# Patient Record
Sex: MALE | Race: WHITE | NOT HISPANIC OR LATINO | Employment: OTHER | URBAN - METROPOLITAN AREA
[De-identification: names, ages, dates, MRNs, and addresses within clinical notes are randomized per-mention and may not be internally consistent; named-entity substitution may affect disease eponyms.]

---

## 2019-01-01 ENCOUNTER — HOSPITAL ENCOUNTER (INPATIENT)
Facility: HOSPITAL | Age: 54
LOS: 4 days | Discharge: HOME | End: 2019-12-22
Attending: HOSPITALIST | Admitting: HOSPITALIST

## 2019-01-01 ENCOUNTER — APPOINTMENT (OUTPATIENT)
Dept: MRI IMAGING | Facility: HOSPITAL | Age: 54
End: 2019-01-01

## 2019-01-01 ENCOUNTER — APPOINTMENT (OUTPATIENT)
Dept: CT IMAGING | Facility: HOSPITAL | Age: 54
End: 2019-01-01

## 2019-01-01 ENCOUNTER — HOSPITAL ENCOUNTER (EMERGENCY)
Facility: HOSPITAL | Age: 54
Discharge: HOME | End: 2019-12-18
Attending: EMERGENCY MEDICINE

## 2019-01-01 ENCOUNTER — OFFICE VISIT (OUTPATIENT)
Dept: ONCOLOGY | Facility: CLINIC | Age: 54
End: 2019-01-01

## 2019-01-01 ENCOUNTER — APPOINTMENT (OUTPATIENT)
Dept: ULTRASOUND IMAGING | Facility: HOSPITAL | Age: 54
End: 2019-01-01

## 2019-01-01 ENCOUNTER — READMISSION MANAGEMENT (OUTPATIENT)
Dept: CALL CENTER | Facility: HOSPITAL | Age: 54
End: 2019-01-01

## 2019-01-01 VITALS
HEIGHT: 66 IN | DIASTOLIC BLOOD PRESSURE: 95 MMHG | BODY MASS INDEX: 32.95 KG/M2 | RESPIRATION RATE: 16 BRPM | SYSTOLIC BLOOD PRESSURE: 149 MMHG | OXYGEN SATURATION: 98 % | WEIGHT: 205 LBS | HEART RATE: 79 BPM | TEMPERATURE: 98.4 F

## 2019-01-01 VITALS
TEMPERATURE: 98.4 F | HEIGHT: 66 IN | WEIGHT: 209 LBS | OXYGEN SATURATION: 95 % | BODY MASS INDEX: 33.59 KG/M2 | DIASTOLIC BLOOD PRESSURE: 101 MMHG | RESPIRATION RATE: 18 BRPM | SYSTOLIC BLOOD PRESSURE: 166 MMHG | HEART RATE: 105 BPM

## 2019-01-01 VITALS
HEIGHT: 66 IN | OXYGEN SATURATION: 99 % | RESPIRATION RATE: 18 BRPM | TEMPERATURE: 97.8 F | BODY MASS INDEX: 32.95 KG/M2 | HEART RATE: 108 BPM | WEIGHT: 205.03 LBS | SYSTOLIC BLOOD PRESSURE: 143 MMHG | DIASTOLIC BLOOD PRESSURE: 95 MMHG

## 2019-01-01 DIAGNOSIS — Z78.9 IMPAIRED MOBILITY AND ADLS: Primary | ICD-10-CM

## 2019-01-01 DIAGNOSIS — M54.41 ACUTE MIDLINE LOW BACK PAIN WITH RIGHT-SIDED SCIATICA: ICD-10-CM

## 2019-01-01 DIAGNOSIS — M54.16 LUMBAR RADICULOPATHY: ICD-10-CM

## 2019-01-01 DIAGNOSIS — C64.2 RENAL CANCER, LEFT (HCC): Primary | Chronic | ICD-10-CM

## 2019-01-01 DIAGNOSIS — G54.9 COMPRESSION OF SPINAL NERVE ROOT: ICD-10-CM

## 2019-01-01 DIAGNOSIS — D49.2 LUMBAR SPINE TUMOR: ICD-10-CM

## 2019-01-01 DIAGNOSIS — M54.9 INTRACTABLE BACK PAIN: ICD-10-CM

## 2019-01-01 DIAGNOSIS — M89.8X8 MASS OF SPINE: Primary | ICD-10-CM

## 2019-01-01 DIAGNOSIS — Z74.09 IMPAIRED MOBILITY AND ADLS: Primary | ICD-10-CM

## 2019-01-01 LAB
ALBUMIN 24H MFR UR ELPH: 56.6 %
ALBUMIN SERPL-MCNC: 3.1 G/DL (ref 2.9–4.4)
ALBUMIN SERPL-MCNC: 3.6 G/DL (ref 3.5–5.2)
ALBUMIN SERPL-MCNC: 3.7 G/DL (ref 3.5–5.2)
ALBUMIN/GLOB SERPL: 1.1 {RATIO} (ref 0.7–1.7)
ALBUMIN/GLOB SERPL: 1.4 G/DL
ALBUMIN/GLOB SERPL: 1.8 G/DL
ALP SERPL-CCNC: 47 U/L (ref 39–117)
ALP SERPL-CCNC: 47 U/L (ref 39–117)
ALPHA1 GLOB 24H MFR UR ELPH: 5.7 %
ALPHA1 GLOB FLD ELPH-MCNC: 0.2 G/DL (ref 0–0.4)
ALPHA2 GLOB 24H MFR UR ELPH: 11.3 %
ALPHA2 GLOB SERPL ELPH-MCNC: 0.9 G/DL (ref 0.4–1)
ALT SERPL W P-5'-P-CCNC: 22 U/L (ref 1–41)
ALT SERPL W P-5'-P-CCNC: 24 U/L (ref 1–41)
ANION GAP SERPL CALCULATED.3IONS-SCNC: 10 MMOL/L (ref 5–15)
ANION GAP SERPL CALCULATED.3IONS-SCNC: 11 MMOL/L (ref 5–15)
APTT PPP: 25.1 SECONDS (ref 24–37)
AST SERPL-CCNC: 18 U/L (ref 1–40)
AST SERPL-CCNC: 22 U/L (ref 1–40)
B-GLOBULIN MFR UR ELPH: 16.3 %
B-GLOBULIN SERPL ELPH-MCNC: 1 G/DL (ref 0.7–1.3)
BASOPHILS # BLD AUTO: 0.04 10*3/MM3 (ref 0–0.2)
BASOPHILS NFR BLD AUTO: 0.4 % (ref 0–1.5)
BILIRUB SERPL-MCNC: 0.5 MG/DL (ref 0.2–1.2)
BILIRUB SERPL-MCNC: 0.5 MG/DL (ref 0.2–1.2)
BUN BLD-MCNC: 16 MG/DL (ref 6–20)
BUN BLD-MCNC: 24 MG/DL (ref 6–20)
BUN/CREAT SERPL: 13.4 (ref 7–25)
BUN/CREAT SERPL: 19.8 (ref 7–25)
CALCIUM SPEC-SCNC: 8.5 MG/DL (ref 8.6–10.5)
CALCIUM SPEC-SCNC: 8.7 MG/DL (ref 8.6–10.5)
CHLORIDE SERPL-SCNC: 103 MMOL/L (ref 98–107)
CHLORIDE SERPL-SCNC: 103 MMOL/L (ref 98–107)
CO2 SERPL-SCNC: 24 MMOL/L (ref 22–29)
CO2 SERPL-SCNC: 25 MMOL/L (ref 22–29)
CREAT BLD-MCNC: 1.19 MG/DL (ref 0.76–1.27)
CREAT BLD-MCNC: 1.21 MG/DL (ref 0.76–1.27)
DEPRECATED RDW RBC AUTO: 43 FL (ref 37–54)
DEPRECATED RDW RBC AUTO: 43.9 FL (ref 37–54)
EOSINOPHIL # BLD AUTO: 0.27 10*3/MM3 (ref 0–0.4)
EOSINOPHIL NFR BLD AUTO: 2.8 % (ref 0.3–6.2)
ERYTHROCYTE [DISTWIDTH] IN BLOOD BY AUTOMATED COUNT: 13.2 % (ref 12.3–15.4)
ERYTHROCYTE [DISTWIDTH] IN BLOOD BY AUTOMATED COUNT: 13.5 % (ref 12.3–15.4)
GAMMA GLOB 24H MFR UR ELPH: 10.1 %
GAMMA GLOB SERPL ELPH-MCNC: 0.6 G/DL (ref 0.4–1.8)
GFR SERPL CREATININE-BSD FRML MDRD: 62 ML/MIN/1.73
GFR SERPL CREATININE-BSD FRML MDRD: 63 ML/MIN/1.73
GLOBULIN SER CALC-MCNC: 2.8 G/DL (ref 2.2–3.9)
GLOBULIN UR ELPH-MCNC: 2.1 GM/DL
GLOBULIN UR ELPH-MCNC: 2.5 GM/DL
GLUCOSE BLD-MCNC: 115 MG/DL (ref 65–99)
GLUCOSE BLD-MCNC: 79 MG/DL (ref 65–99)
HCT VFR BLD AUTO: 50.6 % (ref 37.5–51)
HCT VFR BLD AUTO: 53.3 % (ref 37.5–51)
HGB BLD-MCNC: 16.1 G/DL (ref 13–17.7)
HGB BLD-MCNC: 17.1 G/DL (ref 13–17.7)
IGA SERPL-MCNC: 272 MG/DL (ref 90–386)
IGG SERPL-MCNC: 713 MG/DL (ref 700–1600)
IGM SERPL-MCNC: 45 MG/DL (ref 20–172)
IMM GRANULOCYTES # BLD AUTO: 0.05 10*3/MM3 (ref 0–0.05)
IMM GRANULOCYTES NFR BLD AUTO: 0.5 % (ref 0–0.5)
INR PPP: 1.03 (ref 0.85–1.16)
LYMPHOCYTES # BLD AUTO: 1.18 10*3/MM3 (ref 0.7–3.1)
LYMPHOCYTES NFR BLD AUTO: 12.4 % (ref 19.6–45.3)
Lab: ABNORMAL
Lab: ABNORMAL
M PROTEIN MFR UR ELPH: ABNORMAL %
M-SPIKE: ABNORMAL G/DL
MCH RBC QN AUTO: 28.3 PG (ref 26.6–33)
MCH RBC QN AUTO: 28.5 PG (ref 26.6–33)
MCHC RBC AUTO-ENTMCNC: 31.8 G/DL (ref 31.5–35.7)
MCHC RBC AUTO-ENTMCNC: 32.1 G/DL (ref 31.5–35.7)
MCV RBC AUTO: 88.8 FL (ref 79–97)
MCV RBC AUTO: 88.9 FL (ref 79–97)
MONOCYTES # BLD AUTO: 0.69 10*3/MM3 (ref 0.1–0.9)
MONOCYTES NFR BLD AUTO: 7.3 % (ref 5–12)
NEUTROPHILS # BLD AUTO: 7.28 10*3/MM3 (ref 1.7–7)
NEUTROPHILS NFR BLD AUTO: 76.6 % (ref 42.7–76)
NRBC BLD AUTO-RTO: 0 /100 WBC (ref 0–0.2)
PLATELET # BLD AUTO: 222 10*3/MM3 (ref 140–450)
PLATELET # BLD AUTO: 227 10*3/MM3 (ref 140–450)
PMV BLD AUTO: 10 FL (ref 6–12)
PMV BLD AUTO: 9.7 FL (ref 6–12)
POTASSIUM BLD-SCNC: 4.5 MMOL/L (ref 3.5–5.2)
POTASSIUM BLD-SCNC: 4.6 MMOL/L (ref 3.5–5.2)
PROT 24H UR-MRATE: 472 MG/24 HR (ref 30–150)
PROT PATTERN SERPL ELPH-IMP: ABNORMAL
PROT PATTERN SERPL IFE-IMP: NORMAL
PROT SERPL-MCNC: 5.8 G/DL (ref 6–8.5)
PROT SERPL-MCNC: 5.9 G/DL (ref 6–8.5)
PROT SERPL-MCNC: 6.1 G/DL (ref 6–8.5)
PROT UR-MCNC: 14.9 MG/DL
PROTHROMBIN TIME: 13 SECONDS (ref 11.2–14.3)
PSA SERPL-MCNC: 2.69 NG/ML (ref 0–4)
RBC # BLD AUTO: 5.69 10*6/MM3 (ref 4.14–5.8)
RBC # BLD AUTO: 6 10*6/MM3 (ref 4.14–5.8)
SODIUM BLD-SCNC: 138 MMOL/L (ref 136–145)
SODIUM BLD-SCNC: 138 MMOL/L (ref 136–145)
WBC NRBC COR # BLD: 15.38 10*3/MM3 (ref 3.4–10.8)
WBC NRBC COR # BLD: 9.51 10*3/MM3 (ref 3.4–10.8)

## 2019-01-01 PROCEDURE — 85610 PROTHROMBIN TIME: CPT | Performed by: HOSPITALIST

## 2019-01-01 PROCEDURE — 72157 MRI CHEST SPINE W/O & W/DYE: CPT

## 2019-01-01 PROCEDURE — 88307 TISSUE EXAM BY PATHOLOGIST: CPT | Performed by: HOSPITALIST

## 2019-01-01 PROCEDURE — 74177 CT ABD & PELVIS W/CONTRAST: CPT

## 2019-01-01 PROCEDURE — 25010000002 DEXAMETHASONE PER 1 MG

## 2019-01-01 PROCEDURE — 84165 PROTEIN E-PHORESIS SERUM: CPT | Performed by: HOSPITALIST

## 2019-01-01 PROCEDURE — 25010000003 LIDOCAINE 1 % SOLUTION: Performed by: RADIOLOGY

## 2019-01-01 PROCEDURE — 97161 PT EVAL LOW COMPLEX 20 MIN: CPT

## 2019-01-01 PROCEDURE — 25010000002 HYDROMORPHONE PER 4 MG: Performed by: HOSPITALIST

## 2019-01-01 PROCEDURE — G0378 HOSPITAL OBSERVATION PER HR: HCPCS

## 2019-01-01 PROCEDURE — 25010000002 FENTANYL CITRATE (PF) 100 MCG/2ML SOLUTION: Performed by: RADIOLOGY

## 2019-01-01 PROCEDURE — 77012 CT SCAN FOR NEEDLE BIOPSY: CPT

## 2019-01-01 PROCEDURE — 99224 PR SBSQ OBSERVATION CARE/DAY 15 MINUTES: CPT | Performed by: PHYSICIAN ASSISTANT

## 2019-01-01 PROCEDURE — 99232 SBSQ HOSP IP/OBS MODERATE 35: CPT | Performed by: HOSPITALIST

## 2019-01-01 PROCEDURE — 84156 ASSAY OF PROTEIN URINE: CPT | Performed by: PHYSICIAN ASSISTANT

## 2019-01-01 PROCEDURE — 97165 OT EVAL LOW COMPLEX 30 MIN: CPT

## 2019-01-01 PROCEDURE — A9577 INJ MULTIHANCE: HCPCS | Performed by: HOSPITALIST

## 2019-01-01 PROCEDURE — 25010000002 HYDROMORPHONE 1 MG/ML SOLUTION: Performed by: HOSPITALIST

## 2019-01-01 PROCEDURE — 90686 IIV4 VACC NO PRSV 0.5 ML IM: CPT | Performed by: HOSPITALIST

## 2019-01-01 PROCEDURE — 85025 COMPLETE CBC W/AUTO DIFF WBC: CPT | Performed by: HOSPITALIST

## 2019-01-01 PROCEDURE — 86334 IMMUNOFIX E-PHORESIS SERUM: CPT | Performed by: HOSPITALIST

## 2019-01-01 PROCEDURE — 80053 COMPREHEN METABOLIC PANEL: CPT | Performed by: HOSPITALIST

## 2019-01-01 PROCEDURE — 85027 COMPLETE CBC AUTOMATED: CPT | Performed by: HOSPITALIST

## 2019-01-01 PROCEDURE — 82784 ASSAY IGA/IGD/IGG/IGM EACH: CPT | Performed by: HOSPITALIST

## 2019-01-01 PROCEDURE — 99222 1ST HOSP IP/OBS MODERATE 55: CPT | Performed by: HOSPITALIST

## 2019-01-01 PROCEDURE — 77280 THER RAD SIMULAJ FIELD SMPL: CPT | Performed by: RADIOLOGY

## 2019-01-01 PROCEDURE — 25010000002 INFLUENZA VAC SPLIT QUAD 0.5 ML SUSPENSION PREFILLED SYRINGE: Performed by: HOSPITALIST

## 2019-01-01 PROCEDURE — 25010000002 DEXAMETHASONE PER 1 MG: Performed by: PHYSICIAN ASSISTANT

## 2019-01-01 PROCEDURE — 72149 MRI LUMBAR SPINE W/DYE: CPT

## 2019-01-01 PROCEDURE — G0103 PSA SCREENING: HCPCS | Performed by: HOSPITALIST

## 2019-01-01 PROCEDURE — 99239 HOSP IP/OBS DSCHRG MGMT >30: CPT | Performed by: HOSPITALIST

## 2019-01-01 PROCEDURE — 84155 ASSAY OF PROTEIN SERUM: CPT | Performed by: HOSPITALIST

## 2019-01-01 PROCEDURE — 63710000001 DEXAMETHASONE PER 0.25 MG

## 2019-01-01 PROCEDURE — G0008 ADMIN INFLUENZA VIRUS VAC: HCPCS | Performed by: HOSPITALIST

## 2019-01-01 PROCEDURE — 0 GADOBENATE DIMEGLUMINE 529 MG/ML SOLUTION: Performed by: HOSPITALIST

## 2019-01-01 PROCEDURE — 71250 CT THORAX DX C-: CPT

## 2019-01-01 PROCEDURE — 88341 IMHCHEM/IMCYTCHM EA ADD ANTB: CPT | Performed by: HOSPITALIST

## 2019-01-01 PROCEDURE — 25010000002 MIDAZOLAM PER 1 MG: Performed by: RADIOLOGY

## 2019-01-01 PROCEDURE — 72148 MRI LUMBAR SPINE W/O DYE: CPT

## 2019-01-01 PROCEDURE — 85730 THROMBOPLASTIN TIME PARTIAL: CPT | Performed by: RADIOLOGY

## 2019-01-01 PROCEDURE — 99205 OFFICE O/P NEW HI 60 MIN: CPT | Performed by: INTERNAL MEDICINE

## 2019-01-01 PROCEDURE — 25010000002 KETOROLAC TROMETHAMINE PER 15 MG: Performed by: EMERGENCY MEDICINE

## 2019-01-01 PROCEDURE — 99255 IP/OBS CONSLTJ NEW/EST HI 80: CPT | Performed by: PHYSICIAN ASSISTANT

## 2019-01-01 PROCEDURE — 84166 PROTEIN E-PHORESIS/URINE/CSF: CPT | Performed by: PHYSICIAN ASSISTANT

## 2019-01-01 PROCEDURE — 74176 CT ABD & PELVIS W/O CONTRAST: CPT

## 2019-01-01 PROCEDURE — 76705 ECHO EXAM OF ABDOMEN: CPT

## 2019-01-01 PROCEDURE — 99232 SBSQ HOSP IP/OBS MODERATE 35: CPT | Performed by: NEUROLOGICAL SURGERY

## 2019-01-01 PROCEDURE — 0FB13ZX EXCISION OF RIGHT LOBE LIVER, PERCUTANEOUS APPROACH, DIAGNOSTIC: ICD-10-PCS | Performed by: RADIOLOGY

## 2019-01-01 PROCEDURE — 0 IOPAMIDOL PER 1 ML: Performed by: HOSPITALIST

## 2019-01-01 PROCEDURE — 88342 IMHCHEM/IMCYTCHM 1ST ANTB: CPT | Performed by: HOSPITALIST

## 2019-01-01 PROCEDURE — 99233 SBSQ HOSP IP/OBS HIGH 50: CPT | Performed by: HOSPITALIST

## 2019-01-01 RX ORDER — PANTOPRAZOLE SODIUM 40 MG/1
40 TABLET, DELAYED RELEASE ORAL DAILY
Qty: 30 TABLET | Refills: 0 | Status: SHIPPED | OUTPATIENT
Start: 2019-01-01

## 2019-01-01 RX ORDER — ONDANSETRON 2 MG/ML
4 INJECTION INTRAMUSCULAR; INTRAVENOUS EVERY 6 HOURS PRN
Status: DISCONTINUED | OUTPATIENT
Start: 2019-01-01 | End: 2019-01-01 | Stop reason: HOSPADM

## 2019-01-01 RX ORDER — MIDAZOLAM HYDROCHLORIDE 1 MG/ML
2 INJECTION INTRAMUSCULAR; INTRAVENOUS
Status: COMPLETED | OUTPATIENT
Start: 2019-01-01 | End: 2019-01-01

## 2019-01-01 RX ORDER — PREGABALIN 75 MG/1
75 CAPSULE ORAL EVERY 12 HOURS SCHEDULED
Status: DISCONTINUED | OUTPATIENT
Start: 2019-01-01 | End: 2019-01-01 | Stop reason: HOSPADM

## 2019-01-01 RX ORDER — LABETALOL HYDROCHLORIDE 5 MG/ML
10 INJECTION, SOLUTION INTRAVENOUS EVERY 4 HOURS PRN
Status: DISCONTINUED | OUTPATIENT
Start: 2019-01-01 | End: 2019-01-01 | Stop reason: HOSPADM

## 2019-01-01 RX ORDER — OXYCODONE HYDROCHLORIDE AND ACETAMINOPHEN 5; 325 MG/1; MG/1
1 TABLET ORAL EVERY 4 HOURS PRN
Status: DISCONTINUED | OUTPATIENT
Start: 2019-01-01 | End: 2019-01-01

## 2019-01-01 RX ORDER — NALOXONE HCL 0.4 MG/ML
0.4 VIAL (ML) INJECTION
Status: DISCONTINUED | OUTPATIENT
Start: 2019-01-01 | End: 2019-01-01 | Stop reason: HOSPADM

## 2019-01-01 RX ORDER — HYDROMORPHONE HYDROCHLORIDE 1 MG/ML
0.5 INJECTION, SOLUTION INTRAMUSCULAR; INTRAVENOUS; SUBCUTANEOUS
Status: DISCONTINUED | OUTPATIENT
Start: 2019-01-01 | End: 2019-01-01 | Stop reason: HOSPADM

## 2019-01-01 RX ORDER — OXYCODONE HYDROCHLORIDE AND ACETAMINOPHEN 5; 325 MG/1; MG/1
1 TABLET ORAL 3 TIMES DAILY
Status: DISCONTINUED | OUTPATIENT
Start: 2019-01-01 | End: 2019-01-01

## 2019-01-01 RX ORDER — CYCLOBENZAPRINE HCL 10 MG
5 TABLET ORAL EVERY 8 HOURS SCHEDULED
Status: DISCONTINUED | OUTPATIENT
Start: 2019-01-01 | End: 2019-01-01

## 2019-01-01 RX ORDER — OXYCODONE AND ACETAMINOPHEN 7.5; 325 MG/1; MG/1
1 TABLET ORAL EVERY 6 HOURS PRN
Status: DISCONTINUED | OUTPATIENT
Start: 2019-01-01 | End: 2019-01-01 | Stop reason: HOSPADM

## 2019-01-01 RX ORDER — OXYCODONE AND ACETAMINOPHEN 7.5; 325 MG/1; MG/1
1 TABLET ORAL 4 TIMES DAILY
Status: DISCONTINUED | OUTPATIENT
Start: 2019-01-01 | End: 2019-01-01 | Stop reason: HOSPADM

## 2019-01-01 RX ORDER — CYCLOBENZAPRINE HCL 10 MG
10 TABLET ORAL EVERY 8 HOURS SCHEDULED
Status: DISCONTINUED | OUTPATIENT
Start: 2019-01-01 | End: 2019-01-01 | Stop reason: HOSPADM

## 2019-01-01 RX ORDER — SODIUM CHLORIDE, SODIUM LACTATE, POTASSIUM CHLORIDE, CALCIUM CHLORIDE 600; 310; 30; 20 MG/100ML; MG/100ML; MG/100ML; MG/100ML
100 INJECTION, SOLUTION INTRAVENOUS CONTINUOUS
Status: DISCONTINUED | OUTPATIENT
Start: 2019-01-01 | End: 2019-01-01

## 2019-01-01 RX ORDER — ONDANSETRON 4 MG/1
4 TABLET, FILM COATED ORAL EVERY 6 HOURS PRN
Status: DISCONTINUED | OUTPATIENT
Start: 2019-01-01 | End: 2019-01-01 | Stop reason: HOSPADM

## 2019-01-01 RX ORDER — LIDOCAINE HYDROCHLORIDE 10 MG/ML
20 INJECTION, SOLUTION INFILTRATION; PERINEURAL ONCE
Status: COMPLETED | OUTPATIENT
Start: 2019-01-01 | End: 2019-01-01

## 2019-01-01 RX ORDER — CYCLOBENZAPRINE HCL 10 MG
10 TABLET ORAL EVERY 8 HOURS SCHEDULED
Qty: 90 TABLET | Refills: 0 | Status: SHIPPED | OUTPATIENT
Start: 2019-01-01 | End: 2020-01-01

## 2019-01-01 RX ORDER — IBUPROFEN 600 MG/1
600 TABLET ORAL EVERY 6 HOURS PRN
Start: 2019-01-01 | End: 2020-01-01

## 2019-01-01 RX ORDER — LOSARTAN POTASSIUM 50 MG/1
100 TABLET ORAL DAILY
Status: DISCONTINUED | OUTPATIENT
Start: 2019-01-01 | End: 2019-01-01

## 2019-01-01 RX ORDER — KETOROLAC TROMETHAMINE 30 MG/ML
30 INJECTION, SOLUTION INTRAMUSCULAR; INTRAVENOUS ONCE
Status: COMPLETED | OUTPATIENT
Start: 2019-01-01 | End: 2019-01-01

## 2019-01-01 RX ORDER — OXYCODONE HYDROCHLORIDE AND ACETAMINOPHEN 5; 325 MG/1; MG/1
2 TABLET ORAL EVERY 4 HOURS PRN
Status: DISCONTINUED | OUTPATIENT
Start: 2019-01-01 | End: 2019-01-01

## 2019-01-01 RX ORDER — DEXAMETHASONE 4 MG/1
4 TABLET ORAL EVERY 6 HOURS
Status: DISCONTINUED | OUTPATIENT
Start: 2019-01-01 | End: 2019-01-01 | Stop reason: HOSPADM

## 2019-01-01 RX ORDER — OXYCODONE AND ACETAMINOPHEN 7.5; 325 MG/1; MG/1
1 TABLET ORAL EVERY 4 HOURS PRN
Qty: 37 TABLET | Refills: 0 | Status: SHIPPED | OUTPATIENT
Start: 2019-01-01 | End: 2020-01-01

## 2019-01-01 RX ORDER — FENTANYL CITRATE 50 UG/ML
50 INJECTION, SOLUTION INTRAMUSCULAR; INTRAVENOUS
Status: COMPLETED | OUTPATIENT
Start: 2019-01-01 | End: 2019-01-01

## 2019-01-01 RX ORDER — HYDROCHLOROTHIAZIDE 25 MG/1
25 TABLET ORAL DAILY
Qty: 30 TABLET | Refills: 0 | Status: SHIPPED | OUTPATIENT
Start: 2019-01-01 | End: 2020-01-01

## 2019-01-01 RX ORDER — HYDROCHLOROTHIAZIDE 25 MG/1
25 TABLET ORAL DAILY
Status: DISCONTINUED | OUTPATIENT
Start: 2019-01-01 | End: 2019-01-01 | Stop reason: HOSPADM

## 2019-01-01 RX ORDER — AMOXICILLIN 250 MG
2 CAPSULE ORAL NIGHTLY
Status: DISCONTINUED | OUTPATIENT
Start: 2019-01-01 | End: 2019-01-01 | Stop reason: HOSPADM

## 2019-01-01 RX ORDER — AMOXICILLIN 250 MG
2 CAPSULE ORAL NIGHTLY
Qty: 60 TABLET | Refills: 0 | Status: SHIPPED | OUTPATIENT
Start: 2019-01-01 | End: 2020-01-01

## 2019-01-01 RX ORDER — GABAPENTIN 100 MG/1
100 CAPSULE ORAL 3 TIMES DAILY
Qty: 90 CAPSULE | Refills: 3 | Status: SHIPPED | OUTPATIENT
Start: 2019-01-01 | End: 2020-01-01 | Stop reason: SDUPTHER

## 2019-01-01 RX ORDER — DEXAMETHASONE SODIUM PHOSPHATE 4 MG/ML
4 INJECTION, SOLUTION INTRA-ARTICULAR; INTRALESIONAL; INTRAMUSCULAR; INTRAVENOUS; SOFT TISSUE EVERY 6 HOURS
Status: DISCONTINUED | OUTPATIENT
Start: 2019-01-01 | End: 2019-01-01 | Stop reason: HOSPADM

## 2019-01-01 RX ORDER — DEXAMETHASONE IN 0.9 % SOD CHL 10 MG/50ML
10 INTRAVENOUS SOLUTION, PIGGYBACK (ML) INTRAVENOUS ONCE
Status: COMPLETED | OUTPATIENT
Start: 2019-01-01 | End: 2019-01-01

## 2019-01-01 RX ORDER — IBUPROFEN 600 MG/1
600 TABLET ORAL EVERY 6 HOURS PRN
Status: ON HOLD | COMMUNITY
End: 2019-01-01 | Stop reason: SDUPTHER

## 2019-01-01 RX ORDER — DEXAMETHASONE 4 MG/1
TABLET ORAL
Qty: 40 TABLET | Refills: 0 | Status: SHIPPED | OUTPATIENT
Start: 2019-01-01 | End: 2020-01-01

## 2019-01-01 RX ORDER — LOSARTAN POTASSIUM 50 MG/1
50 TABLET ORAL DAILY
COMMUNITY
End: 2020-01-01

## 2019-01-01 RX ORDER — DEXAMETHASONE SODIUM PHOSPHATE 4 MG/ML
4 INJECTION, SOLUTION INTRA-ARTICULAR; INTRALESIONAL; INTRAMUSCULAR; INTRAVENOUS; SOFT TISSUE EVERY 6 HOURS
Status: DISCONTINUED | OUTPATIENT
Start: 2019-01-01 | End: 2019-01-01

## 2019-01-01 RX ORDER — PANTOPRAZOLE SODIUM 40 MG/1
40 TABLET, DELAYED RELEASE ORAL
Status: DISCONTINUED | OUTPATIENT
Start: 2019-01-01 | End: 2019-01-01 | Stop reason: HOSPADM

## 2019-01-01 RX ORDER — PREGABALIN 75 MG/1
75 CAPSULE ORAL EVERY 12 HOURS SCHEDULED
Qty: 60 CAPSULE | Refills: 0 | Status: SHIPPED | OUTPATIENT
Start: 2019-01-01 | End: 2020-01-01

## 2019-01-01 RX ORDER — SODIUM CHLORIDE 0.9 % (FLUSH) 0.9 %
10 SYRINGE (ML) INJECTION AS NEEDED
Status: DISCONTINUED | OUTPATIENT
Start: 2019-01-01 | End: 2019-01-01 | Stop reason: HOSPADM

## 2019-01-01 RX ORDER — LOSARTAN POTASSIUM 50 MG/1
100 TABLET ORAL ONCE
Status: COMPLETED | OUTPATIENT
Start: 2019-01-01 | End: 2019-01-01

## 2019-01-01 RX ORDER — LOSARTAN POTASSIUM 50 MG/1
100 TABLET ORAL
Status: DISCONTINUED | OUTPATIENT
Start: 2019-01-01 | End: 2019-01-01 | Stop reason: HOSPADM

## 2019-01-01 RX ORDER — SODIUM CHLORIDE 0.9 % (FLUSH) 0.9 %
10 SYRINGE (ML) INJECTION EVERY 12 HOURS SCHEDULED
Status: DISCONTINUED | OUTPATIENT
Start: 2019-01-01 | End: 2019-01-01 | Stop reason: HOSPADM

## 2019-01-01 RX ORDER — SODIUM CHLORIDE 9 MG/ML
100 INJECTION, SOLUTION INTRAVENOUS CONTINUOUS
Status: DISCONTINUED | OUTPATIENT
Start: 2019-01-01 | End: 2019-01-01

## 2019-01-01 RX ADMIN — OXYCODONE AND ACETAMINOPHEN 2 TABLET: 5; 325 TABLET ORAL at 12:14

## 2019-01-01 RX ADMIN — DEXAMETHASONE 4 MG: 4 TABLET ORAL at 04:34

## 2019-01-01 RX ADMIN — OXYCODONE HYDROCHLORIDE AND ACETAMINOPHEN 1 TABLET: 7.5; 325 TABLET ORAL at 12:09

## 2019-01-01 RX ADMIN — DEXAMETHASONE 4 MG: 4 TABLET ORAL at 21:26

## 2019-01-01 RX ADMIN — SODIUM CHLORIDE, PRESERVATIVE FREE 10 ML: 5 INJECTION INTRAVENOUS at 08:35

## 2019-01-01 RX ADMIN — OXYCODONE HYDROCHLORIDE AND ACETAMINOPHEN 1 TABLET: 7.5; 325 TABLET ORAL at 18:04

## 2019-01-01 RX ADMIN — GADOBENATE DIMEGLUMINE 19 ML: 529 INJECTION, SOLUTION INTRAVENOUS at 16:00

## 2019-01-01 RX ADMIN — DEXAMETHASONE SODIUM PHOSPHATE 4 MG: 4 INJECTION, SOLUTION INTRAMUSCULAR; INTRAVENOUS at 10:10

## 2019-01-01 RX ADMIN — CYCLOBENZAPRINE HYDROCHLORIDE 10 MG: 10 TABLET, FILM COATED ORAL at 05:33

## 2019-01-01 RX ADMIN — GUAIFENESIN 200 MG: 200 SOLUTION ORAL at 10:23

## 2019-01-01 RX ADMIN — GADOBENATE DIMEGLUMINE 19 ML: 529 INJECTION, SOLUTION INTRAVENOUS at 23:45

## 2019-01-01 RX ADMIN — HYDROMORPHONE HYDROCHLORIDE 0.5 MG: 1 INJECTION, SOLUTION INTRAMUSCULAR; INTRAVENOUS; SUBCUTANEOUS at 13:00

## 2019-01-01 RX ADMIN — PREGABALIN 75 MG: 75 CAPSULE ORAL at 08:05

## 2019-01-01 RX ADMIN — KETOROLAC TROMETHAMINE 30 MG: 30 INJECTION, SOLUTION INTRAMUSCULAR; INTRAVENOUS at 01:07

## 2019-01-01 RX ADMIN — CYCLOBENZAPRINE HYDROCHLORIDE 10 MG: 10 TABLET, FILM COATED ORAL at 21:26

## 2019-01-01 RX ADMIN — PANTOPRAZOLE SODIUM 40 MG: 40 TABLET, DELAYED RELEASE ORAL at 08:22

## 2019-01-01 RX ADMIN — CYCLOBENZAPRINE HYDROCHLORIDE 10 MG: 10 TABLET, FILM COATED ORAL at 15:33

## 2019-01-01 RX ADMIN — OXYCODONE AND ACETAMINOPHEN 1 TABLET: 5; 325 TABLET ORAL at 18:56

## 2019-01-01 RX ADMIN — DEXAMETHASONE SODIUM PHOSPHATE 4 MG: 4 INJECTION, SOLUTION INTRAMUSCULAR; INTRAVENOUS at 10:23

## 2019-01-01 RX ADMIN — SODIUM CHLORIDE, PRESERVATIVE FREE 10 ML: 5 INJECTION INTRAVENOUS at 20:16

## 2019-01-01 RX ADMIN — HYDROCHLOROTHIAZIDE 25 MG: 25 TABLET ORAL at 08:30

## 2019-01-01 RX ADMIN — PREGABALIN 75 MG: 75 CAPSULE ORAL at 20:16

## 2019-01-01 RX ADMIN — OXYCODONE AND ACETAMINOPHEN 2 TABLET: 5; 325 TABLET ORAL at 06:40

## 2019-01-01 RX ADMIN — HYDROCHLOROTHIAZIDE 25 MG: 25 TABLET ORAL at 08:05

## 2019-01-01 RX ADMIN — SODIUM CHLORIDE, PRESERVATIVE FREE 10 ML: 5 INJECTION INTRAVENOUS at 21:15

## 2019-01-01 RX ADMIN — OXYCODONE HYDROCHLORIDE AND ACETAMINOPHEN 1 TABLET: 5; 325 TABLET ORAL at 16:32

## 2019-01-01 RX ADMIN — DEXAMETHASONE 4 MG: 4 TABLET ORAL at 10:26

## 2019-01-01 RX ADMIN — DEXAMETHASONE SODIUM PHOSPHATE 4 MG: 4 INJECTION, SOLUTION INTRAMUSCULAR; INTRAVENOUS at 16:14

## 2019-01-01 RX ADMIN — CYCLOBENZAPRINE HYDROCHLORIDE 10 MG: 10 TABLET, FILM COATED ORAL at 21:15

## 2019-01-01 RX ADMIN — FENTANYL CITRATE 50 MCG: 50 INJECTION, SOLUTION INTRAMUSCULAR; INTRAVENOUS at 09:03

## 2019-01-01 RX ADMIN — OXYCODONE HYDROCHLORIDE AND ACETAMINOPHEN 1 TABLET: 7.5; 325 TABLET ORAL at 11:52

## 2019-01-01 RX ADMIN — OXYCODONE HYDROCHLORIDE AND ACETAMINOPHEN 1 TABLET: 5; 325 TABLET ORAL at 19:59

## 2019-01-01 RX ADMIN — HYDROMORPHONE HYDROCHLORIDE 0.5 MG: 1 INJECTION, SOLUTION INTRAMUSCULAR; INTRAVENOUS; SUBCUTANEOUS at 10:47

## 2019-01-01 RX ADMIN — SODIUM CHLORIDE, PRESERVATIVE FREE 10 ML: 5 INJECTION INTRAVENOUS at 19:59

## 2019-01-01 RX ADMIN — OXYCODONE AND ACETAMINOPHEN 1 TABLET: 5; 325 TABLET ORAL at 12:45

## 2019-01-01 RX ADMIN — HYDROMORPHONE HYDROCHLORIDE 0.5 MG: 1 INJECTION, SOLUTION INTRAMUSCULAR; INTRAVENOUS; SUBCUTANEOUS at 08:35

## 2019-01-01 RX ADMIN — SENNOSIDES AND DOCUSATE SODIUM 2 TABLET: 8.6; 5 TABLET ORAL at 20:16

## 2019-01-01 RX ADMIN — LOSARTAN POTASSIUM 100 MG: 50 TABLET, FILM COATED ORAL at 04:35

## 2019-01-01 RX ADMIN — DEXAMETHASONE 4 MG: 4 TABLET ORAL at 05:40

## 2019-01-01 RX ADMIN — OXYCODONE HYDROCHLORIDE AND ACETAMINOPHEN 1 TABLET: 7.5; 325 TABLET ORAL at 09:20

## 2019-01-01 RX ADMIN — PREGABALIN 75 MG: 75 CAPSULE ORAL at 08:33

## 2019-01-01 RX ADMIN — SODIUM CHLORIDE, POTASSIUM CHLORIDE, SODIUM LACTATE AND CALCIUM CHLORIDE 100 ML/HR: 600; 310; 30; 20 INJECTION, SOLUTION INTRAVENOUS at 00:07

## 2019-01-01 RX ADMIN — PREGABALIN 75 MG: 75 CAPSULE ORAL at 09:12

## 2019-01-01 RX ADMIN — HYDROMORPHONE HYDROCHLORIDE 0.5 MG: 1 INJECTION, SOLUTION INTRAMUSCULAR; INTRAVENOUS; SUBCUTANEOUS at 15:55

## 2019-01-01 RX ADMIN — CYCLOBENZAPRINE HYDROCHLORIDE 10 MG: 10 TABLET, FILM COATED ORAL at 14:44

## 2019-01-01 RX ADMIN — CYCLOBENZAPRINE HYDROCHLORIDE 10 MG: 10 TABLET, FILM COATED ORAL at 21:13

## 2019-01-01 RX ADMIN — SODIUM CHLORIDE, POTASSIUM CHLORIDE, SODIUM LACTATE AND CALCIUM CHLORIDE 100 ML/HR: 600; 310; 30; 20 INJECTION, SOLUTION INTRAVENOUS at 10:49

## 2019-01-01 RX ADMIN — CYCLOBENZAPRINE HYDROCHLORIDE 10 MG: 10 TABLET, FILM COATED ORAL at 05:39

## 2019-01-01 RX ADMIN — MIDAZOLAM 2 MG: 1 INJECTION INTRAMUSCULAR; INTRAVENOUS at 09:05

## 2019-01-01 RX ADMIN — HYDROMORPHONE HYDROCHLORIDE 0.5 MG: 1 INJECTION, SOLUTION INTRAMUSCULAR; INTRAVENOUS; SUBCUTANEOUS at 21:25

## 2019-01-01 RX ADMIN — PREGABALIN 75 MG: 75 CAPSULE ORAL at 21:15

## 2019-01-01 RX ADMIN — INFLUENZA VIRUS VACCINE 0.5 ML: 15; 15; 15; 15 SUSPENSION INTRAMUSCULAR at 13:34

## 2019-01-01 RX ADMIN — PANTOPRAZOLE SODIUM 40 MG: 40 TABLET, DELAYED RELEASE ORAL at 05:33

## 2019-01-01 RX ADMIN — CYCLOBENZAPRINE HYDROCHLORIDE 10 MG: 10 TABLET, FILM COATED ORAL at 21:55

## 2019-01-01 RX ADMIN — OXYCODONE AND ACETAMINOPHEN 2 TABLET: 5; 325 TABLET ORAL at 02:32

## 2019-01-01 RX ADMIN — HYDROMORPHONE HYDROCHLORIDE 0.5 MG: 1 INJECTION, SOLUTION INTRAMUSCULAR; INTRAVENOUS; SUBCUTANEOUS at 19:00

## 2019-01-01 RX ADMIN — DEXAMETHASONE SODIUM PHOSPHATE 4 MG: 4 INJECTION, SOLUTION INTRAMUSCULAR; INTRAVENOUS at 09:19

## 2019-01-01 RX ADMIN — OXYCODONE HYDROCHLORIDE AND ACETAMINOPHEN 1 TABLET: 7.5; 325 TABLET ORAL at 08:05

## 2019-01-01 RX ADMIN — OXYCODONE HYDROCHLORIDE AND ACETAMINOPHEN 1 TABLET: 5; 325 TABLET ORAL at 08:30

## 2019-01-01 RX ADMIN — DEXAMETHASONE 4 MG: 4 TABLET ORAL at 21:15

## 2019-01-01 RX ADMIN — LOSARTAN POTASSIUM 100 MG: 50 TABLET, FILM COATED ORAL at 08:22

## 2019-01-01 RX ADMIN — HYDROMORPHONE HYDROCHLORIDE 0.5 MG: 1 INJECTION, SOLUTION INTRAMUSCULAR; INTRAVENOUS; SUBCUTANEOUS at 14:20

## 2019-01-01 RX ADMIN — DEXAMETHASONE 4 MG: 4 TABLET ORAL at 15:34

## 2019-01-01 RX ADMIN — DEXAMETHASONE 4 MG: 4 TABLET ORAL at 21:13

## 2019-01-01 RX ADMIN — SODIUM CHLORIDE 100 ML/HR: 9 INJECTION, SOLUTION INTRAVENOUS at 15:37

## 2019-01-01 RX ADMIN — SODIUM CHLORIDE, PRESERVATIVE FREE 10 ML: 5 INJECTION INTRAVENOUS at 09:25

## 2019-01-01 RX ADMIN — HYDROMORPHONE HYDROCHLORIDE 0.5 MG: 1 INJECTION, SOLUTION INTRAMUSCULAR; INTRAVENOUS; SUBCUTANEOUS at 16:55

## 2019-01-01 RX ADMIN — GUAIFENESIN 200 MG: 200 SOLUTION ORAL at 15:17

## 2019-01-01 RX ADMIN — LOSARTAN POTASSIUM 100 MG: 50 TABLET, FILM COATED ORAL at 05:39

## 2019-01-01 RX ADMIN — LIDOCAINE HYDROCHLORIDE 20 ML: 10 INJECTION, SOLUTION INFILTRATION; PERINEURAL at 09:10

## 2019-01-01 RX ADMIN — DEXAMETHASONE SODIUM PHOSPHATE 4 MG: 4 INJECTION, SOLUTION INTRAMUSCULAR; INTRAVENOUS at 21:55

## 2019-01-01 RX ADMIN — PANTOPRAZOLE SODIUM 40 MG: 40 TABLET, DELAYED RELEASE ORAL at 05:39

## 2019-01-01 RX ADMIN — DEXAMETHASONE SODIUM PHOSPHATE 4 MG: 4 INJECTION, SOLUTION INTRAMUSCULAR; INTRAVENOUS at 05:07

## 2019-01-01 RX ADMIN — SODIUM CHLORIDE, PRESERVATIVE FREE 10 ML: 5 INJECTION INTRAVENOUS at 08:05

## 2019-01-01 RX ADMIN — CYCLOBENZAPRINE HYDROCHLORIDE 10 MG: 10 TABLET, FILM COATED ORAL at 05:07

## 2019-01-01 RX ADMIN — CYCLOBENZAPRINE HYDROCHLORIDE 10 MG: 10 TABLET, FILM COATED ORAL at 14:20

## 2019-01-01 RX ADMIN — CYCLOBENZAPRINE HYDROCHLORIDE 10 MG: 10 TABLET, FILM COATED ORAL at 04:34

## 2019-01-01 RX ADMIN — HYDROMORPHONE HYDROCHLORIDE 0.5 MG: 1 INJECTION, SOLUTION INTRAMUSCULAR; INTRAVENOUS; SUBCUTANEOUS at 12:14

## 2019-01-01 RX ADMIN — PANTOPRAZOLE SODIUM 40 MG: 40 TABLET, DELAYED RELEASE ORAL at 04:34

## 2019-01-01 RX ADMIN — DEXAMETHASONE SODIUM PHOSPHATE 10 MG: 10 INJECTION INTRAMUSCULAR; INTRAVENOUS at 15:18

## 2019-01-01 RX ADMIN — LOSARTAN POTASSIUM 100 MG: 50 TABLET, FILM COATED ORAL at 08:37

## 2019-01-01 RX ADMIN — DEXAMETHASONE 4 MG: 4 TABLET ORAL at 03:09

## 2019-01-01 RX ADMIN — HYDROCHLOROTHIAZIDE 25 MG: 25 TABLET ORAL at 09:12

## 2019-01-01 RX ADMIN — HYDROMORPHONE HYDROCHLORIDE 0.5 MG: 1 INJECTION, SOLUTION INTRAMUSCULAR; INTRAVENOUS; SUBCUTANEOUS at 14:21

## 2019-01-01 RX ADMIN — PREGABALIN 75 MG: 75 CAPSULE ORAL at 19:59

## 2019-01-01 RX ADMIN — IOPAMIDOL 95 ML: 755 INJECTION, SOLUTION INTRAVENOUS at 15:30

## 2019-01-01 RX ADMIN — OXYCODONE AND ACETAMINOPHEN 1 TABLET: 5; 325 TABLET ORAL at 16:21

## 2019-01-01 RX ADMIN — DEXAMETHASONE 4 MG: 4 TABLET ORAL at 16:31

## 2019-01-01 RX ADMIN — OXYCODONE HYDROCHLORIDE AND ACETAMINOPHEN 1 TABLET: 7.5; 325 TABLET ORAL at 20:16

## 2019-01-01 RX ADMIN — SODIUM CHLORIDE, PRESERVATIVE FREE 10 ML: 5 INJECTION INTRAVENOUS at 21:56

## 2019-12-18 PROBLEM — M54.10 RADICULOPATHY: Status: ACTIVE | Noted: 2019-01-01

## 2019-12-18 PROBLEM — D49.2 LUMBAR SPINE TUMOR: Status: ACTIVE | Noted: 2019-01-01

## 2019-12-18 PROBLEM — M54.9 INTRACTABLE BACK PAIN: Status: ACTIVE | Noted: 2019-01-01

## 2019-12-18 PROBLEM — I10 HYPERTENSION: Status: ACTIVE | Noted: 2019-01-01

## 2019-12-22 PROBLEM — R00.0 SINUS TACHYCARDIA: Status: ACTIVE | Noted: 2019-01-01

## 2019-12-22 PROBLEM — C79.9 METASTATIC CANCER (HCC): Status: ACTIVE | Noted: 2019-01-01

## 2019-12-23 NOTE — OUTREACH NOTE
Prep Survey      Responses   Facility patient discharged from?  [facility]   Is patient eligible?  Yes   Discharge diagnosis  New Dx of Metastatic Cancer (path pending)   Does the patient have one of the following disease processes/diagnoses(primary or secondary)?  Other   Does the patient have Home health ordered?  No   Is there a DME ordered?  No   Prep survey completed?  Yes          Lorri Khalil RN

## 2019-12-26 NOTE — OUTREACH NOTE
Medical Week 1 Survey      Responses   Facility patient discharged from?  [facility]   Does the patient have one of the following disease processes/diagnoses(primary or secondary)?  Other   Is there a successful TCM telephone encounter documented?  No   Week 1 attempt successful?  Yes   Call start time  1049   Call end time  1054   General alerts for this patient  Pt is staying with sister Felecia   Discharge diagnosis  New Dx of Metastatic Cancer (path pending)   Person spoke with today (if not patient) and relationship  Felecia-sister   Meds reviewed with patient/caregiver?  Yes   Is the patient having any side effects they believe may be caused by any medication additions or changes?  No   Does the patient have all medications ordered at discharge?  No   What is keeping the patient from filling the prescriptions?  Financial [Lyrica is almost $400/mo]   Nursing Interventions  Nurse provided patient education   Notified Case Management  Financial   Is the patient taking all medications as directed (includes completed medication regime)?  Yes   Does the patient have a primary care provider?   Yes   Does the patient have an appointment with their PCP within 7 days of discharge?  Yes   Comments regarding PCP  Appointment is next week   Has the patient kept scheduled appointments due by today?  N/A   Psychosocial issues?  No   Did the patient receive a copy of their discharge instructions?  Yes   Nursing interventions  Reviewed instructions with patient   What is the patient's perception of their health status since discharge?  Same [Felecia reports he's in lots of pain which causes difficulty walking.]   Is the patient/caregiver able to teach back signs and symptoms related to disease process for when to call PCP?  Yes   Is the patient/caregiver able to teach back signs and symptoms related to disease process for when to call 911?  Yes   Is the patient/caregiver able to teach back the hierarchy of who to call/visit for  symptoms/problems? PCP, Specialist, Home health nurse, Urgent Care, ED, 911  Yes   If the patient is a current smoker, are they able to teach back resources for cessation?  -- [Pt is a nonsmoker]   Week 1 call completed?  Yes          Romelia Villagomez RN

## 2019-12-30 PROBLEM — C64.2 RENAL CANCER, LEFT (HCC): Chronic | Status: ACTIVE | Noted: 2019-01-01

## 2019-12-30 PROBLEM — C64.2 RENAL CANCER, LEFT (HCC): Status: ACTIVE | Noted: 2019-01-01

## 2019-12-30 NOTE — PROGRESS NOTES
CHIEF COMPLAINT: Metastatic kidney cancer    REASON FOR REFERRAL: Same      RECORDS OBTAINED  Records of the patients history including those obtained from hospital records were reviewed and summarized in detail.    Oncology/Hematology History    1. Kidney cancer  2. Biopsy-proven liver metastases  3. Radiographic evidence of bone metastases and lung metastases  4. Probable adrenal metastases    Oncology timeline:  -12/18/2019 presented to emergency room with back pain.  Started while riding horses 12/11/2019 but began suddenly.  Went to chiropractor with no results.  Went to  emergency room on 12/14/2018 due to the pain radiating down into his lower extremities and they gave him steroids and a muscle relaxer.  12/18/2019 MRI emergency room showed L4 osseous tumor with extension into the paraspinal soft tissues as well as posterior extradural spinal canal with severe compression of the thecal sac at L2.  CTs of chest abdomen pelvis revealed mediastinal adenopathy, multiple lung nodules concerning for metastasis, and likely rib metastases.  There are abnormalities in the liver consistent with metastasis and a 6.3 cm left renal mass likely the primary as well as a left adrenal mass and involvement of the right adrenal gland.  MRI of lumbar and thoracic spine revealed multiple degenerative changes.  Saw Dr. Rizo who did not recommend surgery and they recommended CyberKnife radiation for which Dr. Madrigal evaluated 12/18/2019.  -12/20/2019 CT-guided liver mass biopsy suggestive of atypical non-clear-cell renal cell carcinoma sent to [facility] for further consultation.  Favor white count of 15,000, CBC and CMP unremarkable.  PSA normal at 2.69.  Per Dr. Pruitt this could be a collecting duct carcinoma or another rare variant of a kidney cancer.    If this is a collecting duct carcinoma, this would be a very aggressive subtype of renal cell carcinoma.  Patients with a fumarate hydratase genomic alteration might  benefit from M tor inhibitors particularly if there is in an NF2 alteration.  [id number] suggests cisplatin resistance.  CDKN2A is seen in about 62% of these.  Prospective trials are limited in number and number of patients but older data suggests benefit of cisplatin gemcitabine while newer data casts doubt on such.  There are also conflicting reports of response and lack thereof to targeted therapy such as tyrosine kinase inhibitors with sorafenib and sunitinib and Cabozantanib having variable responses between 30 to 50%.  PDL 1 expression is highly variable and immunotherapy while not universally helpful may have a role.  There is a clinical trial looking at gemcitabine cisplatin plus up Avastin (GETA UG-AF u24) as well as cabozantinib (Bonsai trial).  In the meantime, I will prepare for high risk kidney cancer standard therapy with Keytruda axitinib while waiting on the above molecular testing and data [facility].  We will get an MRI of his brain as well as he has some dizziness and we need completion staging.  I have contacted radiation oncology to have them follow-up for spine radiation as his right leg is getting more numb.  We will also get in with our palliative care clinic for pain management.  We will also get an MRI of his neck with numbness now happening in his left arm.        Renal cancer, left (CMS/HCC)    12/22/2019 Initial Diagnosis     Renal cancer, left (CMS/HCC)      12/30/2019 Cancer Staged     Staging form: Kidney, AJCC 8th Edition  - Clinical: Stage IV (cT1b, cN0, pM1) - Signed by Jose Coffey MD on 12/30/2019         HISTORY OF PRESENT ILLNESS:  The patient is a 54 y.o.  male, referred for metastatic kidney cancer.  See above for oncology history of present illness.  Numbness in the right leg and now more so on the left arm.  Considerable back pain.    REVIEW OF SYSTEMS:  A 14 point review of systems was performed and is negative except as noted above.    Past  Medical History:   Diagnosis Date   • Hypertension      Past Surgical History:   Procedure Laterality Date   • KNEE SURGERY Left     x 3   • ROTATOR CUFF REPAIR Right 2014       Current Outpatient Medications on File Prior to Visit   Medication Sig Dispense Refill   • cyclobenzaprine (FLEXERIL) 10 MG tablet Take 1 tablet by mouth Every 8 (Eight) Hours. 90 tablet 0   • dexamethasone (DECADRON) 4 MG tablet Take 1 tab 4x daily x 3 days, then 1 tab 3x daily x 3 days, then 1 tab 2x daily x 3 days, then 1 tab daily x 3 days then stop 40 tablet 0   • hydroCHLOROthiazide (HYDRODIURIL) 25 MG tablet Take 1 tablet by mouth Daily. 30 tablet 0   • ibuprofen (ADVIL,MOTRIN) 600 MG tablet Take 1 tablet by mouth Every 6 (Six) Hours As Needed for Mild Pain . Avoid while on steroids     • losartan (COZAAR) 50 MG tablet Take 100 mg by mouth Daily.     • oxyCODONE-acetaminophen (PERCOCET) 7.5-325 MG per tablet Take 1 tablet by mouth Every 4 (Four) Hours As Needed for Moderate Pain  for up to 48 doses. 37 tablet 0   • pantoprazole (PROTONIX) 40 MG EC tablet Take 1 tablet by mouth Daily. 30 tablet 0   • polyethylene glycol (MIRALAX) pack packet Take 17 g by mouth Daily As Needed (constipation).     • pregabalin (LYRICA) 75 MG capsule Take 1 capsule by mouth Every 12 (Twelve) Hours. 60 capsule 0   • sennosides-docusate (PERICOLACE) 8.6-50 MG per tablet Take 2 tablets by mouth Every Night. 60 tablet 0     No current facility-administered medications on file prior to visit.        No Known Allergies    Social History     Socioeconomic History   • Marital status:      Spouse name: Not on file   • Number of children: Not on file   • Years of education: Not on file   • Highest education level: Not on file   Tobacco Use   • Smoking status: Never Smoker   • Smokeless tobacco: Never Used   Substance and Sexual Activity   • Alcohol use: Never     Frequency: Never   • Drug use: Never   • Sexual activity: Defer   Social History Narrative     "Lives in [place]. 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        Family History   Problem Relation Age of Onset   • Cancer Father    • Heart disease Father        PHYSICAL EXAM:    BP (!) 166/101   Pulse 105   Temp 98.4 °F (36.9 °C)   Resp 18   Ht 167.6 cm (66\")   Wt 94.8 kg (209 lb)   SpO2 95%   BMI 33.73 kg/m²     ECOG: (0) Fully active, able to carry on all predisease performance without restriction  General: well appearing male in no acute distress  HEENT: sclera anicteric, oropharynx clear  Lymphatics: no cervical, supraclavicular, inguinal, or axillary adenopathy  Cardiovascular: regular rate and rhythm, no murmurs  Neck: Supple; No thyromegaly  Lungs: clear to auscultation bilaterally. No respiratory distress.   Abdomen: soft, nontender, nondistended.  No palpable organomegaly  Extremities: no cyanosis, clubbing, edema, or cords  Skin: no rashes, lesions, bruising, or petechiae  Neuro: Alert and oriented x 4; Moving all extremities.  Psych: No anxiety or depression    Lab Results   Component Value Date    HGB 17.1 12/20/2019    HCT 53.3 (H) 12/20/2019    MCV 88.8 12/20/2019     12/20/2019    WBC 15.38 (H) 12/20/2019    NEUTROABS 7.28 (H) 12/18/2019    LYMPHSABS 1.18 12/18/2019    MONOSABS 0.69 12/18/2019    EOSABS 0.27 12/18/2019    BASOSABS 0.04 12/18/2019     Lab Results   Component Value Date    GLUCOSE 115 (H) 12/20/2019    BUN 16 12/20/2019    CREATININE 1.19 12/20/2019     12/20/2019    K 4.6 12/20/2019     12/20/2019    CO2 25.0 12/20/2019    CALCIUM 8.5 (L) 12/20/2019    PROTEINTOT 6.1 12/20/2019    ALBUMIN 3.60 12/20/2019    BILITOT 0.5 12/20/2019    ALKPHOS 47 12/20/2019    AST 18 12/20/2019    ALT 22 12/20/2019           Assessment/Plan     1. Kidney cancer  2. Biopsy-proven liver metastases  3. Radiographic evidence of bone metastases and lung metastases  4. Probable adrenal metastases    Oncology timeline:  -12/18/2019 presented to emergency room " with back pain.  Started while riding horses 12/11/2019 but began suddenly.  Went to chiropractor with no results.  Went to  emergency room on 12/14/2018 due to the pain radiating down into his lower extremities and they gave him steroids and a muscle relaxer.  12/18/2019 MRI emergency room showed L4 osseous tumor with extension into the paraspinal soft tissues as well as posterior extradural spinal canal with severe compression of the thecal sac at L2.  CTs of chest abdomen pelvis revealed mediastinal adenopathy, multiple lung nodules concerning for metastasis, and likely rib metastases.  There are abnormalities in the liver consistent with metastasis and a 6.3 cm left renal mass likely the primary as well as a left adrenal mass and involvement of the right adrenal gland.  MRI of lumbar and thoracic spine revealed multiple degenerative changes.  Saw Dr. Rizo who did not recommend surgery and they recommended CyberKnife radiation for which Dr. Madrigal evaluated 12/18/2019.  -12/20/2019 CT-guided liver mass biopsy suggestive of atypical non-clear-cell renal cell carcinoma sent to [facility] for further consultation.  Favor white count of 15,000, CBC and CMP unremarkable.  PSA normal at 2.69.  Per Dr. Pruitt this could be a collecting duct carcinoma or another rare variant of a kidney cancer.    If this is a collecting duct carcinoma, this would be a very aggressive subtype of renal cell carcinoma.  Patients with a fumarate hydratase genomic alteration might benefit from M tor inhibitors particularly if there is in an NF2 alteration.  [id number] suggests cisplatin resistance.  CDKN2A is seen in about 62% of these.  Prospective trials are limited in number and number of patients but older data suggests benefit of cisplatin gemcitabine while newer data casts doubt on such.  There are also conflicting reports of response and lack thereof to targeted therapy such as tyrosine kinase inhibitors with sorafenib and  sunitinib and Cabozantanib having variable responses between 30 to 50%.  PDL 1 expression is highly variable and immunotherapy while not universally helpful may have a role.  There is a clinical trial looking at gemcitabine cisplatin plus up Avastin (GETA UG-AF u24) as well as cabozantinib (Bonsai trial).  In the meantime, I will prepare for high risk kidney cancer standard therapy with Keytruda axitinib while waiting on the above molecular testing and data [facility].  We will get an MRI of his brain as well as he has some dizziness and we need completion staging.  I have contacted radiation oncology to have them follow-up for spine radiation as his right leg is getting more numb.  We will also get in with our palliative care clinic for pain management.  We will also get an MRI of his neck with numbness now happening in his left arm.  I have contacted radiation oncology and simulations are underway for the spine radiation treatment and they will be getting in touch with him for further appointments.  Discussed with patient face-to-face for over an hour greater than 50% spent counseling regarding this plan as outlined above.    Jose Coffey MD    12/30/2019

## 2020-01-01 ENCOUNTER — APPOINTMENT (OUTPATIENT)
Dept: CT IMAGING | Facility: HOSPITAL | Age: 55
End: 2020-01-01

## 2020-01-01 ENCOUNTER — LAB (OUTPATIENT)
Dept: LAB | Facility: HOSPITAL | Age: 55
End: 2020-01-01

## 2020-01-01 ENCOUNTER — OFFICE VISIT (OUTPATIENT)
Dept: ONCOLOGY | Facility: CLINIC | Age: 55
End: 2020-01-01

## 2020-01-01 ENCOUNTER — APPOINTMENT (OUTPATIENT)
Dept: GENERAL RADIOLOGY | Facility: HOSPITAL | Age: 55
End: 2020-01-01

## 2020-01-01 ENCOUNTER — OFFICE VISIT (OUTPATIENT)
Dept: CARDIOLOGY | Facility: CLINIC | Age: 55
End: 2020-01-01

## 2020-01-01 ENCOUNTER — READMISSION MANAGEMENT (OUTPATIENT)
Dept: CALL CENTER | Facility: HOSPITAL | Age: 55
End: 2020-01-01

## 2020-01-01 ENCOUNTER — SPECIALTY PHARMACY (OUTPATIENT)
Dept: ONCOLOGY | Facility: HOSPITAL | Age: 55
End: 2020-01-01

## 2020-01-01 ENCOUNTER — HOSPITAL ENCOUNTER (OUTPATIENT)
Dept: MRI IMAGING | Facility: HOSPITAL | Age: 55
Discharge: HOME | End: 2020-01-06

## 2020-01-01 ENCOUNTER — TELEPHONE (OUTPATIENT)
Dept: ONCOLOGY | Facility: CLINIC | Age: 55
End: 2020-01-01

## 2020-01-01 ENCOUNTER — APPOINTMENT (OUTPATIENT)
Dept: NUCLEAR MEDICINE | Facility: HOSPITAL | Age: 55
End: 2020-01-01

## 2020-01-01 ENCOUNTER — HOSPITAL ENCOUNTER (OUTPATIENT)
Dept: ONCOLOGY | Facility: HOSPITAL | Age: 55
Setting detail: INFUSION SERIES
Discharge: HOME | End: 2020-02-03

## 2020-01-01 ENCOUNTER — HOSPITAL ENCOUNTER (INPATIENT)
Facility: HOSPITAL | Age: 55
LOS: 2 days | Discharge: HOME | End: 2020-03-04
Attending: EMERGENCY MEDICINE | Admitting: INTERNAL MEDICINE

## 2020-01-01 ENCOUNTER — TELEPHONE (OUTPATIENT)
Dept: NEUROSURGERY | Facility: CLINIC | Age: 55
End: 2020-01-01

## 2020-01-01 ENCOUNTER — TELEPHONE (OUTPATIENT)
Dept: NUTRITION | Facility: HOSPITAL | Age: 55
End: 2020-01-01

## 2020-01-01 ENCOUNTER — OFFICE VISIT (OUTPATIENT)
Dept: NEUROSURGERY | Facility: CLINIC | Age: 55
End: 2020-01-01

## 2020-01-01 ENCOUNTER — HOSPITAL ENCOUNTER (OUTPATIENT)
Dept: RADIATION ONCOLOGY | Facility: HOSPITAL | Age: 55
Discharge: HOME | End: 2020-01-09

## 2020-01-01 ENCOUNTER — HOSPITAL ENCOUNTER (EMERGENCY)
Facility: HOSPITAL | Age: 55
Discharge: HOME | End: 2020-02-10
Attending: EMERGENCY MEDICINE | Admitting: EMERGENCY MEDICINE

## 2020-01-01 ENCOUNTER — HOSPITAL ENCOUNTER (OUTPATIENT)
Dept: ONCOLOGY | Facility: HOSPITAL | Age: 55
Setting detail: INFUSION SERIES
Discharge: HOME | End: 2020-01-13

## 2020-01-01 ENCOUNTER — CONSULT (OUTPATIENT)
Dept: CARDIOLOGY | Facility: CLINIC | Age: 55
End: 2020-01-01

## 2020-01-01 ENCOUNTER — OFFICE VISIT (OUTPATIENT)
Dept: RADIATION ONCOLOGY | Facility: HOSPITAL | Age: 55
End: 2020-01-01

## 2020-01-01 ENCOUNTER — DOCUMENTATION (OUTPATIENT)
Dept: ONCOLOGY | Facility: CLINIC | Age: 55
End: 2020-01-01

## 2020-01-01 ENCOUNTER — EDUCATION (OUTPATIENT)
Dept: ONCOLOGY | Facility: HOSPITAL | Age: 55
End: 2020-01-01

## 2020-01-01 ENCOUNTER — HOSPITAL ENCOUNTER (OUTPATIENT)
Dept: ONCOLOGY | Facility: HOSPITAL | Age: 55
Setting detail: INFUSION SERIES
Discharge: HOME | End: 2020-02-24

## 2020-01-01 ENCOUNTER — OFFICE VISIT (OUTPATIENT)
Dept: PALLIATIVE CARE | Facility: CLINIC | Age: 55
End: 2020-01-01

## 2020-01-01 ENCOUNTER — HOSPITAL ENCOUNTER (OUTPATIENT)
Dept: CT IMAGING | Facility: HOSPITAL | Age: 55
Discharge: HOME | End: 2020-03-12
Admitting: INTERNAL MEDICINE

## 2020-01-01 ENCOUNTER — APPOINTMENT (OUTPATIENT)
Dept: INTERVENTIONAL RADIOLOGY/VASCULAR | Facility: HOSPITAL | Age: 55
End: 2020-01-01

## 2020-01-01 ENCOUNTER — HOSPITAL ENCOUNTER (OUTPATIENT)
Dept: MRI IMAGING | Facility: HOSPITAL | Age: 55
Discharge: HOME | End: 2020-02-20
Admitting: NEUROLOGICAL SURGERY

## 2020-01-01 ENCOUNTER — HOSPITAL ENCOUNTER (INPATIENT)
Facility: HOSPITAL | Age: 55
LOS: 1 days | End: 2020-05-21
Attending: INTERNAL MEDICINE | Admitting: INTERNAL MEDICINE

## 2020-01-01 ENCOUNTER — DOCUMENTATION (OUTPATIENT)
Dept: NUTRITION | Facility: HOSPITAL | Age: 55
End: 2020-01-01

## 2020-01-01 ENCOUNTER — APPOINTMENT (OUTPATIENT)
Dept: MRI IMAGING | Facility: HOSPITAL | Age: 55
End: 2020-01-01

## 2020-01-01 ENCOUNTER — TELEPHONE (OUTPATIENT)
Dept: PALLIATIVE CARE | Facility: CLINIC | Age: 55
End: 2020-01-01

## 2020-01-01 ENCOUNTER — RESEARCH ENCOUNTER (OUTPATIENT)
Dept: ONCOLOGY | Facility: CLINIC | Age: 55
End: 2020-01-01

## 2020-01-01 ENCOUNTER — HOSPITAL ENCOUNTER (OUTPATIENT)
Dept: ONCOLOGY | Facility: HOSPITAL | Age: 55
Discharge: HOME | End: 2020-01-07
Admitting: INTERNAL MEDICINE

## 2020-01-01 ENCOUNTER — HOSPITAL ENCOUNTER (OUTPATIENT)
Dept: RADIATION ONCOLOGY | Facility: HOSPITAL | Age: 55
Setting detail: RADIATION/ONCOLOGY SERIES
Discharge: HOME | End: 2020-02-06

## 2020-01-01 ENCOUNTER — DOCUMENTATION (OUTPATIENT)
Dept: PALLIATIVE CARE | Facility: CLINIC | Age: 55
End: 2020-01-01

## 2020-01-01 ENCOUNTER — HOSPITAL ENCOUNTER (OUTPATIENT)
Dept: ONCOLOGY | Facility: HOSPITAL | Age: 55
Setting detail: INFUSION SERIES
Discharge: HOME | End: 2020-03-20

## 2020-01-01 ENCOUNTER — HOSPITAL ENCOUNTER (INPATIENT)
Facility: HOSPITAL | Age: 55
LOS: 1 days | Discharge: HOSPICE | End: 2020-05-20
Attending: EMERGENCY MEDICINE | Admitting: FAMILY MEDICINE

## 2020-01-01 ENCOUNTER — HOSPITAL ENCOUNTER (OUTPATIENT)
Dept: MRI IMAGING | Facility: HOSPITAL | Age: 55
Discharge: HOME | End: 2020-01-06
Admitting: INTERNAL MEDICINE

## 2020-01-01 ENCOUNTER — HOSPITAL ENCOUNTER (INPATIENT)
Facility: HOSPITAL | Age: 55
LOS: 2 days | Discharge: HOME HEALTH | End: 2020-04-07
Attending: EMERGENCY MEDICINE | Admitting: INTERNAL MEDICINE

## 2020-01-01 ENCOUNTER — HOSPITAL ENCOUNTER (OUTPATIENT)
Dept: ONCOLOGY | Facility: HOSPITAL | Age: 55
Discharge: HOME | End: 2020-03-20

## 2020-01-01 ENCOUNTER — PREP FOR SURGERY (OUTPATIENT)
Dept: OTHER | Facility: HOSPITAL | Age: 55
End: 2020-01-01

## 2020-01-01 ENCOUNTER — HOSPITAL ENCOUNTER (OUTPATIENT)
Dept: CARDIOLOGY | Facility: HOSPITAL | Age: 55
Discharge: HOME | End: 2020-01-29
Admitting: INTERNAL MEDICINE

## 2020-01-01 ENCOUNTER — HOSPITAL ENCOUNTER (OUTPATIENT)
Dept: ONCOLOGY | Facility: HOSPITAL | Age: 55
Setting detail: INFUSION SERIES
Discharge: HOME | End: 2020-03-27

## 2020-01-01 ENCOUNTER — TELEPHONE (OUTPATIENT)
Dept: PAIN MEDICINE | Facility: CLINIC | Age: 55
End: 2020-01-01

## 2020-01-01 ENCOUNTER — DOCUMENTATION (OUTPATIENT)
Dept: NEUROSURGERY | Facility: CLINIC | Age: 55
End: 2020-01-01

## 2020-01-01 ENCOUNTER — APPOINTMENT (OUTPATIENT)
Dept: ONCOLOGY | Facility: HOSPITAL | Age: 55
End: 2020-01-01

## 2020-01-01 ENCOUNTER — HOSPITAL ENCOUNTER (OUTPATIENT)
Dept: RADIATION ONCOLOGY | Facility: HOSPITAL | Age: 55
Setting detail: RADIATION/ONCOLOGY SERIES
Discharge: HOME | End: 2020-01-02

## 2020-01-01 VITALS
RESPIRATION RATE: 18 BRPM | HEIGHT: 67 IN | BODY MASS INDEX: 31.83 KG/M2 | OXYGEN SATURATION: 96 % | TEMPERATURE: 97.5 F | WEIGHT: 202.82 LBS | SYSTOLIC BLOOD PRESSURE: 117 MMHG | DIASTOLIC BLOOD PRESSURE: 78 MMHG | HEART RATE: 126 BPM

## 2020-01-01 VITALS
OXYGEN SATURATION: 93 % | HEART RATE: 96 BPM | BODY MASS INDEX: 30.05 KG/M2 | WEIGHT: 186.2 LBS | DIASTOLIC BLOOD PRESSURE: 65 MMHG | SYSTOLIC BLOOD PRESSURE: 99 MMHG

## 2020-01-01 VITALS
DIASTOLIC BLOOD PRESSURE: 93 MMHG | SYSTOLIC BLOOD PRESSURE: 122 MMHG | HEIGHT: 66 IN | HEART RATE: 77 BPM | OXYGEN SATURATION: 96 % | WEIGHT: 200 LBS | BODY MASS INDEX: 32.14 KG/M2 | TEMPERATURE: 98.8 F | RESPIRATION RATE: 18 BRPM

## 2020-01-01 VITALS
RESPIRATION RATE: 18 BRPM | OXYGEN SATURATION: 94 % | HEART RATE: 89 BPM | HEIGHT: 66 IN | BODY MASS INDEX: 31.5 KG/M2 | TEMPERATURE: 97.6 F | DIASTOLIC BLOOD PRESSURE: 91 MMHG | SYSTOLIC BLOOD PRESSURE: 135 MMHG | WEIGHT: 196 LBS

## 2020-01-01 VITALS
SYSTOLIC BLOOD PRESSURE: 107 MMHG | WEIGHT: 197 LBS | RESPIRATION RATE: 16 BRPM | OXYGEN SATURATION: 98 % | BODY MASS INDEX: 31.66 KG/M2 | DIASTOLIC BLOOD PRESSURE: 72 MMHG | HEART RATE: 89 BPM | HEIGHT: 66 IN | TEMPERATURE: 97.4 F

## 2020-01-01 VITALS
BODY MASS INDEX: 32.41 KG/M2 | DIASTOLIC BLOOD PRESSURE: 103 MMHG | OXYGEN SATURATION: 95 % | SYSTOLIC BLOOD PRESSURE: 154 MMHG | HEART RATE: 80 BPM | WEIGHT: 200.8 LBS

## 2020-01-01 VITALS
WEIGHT: 186 LBS | HEIGHT: 66 IN | BODY MASS INDEX: 29.89 KG/M2 | SYSTOLIC BLOOD PRESSURE: 121 MMHG | OXYGEN SATURATION: 96 % | DIASTOLIC BLOOD PRESSURE: 88 MMHG | DIASTOLIC BLOOD PRESSURE: 65 MMHG | HEART RATE: 96 BPM | RESPIRATION RATE: 16 BRPM | SYSTOLIC BLOOD PRESSURE: 99 MMHG | HEART RATE: 96 BPM | TEMPERATURE: 97.6 F | TEMPERATURE: 97.8 F | OXYGEN SATURATION: 93 %

## 2020-01-01 VITALS
WEIGHT: 196 LBS | SYSTOLIC BLOOD PRESSURE: 120 MMHG | OXYGEN SATURATION: 95 % | HEIGHT: 66 IN | BODY MASS INDEX: 31.5 KG/M2 | DIASTOLIC BLOOD PRESSURE: 80 MMHG | HEART RATE: 104 BPM

## 2020-01-01 VITALS
WEIGHT: 184 LBS | HEIGHT: 66 IN | BODY MASS INDEX: 29.57 KG/M2 | RESPIRATION RATE: 20 BRPM | HEART RATE: 130 BPM | DIASTOLIC BLOOD PRESSURE: 93 MMHG | TEMPERATURE: 97.4 F | OXYGEN SATURATION: 95 % | SYSTOLIC BLOOD PRESSURE: 125 MMHG

## 2020-01-01 VITALS
HEART RATE: 95 BPM | BODY MASS INDEX: 33.41 KG/M2 | OXYGEN SATURATION: 96 % | TEMPERATURE: 98 F | RESPIRATION RATE: 18 BRPM | DIASTOLIC BLOOD PRESSURE: 103 MMHG | HEIGHT: 66 IN | SYSTOLIC BLOOD PRESSURE: 165 MMHG

## 2020-01-01 VITALS
BODY MASS INDEX: 31.66 KG/M2 | WEIGHT: 197 LBS | HEART RATE: 96 BPM | OXYGEN SATURATION: 96 % | SYSTOLIC BLOOD PRESSURE: 136 MMHG | HEIGHT: 66 IN | DIASTOLIC BLOOD PRESSURE: 90 MMHG | RESPIRATION RATE: 18 BRPM | TEMPERATURE: 97.8 F

## 2020-01-01 VITALS
RESPIRATION RATE: 16 BRPM | SYSTOLIC BLOOD PRESSURE: 110 MMHG | TEMPERATURE: 98 F | HEART RATE: 95 BPM | DIASTOLIC BLOOD PRESSURE: 64 MMHG | HEIGHT: 66 IN | WEIGHT: 185 LBS | BODY MASS INDEX: 29.73 KG/M2

## 2020-01-01 VITALS
WEIGHT: 202 LBS | RESPIRATION RATE: 16 BRPM | BODY MASS INDEX: 32.47 KG/M2 | SYSTOLIC BLOOD PRESSURE: 155 MMHG | OXYGEN SATURATION: 95 % | HEIGHT: 66 IN | TEMPERATURE: 97 F | DIASTOLIC BLOOD PRESSURE: 100 MMHG | HEART RATE: 92 BPM

## 2020-01-01 VITALS
OXYGEN SATURATION: 95 % | HEIGHT: 66 IN | BODY MASS INDEX: 32.3 KG/M2 | SYSTOLIC BLOOD PRESSURE: 134 MMHG | HEART RATE: 84 BPM | TEMPERATURE: 97.7 F | WEIGHT: 201 LBS | DIASTOLIC BLOOD PRESSURE: 88 MMHG | RESPIRATION RATE: 18 BRPM

## 2020-01-01 VITALS
HEIGHT: 66 IN | RESPIRATION RATE: 18 BRPM | SYSTOLIC BLOOD PRESSURE: 151 MMHG | TEMPERATURE: 98.5 F | WEIGHT: 207 LBS | OXYGEN SATURATION: 94 % | HEART RATE: 138 BPM | BODY MASS INDEX: 33.27 KG/M2 | DIASTOLIC BLOOD PRESSURE: 102 MMHG

## 2020-01-01 VITALS
HEIGHT: 67 IN | OXYGEN SATURATION: 88 % | DIASTOLIC BLOOD PRESSURE: 68 MMHG | TEMPERATURE: 98.6 F | RESPIRATION RATE: 22 BRPM | BODY MASS INDEX: 29.82 KG/M2 | SYSTOLIC BLOOD PRESSURE: 113 MMHG | HEART RATE: 125 BPM | WEIGHT: 190 LBS

## 2020-01-01 VITALS
BODY MASS INDEX: 31.82 KG/M2 | HEART RATE: 86 BPM | HEIGHT: 66 IN | SYSTOLIC BLOOD PRESSURE: 140 MMHG | WEIGHT: 198 LBS | DIASTOLIC BLOOD PRESSURE: 90 MMHG

## 2020-01-01 VITALS
DIASTOLIC BLOOD PRESSURE: 84 MMHG | OXYGEN SATURATION: 95 % | SYSTOLIC BLOOD PRESSURE: 128 MMHG | HEART RATE: 83 BPM | WEIGHT: 195.8 LBS | BODY MASS INDEX: 31.6 KG/M2

## 2020-01-01 VITALS
TEMPERATURE: 98.4 F | HEIGHT: 66 IN | BODY MASS INDEX: 31.18 KG/M2 | SYSTOLIC BLOOD PRESSURE: 112 MMHG | WEIGHT: 194 LBS | DIASTOLIC BLOOD PRESSURE: 78 MMHG

## 2020-01-01 VITALS — SYSTOLIC BLOOD PRESSURE: 135 MMHG | DIASTOLIC BLOOD PRESSURE: 84 MMHG

## 2020-01-01 VITALS — HEIGHT: 66 IN | WEIGHT: 197 LBS | BODY MASS INDEX: 31.66 KG/M2

## 2020-01-01 VITALS — RESPIRATION RATE: 20 BRPM | OXYGEN SATURATION: 97 %

## 2020-01-01 VITALS — DIASTOLIC BLOOD PRESSURE: 85 MMHG | SYSTOLIC BLOOD PRESSURE: 149 MMHG

## 2020-01-01 DIAGNOSIS — C64.2 RENAL CANCER, LEFT (HCC): Primary | ICD-10-CM

## 2020-01-01 DIAGNOSIS — C79.51 BONE METASTASES: Primary | ICD-10-CM

## 2020-01-01 DIAGNOSIS — R05.3 COUGH, PERSISTENT: ICD-10-CM

## 2020-01-01 DIAGNOSIS — R00.0 SINUS TACHYCARDIA: ICD-10-CM

## 2020-01-01 DIAGNOSIS — R06.09 DYSPNEA ON EXERTION: Primary | ICD-10-CM

## 2020-01-01 DIAGNOSIS — C64.9 COLLECTING DUCT CARCINOMA (HCC): ICD-10-CM

## 2020-01-01 DIAGNOSIS — I10 ESSENTIAL HYPERTENSION: Primary | ICD-10-CM

## 2020-01-01 DIAGNOSIS — C79.31 BRAIN METASTASES: Primary | ICD-10-CM

## 2020-01-01 DIAGNOSIS — C64.2 RENAL CANCER, LEFT (HCC): Chronic | ICD-10-CM

## 2020-01-01 DIAGNOSIS — Z03.818 ENCNTR FOR OBS FOR SUSP EXPSR TO OTH BIOLG AGENTS RULED OUT: ICD-10-CM

## 2020-01-01 DIAGNOSIS — Z79.899 ENCOUNTER FOR LONG-TERM CURRENT USE OF HIGH RISK MEDICATION: ICD-10-CM

## 2020-01-01 DIAGNOSIS — R68.89 FLU-LIKE SYMPTOMS: Primary | ICD-10-CM

## 2020-01-01 DIAGNOSIS — G89.3 CANCER RELATED PAIN: ICD-10-CM

## 2020-01-01 DIAGNOSIS — C64.2 RENAL CANCER, LEFT (HCC): Primary | Chronic | ICD-10-CM

## 2020-01-01 DIAGNOSIS — A41.9 SEPSIS WITHOUT ACUTE ORGAN DYSFUNCTION, DUE TO UNSPECIFIED ORGANISM (HCC): ICD-10-CM

## 2020-01-01 DIAGNOSIS — R09.02 HYPOXIA: ICD-10-CM

## 2020-01-01 DIAGNOSIS — R05.9 COUGH: ICD-10-CM

## 2020-01-01 DIAGNOSIS — C64.9 COLLECTING DUCT CARCINOMA (HCC): Primary | ICD-10-CM

## 2020-01-01 DIAGNOSIS — C79.9 METASTATIC CANCER (HCC): ICD-10-CM

## 2020-01-01 DIAGNOSIS — M89.9 LESION OF VERTEBRA: Primary | ICD-10-CM

## 2020-01-01 DIAGNOSIS — R06.02 SHORTNESS OF BREATH: ICD-10-CM

## 2020-01-01 DIAGNOSIS — R79.89 ELEVATED SERUM CREATININE: ICD-10-CM

## 2020-01-01 DIAGNOSIS — C34.90 PRIMARY MALIGNANT NEOPLASM OF LUNG METASTATIC TO OTHER SITE, UNSPECIFIED LATERALITY (HCC): ICD-10-CM

## 2020-01-01 DIAGNOSIS — E87.20 ACIDOSIS: ICD-10-CM

## 2020-01-01 DIAGNOSIS — C64.2 RENAL CANCER, LEFT (HCC): ICD-10-CM

## 2020-01-01 DIAGNOSIS — G89.3 CANCER ASSOCIATED PAIN: Primary | ICD-10-CM

## 2020-01-01 DIAGNOSIS — Z51.81 THERAPEUTIC DRUG MONITORING: ICD-10-CM

## 2020-01-01 DIAGNOSIS — R41.0 CONFUSION: Primary | ICD-10-CM

## 2020-01-01 DIAGNOSIS — R50.9 FEVER, UNSPECIFIED FEVER CAUSE: ICD-10-CM

## 2020-01-01 DIAGNOSIS — J96.01 ACUTE HYPOXEMIC RESPIRATORY FAILURE (HCC): ICD-10-CM

## 2020-01-01 DIAGNOSIS — R06.00 DYSPNEA, UNSPECIFIED TYPE: ICD-10-CM

## 2020-01-01 DIAGNOSIS — N18.9 CHRONIC RENAL IMPAIRMENT, UNSPECIFIED CKD STAGE: ICD-10-CM

## 2020-01-01 DIAGNOSIS — N28.9 RENAL INSUFFICIENCY: ICD-10-CM

## 2020-01-01 DIAGNOSIS — C64.2 RENAL CELL CARCINOMA OF LEFT KIDNEY METASTATIC TO OTHER SITE (HCC): ICD-10-CM

## 2020-01-01 DIAGNOSIS — J91.0 MALIGNANT PLEURAL EFFUSION: ICD-10-CM

## 2020-01-01 DIAGNOSIS — M54.16 LUMBAR RADICULOPATHY: ICD-10-CM

## 2020-01-01 DIAGNOSIS — D49.2 LUMBAR SPINE TUMOR: ICD-10-CM

## 2020-01-01 DIAGNOSIS — R06.09 DYSPNEA ON EXERTION: ICD-10-CM

## 2020-01-01 DIAGNOSIS — J96.02 ACUTE RESPIRATORY FAILURE WITH HYPERCAPNIA (HCC): Primary | ICD-10-CM

## 2020-01-01 DIAGNOSIS — R09.02 HYPOXIA: Primary | ICD-10-CM

## 2020-01-01 DIAGNOSIS — R06.01 ORTHOPNEA: ICD-10-CM

## 2020-01-01 DIAGNOSIS — R06.02 SHORTNESS OF BREATH: Primary | ICD-10-CM

## 2020-01-01 DIAGNOSIS — R05.3 COUGH, PERSISTENT: Primary | ICD-10-CM

## 2020-01-01 DIAGNOSIS — Z51.81 THERAPEUTIC DRUG MONITORING: Primary | ICD-10-CM

## 2020-01-01 DIAGNOSIS — I10 ESSENTIAL HYPERTENSION: ICD-10-CM

## 2020-01-01 DIAGNOSIS — C79.31 BRAIN METASTASES: ICD-10-CM

## 2020-01-01 DIAGNOSIS — J90 BILATERAL PLEURAL EFFUSION: ICD-10-CM

## 2020-01-01 LAB
ABO GROUP BLD: NORMAL
ABO GROUP BLD: NORMAL
ALBUMIN SERPL-MCNC: 3.2 G/DL (ref 3.5–5.2)
ALBUMIN SERPL-MCNC: 3.2 G/DL (ref 3.5–5.2)
ALBUMIN SERPL-MCNC: 3.3 G/DL (ref 3.5–5.2)
ALBUMIN SERPL-MCNC: 3.5 G/DL (ref 3.5–5.2)
ALBUMIN SERPL-MCNC: 3.6 G/DL (ref 3.5–5.2)
ALBUMIN SERPL-MCNC: 3.6 G/DL (ref 3.5–5.2)
ALBUMIN SERPL-MCNC: 3.7 G/DL (ref 3.5–5.2)
ALBUMIN SERPL-MCNC: 3.7 G/DL (ref 3.5–5.2)
ALBUMIN/GLOB SERPL: 1 G/DL
ALBUMIN/GLOB SERPL: 1.1 G/DL
ALBUMIN/GLOB SERPL: 1.2 G/DL
ALBUMIN/GLOB SERPL: 1.3 G/DL
ALBUMIN/GLOB SERPL: 1.3 G/DL
ALP SERPL-CCNC: 44 U/L (ref 39–117)
ALP SERPL-CCNC: 50 U/L (ref 39–117)
ALP SERPL-CCNC: 55 U/L (ref 39–117)
ALP SERPL-CCNC: 64 U/L (ref 39–117)
ALP SERPL-CCNC: 67 U/L (ref 39–117)
ALP SERPL-CCNC: 77 U/L (ref 39–117)
ALP SERPL-CCNC: 80 U/L (ref 39–117)
ALP SERPL-CCNC: 91 U/L (ref 39–117)
ALT SERPL W P-5'-P-CCNC: 14 U/L (ref 1–41)
ALT SERPL W P-5'-P-CCNC: 15 U/L (ref 1–41)
ALT SERPL W P-5'-P-CCNC: 15 U/L (ref 1–41)
ALT SERPL W P-5'-P-CCNC: 20 U/L (ref 1–41)
ALT SERPL W P-5'-P-CCNC: 22 U/L (ref 1–41)
ALT SERPL W P-5'-P-CCNC: 33 U/L (ref 1–41)
ALT SERPL W P-5'-P-CCNC: 44 U/L (ref 1–41)
ALT SERPL W P-5'-P-CCNC: 54 U/L (ref 1–41)
AMMONIA BLD-SCNC: 32 UMOL/L (ref 16–60)
AMPHET+METHAMPHET UR QL: NEGATIVE
AMPHET+METHAMPHET UR QL: NEGATIVE
AMPHETAMINES UR QL: NEGATIVE
AMPHETAMINES UR QL: NEGATIVE
AMYLASE SERPL-CCNC: 67 U/L (ref 28–100)
ANION GAP SERPL CALCULATED.3IONS-SCNC: 10 MMOL/L (ref 5–15)
ANION GAP SERPL CALCULATED.3IONS-SCNC: 10 MMOL/L (ref 5–15)
ANION GAP SERPL CALCULATED.3IONS-SCNC: 11 MMOL/L (ref 5–15)
ANION GAP SERPL CALCULATED.3IONS-SCNC: 11 MMOL/L (ref 5–15)
ANION GAP SERPL CALCULATED.3IONS-SCNC: 12 MMOL/L (ref 5–15)
ANION GAP SERPL CALCULATED.3IONS-SCNC: 13 MMOL/L (ref 5–15)
ANION GAP SERPL CALCULATED.3IONS-SCNC: 13 MMOL/L (ref 5–15)
ANION GAP SERPL CALCULATED.3IONS-SCNC: 14 MMOL/L (ref 5–15)
ANION GAP SERPL CALCULATED.3IONS-SCNC: 9 MMOL/L (ref 5–15)
APPEARANCE FLD: ABNORMAL
APPEARANCE FLD: CLEAR
APTT PPP: 25.6 SECONDS (ref 24–37)
APTT PPP: 26 SECONDS (ref 24–37)
APTT PPP: 26.5 SECONDS (ref 24–37)
ARTERIAL PATENCY WRIST A: ABNORMAL
ARTERIAL PATENCY WRIST A: ABNORMAL
AST SERPL-CCNC: 14 U/L (ref 1–40)
AST SERPL-CCNC: 16 U/L (ref 1–40)
AST SERPL-CCNC: 17 U/L (ref 1–40)
AST SERPL-CCNC: 17 U/L (ref 1–40)
AST SERPL-CCNC: 18 U/L (ref 1–40)
AST SERPL-CCNC: 18 U/L (ref 1–40)
AST SERPL-CCNC: 23 U/L (ref 1–40)
AST SERPL-CCNC: 28 U/L (ref 1–40)
ATMOSPHERIC PRESS: ABNORMAL MM[HG]
ATMOSPHERIC PRESS: ABNORMAL MM[HG]
B PARAPERT DNA SPEC QL NAA+PROBE: NOT DETECTED
B PARAPERT DNA SPEC QL NAA+PROBE: NOT DETECTED
B PERT DNA SPEC QL NAA+PROBE: NOT DETECTED
B PERT DNA SPEC QL NAA+PROBE: NOT DETECTED
BACTERIA BLD CULT: NORMAL
BACTERIA BLD CULT: NORMAL
BACTERIA FLD CULT: NORMAL
BACTERIA FLD CULT: NORMAL
BACTERIA SPEC AEROBE CULT: ABNORMAL
BACTERIA SPEC AEROBE CULT: NORMAL
BACTERIA UR QL AUTO: NORMAL /HPF
BARBITURATES UR QL SCN: NEGATIVE
BARBITURATES UR QL SCN: NEGATIVE
BASE EXCESS BLDA CALC-SCNC: -1.2 MMOL/L (ref 0–2)
BASE EXCESS BLDA CALC-SCNC: 2 MMOL/L (ref -5–5)
BASE EXCESS BLDA CALC-SCNC: 4.2 MMOL/L (ref 0–2)
BASE EXCESS BLDA CALC-SCNC: 8 MMOL/L (ref -5–5)
BASOPHILS # BLD AUTO: 0.02 10*3/MM3 (ref 0–0.2)
BASOPHILS # BLD AUTO: 0.04 10*3/MM3 (ref 0–0.2)
BASOPHILS # BLD AUTO: 0.04 10*3/MM3 (ref 0–0.2)
BASOPHILS # BLD AUTO: 0.06 10*3/MM3 (ref 0–0.2)
BASOPHILS # BLD AUTO: 0.07 10*3/MM3 (ref 0–0.2)
BASOPHILS NFR BLD AUTO: 0.6 % (ref 0–1.5)
BASOPHILS NFR BLD AUTO: 0.6 % (ref 0–1.5)
BASOPHILS NFR BLD AUTO: 0.7 % (ref 0–1.5)
BASOPHILS NFR BLD AUTO: 0.7 % (ref 0–1.5)
BASOPHILS NFR BLD AUTO: 1 % (ref 0–1.5)
BDY SITE: ABNORMAL
BDY SITE: ABNORMAL
BENZODIAZ UR QL SCN: NEGATIVE
BENZODIAZ UR QL SCN: NEGATIVE
BH CV ECHO MEAS - AO MAX PG (FULL): 3.1 MMHG
BH CV ECHO MEAS - AO MAX PG: 10 MMHG
BH CV ECHO MEAS - AO ROOT AREA (BSA CORRECTED): 1.7
BH CV ECHO MEAS - AO ROOT AREA: 9.1 CM^2
BH CV ECHO MEAS - AO ROOT DIAM: 3.4 CM
BH CV ECHO MEAS - AO V2 MAX: 160 CM/SEC
BH CV ECHO MEAS - AVA(V,A): 2.8 CM^2
BH CV ECHO MEAS - AVA(V,D): 2.8 CM^2
BH CV ECHO MEAS - BSA(HAYCOCK): 2.1 M^2
BH CV ECHO MEAS - BSA: 2 M^2
BH CV ECHO MEAS - BZI_BMI: 32 KILOGRAMS/M^2
BH CV ECHO MEAS - BZI_METRIC_HEIGHT: 167 CM
BH CV ECHO MEAS - BZI_METRIC_WEIGHT: 89.4 KG
BH CV ECHO MEAS - EDV(CUBED): 157.5 ML
BH CV ECHO MEAS - EDV(MOD-SP2): 81.6 ML
BH CV ECHO MEAS - EDV(MOD-SP4): 91.9 ML
BH CV ECHO MEAS - EDV(TEICH): 141.3 ML
BH CV ECHO MEAS - EF(CUBED): 62.3 %
BH CV ECHO MEAS - EF(MOD-BP): 56.6 %
BH CV ECHO MEAS - EF(MOD-SP2): 54.7 %
BH CV ECHO MEAS - EF(MOD-SP4): 56.1 %
BH CV ECHO MEAS - EF(TEICH): 53.4 %
BH CV ECHO MEAS - ESV(CUBED): 59.3 ML
BH CV ECHO MEAS - ESV(MOD-SP2): 37 ML
BH CV ECHO MEAS - ESV(MOD-SP4): 40.3 ML
BH CV ECHO MEAS - ESV(TEICH): 65.9 ML
BH CV ECHO MEAS - FS: 27.8 %
BH CV ECHO MEAS - IVS/LVPW: 1
BH CV ECHO MEAS - IVSD: 1 CM
BH CV ECHO MEAS - LA DIMENSION: 3.6 CM
BH CV ECHO MEAS - LA/AO: 1.1
BH CV ECHO MEAS - LAD MAJOR: 6 CM
BH CV ECHO MEAS - LAT PEAK E' VEL: 8.3 CM/SEC
BH CV ECHO MEAS - LATERAL E/E' RATIO: 9.2
BH CV ECHO MEAS - LV DIASTOLIC VOL/BSA (35-75): 46.4 ML/M^2
BH CV ECHO MEAS - LV MASS(C)D: 206.7 GRAMS
BH CV ECHO MEAS - LV MASS(C)DI: 104.3 GRAMS/M^2
BH CV ECHO MEAS - LV MAX PG: 6.9 MMHG
BH CV ECHO MEAS - LV MEAN PG: 3 MMHG
BH CV ECHO MEAS - LV SYSTOLIC VOL/BSA (12-30): 20.3 ML/M^2
BH CV ECHO MEAS - LV V1 MAX: 131 CM/SEC
BH CV ECHO MEAS - LV V1 MEAN: 83.5 CM/SEC
BH CV ECHO MEAS - LV V1 VTI: 21.4 CM
BH CV ECHO MEAS - LVIDD: 5.4 CM
BH CV ECHO MEAS - LVIDS: 3.9 CM
BH CV ECHO MEAS - LVLD AP2: 8.2 CM
BH CV ECHO MEAS - LVLD AP4: 7.7 CM
BH CV ECHO MEAS - LVLS AP2: 7.1 CM
BH CV ECHO MEAS - LVLS AP4: 7 CM
BH CV ECHO MEAS - LVOT AREA (M): 3.5 CM^2
BH CV ECHO MEAS - LVOT AREA: 3.5 CM^2
BH CV ECHO MEAS - LVOT DIAM: 2.1 CM
BH CV ECHO MEAS - LVPWD: 1 CM
BH CV ECHO MEAS - MED PEAK E' VEL: 6 CM/SEC
BH CV ECHO MEAS - MEDIAL E/E' RATIO: 12.8
BH CV ECHO MEAS - MV A MAX VEL: 70.3 CM/SEC
BH CV ECHO MEAS - MV DEC SLOPE: 338 CM/SEC^2
BH CV ECHO MEAS - MV DEC TIME: 0.23 SEC
BH CV ECHO MEAS - MV E MAX VEL: 76.3 CM/SEC
BH CV ECHO MEAS - MV E/A: 1.1
BH CV ECHO MEAS - PA ACC TIME: 0.09 SEC
BH CV ECHO MEAS - PA MAX PG: 3.8 MMHG
BH CV ECHO MEAS - PA PR(ACCEL): 39.4 MMHG
BH CV ECHO MEAS - PA V2 MAX: 97.9 CM/SEC
BH CV ECHO MEAS - RAP SYSTOLE: 3 MMHG
BH CV ECHO MEAS - RVSP: 27 MMHG
BH CV ECHO MEAS - SI(CUBED): 49.5 ML/M^2
BH CV ECHO MEAS - SI(LVOT): 37.4 ML/M^2
BH CV ECHO MEAS - SI(MOD-SP2): 22.5 ML/M^2
BH CV ECHO MEAS - SI(MOD-SP4): 26 ML/M^2
BH CV ECHO MEAS - SI(TEICH): 38 ML/M^2
BH CV ECHO MEAS - SV(CUBED): 98.1 ML
BH CV ECHO MEAS - SV(LVOT): 74.1 ML
BH CV ECHO MEAS - SV(MOD-SP2): 44.6 ML
BH CV ECHO MEAS - SV(MOD-SP4): 51.6 ML
BH CV ECHO MEAS - SV(TEICH): 75.4 ML
BH CV ECHO MEAS - TAPSE (>1.6): 2.23 CM2
BH CV ECHO MEAS - TR MAX PG: 24 MMHG
BH CV ECHO MEAS - TR MAX VEL: 243 CM/SEC
BH CV ECHO MEASUREMENTS AVERAGE E/E' RATIO: 10.67
BH CV VAS BP LEFT ARM: NORMAL MMHG
BH CV XLRA - RV BASE: 3.2 CM
BH CV XLRA - RV LENGTH: 8.3 CM
BH CV XLRA - RV MID: 2.9 CM
BH CV XLRA - TDI S': 14.1 CM/SEC
BILIRUB SERPL-MCNC: 0.2 MG/DL (ref 0.2–1.2)
BILIRUB SERPL-MCNC: 0.3 MG/DL (ref 0.2–1.2)
BILIRUB SERPL-MCNC: 0.3 MG/DL (ref 0.2–1.2)
BILIRUB SERPL-MCNC: 0.4 MG/DL (ref 0.2–1.2)
BILIRUB SERPL-MCNC: 0.5 MG/DL (ref 0.2–1.2)
BILIRUB SERPL-MCNC: 0.5 MG/DL (ref 0.2–1.2)
BILIRUB SERPL-MCNC: 0.6 MG/DL (ref 0.2–1.2)
BILIRUB SERPL-MCNC: 0.6 MG/DL (ref 0.2–1.2)
BILIRUB UR QL STRIP: NEGATIVE
BLD GP AB SCN SERPL QL: NEGATIVE
BODY TEMPERATURE: 37 C
BODY TEMPERATURE: 37 C
BUN BLD-MCNC: 21 MG/DL (ref 6–20)
BUN BLD-MCNC: 22 MG/DL (ref 6–20)
BUN BLD-MCNC: 23 MG/DL (ref 6–20)
BUN BLD-MCNC: 23 MG/DL (ref 6–20)
BUN BLD-MCNC: 28 MG/DL (ref 6–20)
BUN BLD-MCNC: 35 MG/DL (ref 6–20)
BUN BLD-MCNC: 42 MG/DL (ref 6–20)
BUN/CREAT SERPL: 12 (ref 7–25)
BUN/CREAT SERPL: 12.4 (ref 7–25)
BUN/CREAT SERPL: 12.4 (ref 7–25)
BUN/CREAT SERPL: 14.6 (ref 7–25)
BUN/CREAT SERPL: 17.2 (ref 7–25)
BUN/CREAT SERPL: 18 (ref 7–25)
BUN/CREAT SERPL: 22.6 (ref 7–25)
BUN/CREAT SERPL: 23.3 (ref 7–25)
BUN/CREAT SERPL: 23.9 (ref 7–25)
BUPRENORPHINE SERPL-MCNC: NEGATIVE NG/ML
BUPRENORPHINE SERPL-MCNC: NEGATIVE NG/ML
C PNEUM DNA NPH QL NAA+NON-PROBE: NOT DETECTED
C PNEUM DNA NPH QL NAA+NON-PROBE: NOT DETECTED
CA-I BLDA-SCNC: 1.2 MMOL/L (ref 1.2–1.32)
CA-I BLDA-SCNC: 1.34 MMOL/L (ref 1.2–1.32)
CALCIUM SPEC-SCNC: 8.3 MG/DL (ref 8.6–10.5)
CALCIUM SPEC-SCNC: 8.6 MG/DL (ref 8.6–10.5)
CALCIUM SPEC-SCNC: 8.7 MG/DL (ref 8.6–10.5)
CALCIUM SPEC-SCNC: 8.9 MG/DL (ref 8.6–10.5)
CALCIUM SPEC-SCNC: 8.9 MG/DL (ref 8.6–10.5)
CALCIUM SPEC-SCNC: 9 MG/DL (ref 8.6–10.5)
CALCIUM SPEC-SCNC: 9.3 MG/DL (ref 8.6–10.5)
CANNABINOIDS SERPL QL: NEGATIVE
CANNABINOIDS SERPL QL: NEGATIVE
CHLORIDE SERPL-SCNC: 100 MMOL/L (ref 98–107)
CHLORIDE SERPL-SCNC: 100 MMOL/L (ref 98–107)
CHLORIDE SERPL-SCNC: 101 MMOL/L (ref 98–107)
CHLORIDE SERPL-SCNC: 102 MMOL/L (ref 98–107)
CHLORIDE SERPL-SCNC: 102 MMOL/L (ref 98–107)
CHLORIDE SERPL-SCNC: 105 MMOL/L (ref 98–107)
CHLORIDE SERPL-SCNC: 94 MMOL/L (ref 98–107)
CHLORIDE SERPL-SCNC: 95 MMOL/L (ref 98–107)
CHLORIDE SERPL-SCNC: 97 MMOL/L (ref 98–107)
CLARITY UR: CLEAR
CO2 BLDA-SCNC: 27.8 MMOL/L (ref 22–33)
CO2 BLDA-SCNC: 31 MMOL/L (ref 24–29)
CO2 BLDA-SCNC: 39.8 MMOL/L (ref 22–33)
CO2 BLDA-SCNC: 40 MMOL/L (ref 24–29)
CO2 SERPL-SCNC: 25 MMOL/L (ref 22–29)
CO2 SERPL-SCNC: 25 MMOL/L (ref 22–29)
CO2 SERPL-SCNC: 27 MMOL/L (ref 22–29)
CO2 SERPL-SCNC: 28 MMOL/L (ref 22–29)
CO2 SERPL-SCNC: 28 MMOL/L (ref 22–29)
CO2 SERPL-SCNC: 32 MMOL/L (ref 22–29)
CO2 SERPL-SCNC: 34 MMOL/L (ref 22–29)
COCAINE UR QL: NEGATIVE
COCAINE UR QL: NEGATIVE
COHGB MFR BLD: 1.1 % (ref 0–2)
COHGB MFR BLD: 1.5 % (ref 0–2)
COLOR FLD: YELLOW
COLOR FLD: YELLOW
COLOR UR: YELLOW
CREAT BLD-MCNC: 1.17 MG/DL (ref 0.76–1.27)
CREAT BLD-MCNC: 1.28 MG/DL (ref 0.76–1.27)
CREAT BLD-MCNC: 1.28 MG/DL (ref 0.76–1.27)
CREAT BLD-MCNC: 1.44 MG/DL (ref 0.76–1.27)
CREAT BLD-MCNC: 1.5 MG/DL (ref 0.76–1.27)
CREAT BLD-MCNC: 1.69 MG/DL (ref 0.76–1.27)
CREAT BLD-MCNC: 1.69 MG/DL (ref 0.76–1.27)
CREAT BLD-MCNC: 1.86 MG/DL (ref 0.76–1.27)
CREAT BLD-MCNC: 1.91 MG/DL (ref 0.76–1.27)
CREAT BLDA-MCNC: 1.3 MG/DL (ref 0.6–1.3)
CREAT BLDA-MCNC: 1.5 MG/DL (ref 0.6–1.3)
CREAT BLDA-MCNC: 2 MG/DL (ref 0.6–1.3)
CREAT BLDA-MCNC: 2 MG/DL (ref 0.6–1.3)
CREAT SERPL-MCNC: 1.5 MG/DL
CRP SERPL-MCNC: 0.92 MG/DL (ref 0–0.5)
CYTO UR: NORMAL
D DIMER PPP FEU-MCNC: 2.61 MCGFEU/ML (ref 0–0.56)
D-LACTATE SERPL-SCNC: 1.8 MMOL/L (ref 0.5–2)
D-LACTATE SERPL-SCNC: 2.1 MMOL/L (ref 0.5–2)
D-LACTATE SERPL-SCNC: 2.5 MMOL/L (ref 0.5–2)
D-LACTATE SERPL-SCNC: 2.5 MMOL/L (ref 0.5–2)
D-LACTATE SERPL-SCNC: 2.6 MMOL/L (ref 0.5–2)
DEPRECATED RDW RBC AUTO: 42.2 FL (ref 37–54)
DEPRECATED RDW RBC AUTO: 42.7 FL (ref 37–54)
DEPRECATED RDW RBC AUTO: 43.9 FL (ref 37–54)
DEPRECATED RDW RBC AUTO: 44 FL (ref 37–54)
DEPRECATED RDW RBC AUTO: 55.3 FL (ref 37–54)
DX PRELIMINARY: NORMAL
EOSINOPHIL # BLD AUTO: 0.02 10*3/MM3 (ref 0–0.4)
EOSINOPHIL # BLD AUTO: 0.04 10*3/MM3 (ref 0–0.4)
EOSINOPHIL # BLD AUTO: 0.06 10*3/MM3 (ref 0–0.4)
EOSINOPHIL # BLD AUTO: 0.1 10*3/MM3 (ref 0–0.4)
EOSINOPHIL # BLD AUTO: 0.12 10*3/MM3 (ref 0–0.4)
EOSINOPHIL NFR BLD AUTO: 0.3 % (ref 0.3–6.2)
EOSINOPHIL NFR BLD AUTO: 0.7 % (ref 0.3–6.2)
EOSINOPHIL NFR BLD AUTO: 1 % (ref 0.3–6.2)
EOSINOPHIL NFR BLD AUTO: 1.5 % (ref 0.3–6.2)
EOSINOPHIL NFR BLD AUTO: 2.1 % (ref 0.3–6.2)
EOSINOPHIL NFR FLD MANUAL: 1 %
ERYTHROCYTE [DISTWIDTH] IN BLOOD BY AUTOMATED COUNT: 13.6 % (ref 12.3–15.4)
ERYTHROCYTE [DISTWIDTH] IN BLOOD BY AUTOMATED COUNT: 13.8 % (ref 12.3–15.4)
ERYTHROCYTE [DISTWIDTH] IN BLOOD BY AUTOMATED COUNT: 13.8 % (ref 12.3–15.4)
ERYTHROCYTE [DISTWIDTH] IN BLOOD BY AUTOMATED COUNT: 14 % (ref 12.3–15.4)
ERYTHROCYTE [DISTWIDTH] IN BLOOD BY AUTOMATED COUNT: 15 % (ref 12.3–15.4)
ERYTHROCYTE [DISTWIDTH] IN BLOOD BY AUTOMATED COUNT: 15.1 % (ref 12.3–15.4)
ERYTHROCYTE [DISTWIDTH] IN BLOOD BY AUTOMATED COUNT: 15.3 % (ref 12.3–15.4)
ERYTHROCYTE [DISTWIDTH] IN BLOOD BY AUTOMATED COUNT: 15.6 % (ref 12.3–15.4)
ERYTHROCYTE [DISTWIDTH] IN BLOOD BY AUTOMATED COUNT: 15.6 % (ref 12.3–15.4)
ERYTHROCYTE [DISTWIDTH] IN BLOOD BY AUTOMATED COUNT: 16 % (ref 12.3–15.4)
FLUAV AG NPH QL: NEGATIVE
FLUAV AG NPH QL: NEGATIVE
FLUAV H1 2009 PAND RNA NPH QL NAA+PROBE: NOT DETECTED
FLUAV H1 2009 PAND RNA NPH QL NAA+PROBE: NOT DETECTED
FLUAV H1 HA GENE NPH QL NAA+PROBE: NOT DETECTED
FLUAV H1 HA GENE NPH QL NAA+PROBE: NOT DETECTED
FLUAV H3 RNA NPH QL NAA+PROBE: NOT DETECTED
FLUAV H3 RNA NPH QL NAA+PROBE: NOT DETECTED
FLUAV SUBTYP SPEC NAA+PROBE: NOT DETECTED
FLUAV SUBTYP SPEC NAA+PROBE: NOT DETECTED
FLUBV AG NPH QL IA: NEGATIVE
FLUBV AG NPH QL IA: NEGATIVE
FLUBV RNA ISLT QL NAA+PROBE: NOT DETECTED
FLUBV RNA ISLT QL NAA+PROBE: NOT DETECTED
GFR SERPL CREATININE-BSD FRML MDRD: 37 ML/MIN/1.73
GFR SERPL CREATININE-BSD FRML MDRD: 38 ML/MIN/1.73
GFR SERPL CREATININE-BSD FRML MDRD: 42 ML/MIN/1.73
GFR SERPL CREATININE-BSD FRML MDRD: 42 ML/MIN/1.73
GFR SERPL CREATININE-BSD FRML MDRD: 49 ML/MIN/1.73
GFR SERPL CREATININE-BSD FRML MDRD: 51 ML/MIN/1.73
GFR SERPL CREATININE-BSD FRML MDRD: 58 ML/MIN/1.73
GFR SERPL CREATININE-BSD FRML MDRD: 58 ML/MIN/1.73
GFR SERPL CREATININE-BSD FRML MDRD: 65 ML/MIN/1.73
GLOBULIN UR ELPH-MCNC: 2.6 GM/DL
GLOBULIN UR ELPH-MCNC: 2.6 GM/DL
GLOBULIN UR ELPH-MCNC: 2.7 GM/DL
GLOBULIN UR ELPH-MCNC: 2.9 GM/DL
GLOBULIN UR ELPH-MCNC: 3 GM/DL
GLOBULIN UR ELPH-MCNC: 3.1 GM/DL
GLOBULIN UR ELPH-MCNC: 3.1 GM/DL
GLOBULIN UR ELPH-MCNC: 3.2 GM/DL
GLUCOSE BLD-MCNC: 104 MG/DL (ref 65–99)
GLUCOSE BLD-MCNC: 107 MG/DL (ref 65–99)
GLUCOSE BLD-MCNC: 109 MG/DL (ref 65–99)
GLUCOSE BLD-MCNC: 110 MG/DL (ref 65–99)
GLUCOSE BLD-MCNC: 118 MG/DL (ref 65–99)
GLUCOSE BLD-MCNC: 145 MG/DL (ref 65–99)
GLUCOSE BLD-MCNC: 86 MG/DL (ref 65–99)
GLUCOSE BLD-MCNC: 92 MG/DL (ref 65–99)
GLUCOSE BLD-MCNC: 99 MG/DL (ref 65–99)
GLUCOSE FLD-MCNC: 77 MG/DL
GLUCOSE UR STRIP-MCNC: NEGATIVE MG/DL
GRAM STN SPEC: ABNORMAL
GRAM STN SPEC: NORMAL
HADV DNA SPEC NAA+PROBE: NOT DETECTED
HADV DNA SPEC NAA+PROBE: NOT DETECTED
HCO3 BLDA-SCNC: 26.1 MMOL/L (ref 20–26)
HCO3 BLDA-SCNC: 28.9 MMOL/L (ref 22–26)
HCO3 BLDA-SCNC: 36.4 MMOL/L (ref 20–26)
HCO3 BLDA-SCNC: 36.7 MMOL/L (ref 22–26)
HCOV 229E RNA SPEC QL NAA+PROBE: NOT DETECTED
HCOV 229E RNA SPEC QL NAA+PROBE: NOT DETECTED
HCOV HKU1 RNA SPEC QL NAA+PROBE: NOT DETECTED
HCOV HKU1 RNA SPEC QL NAA+PROBE: NOT DETECTED
HCOV NL63 RNA SPEC QL NAA+PROBE: NOT DETECTED
HCOV NL63 RNA SPEC QL NAA+PROBE: NOT DETECTED
HCOV OC43 RNA SPEC QL NAA+PROBE: NOT DETECTED
HCOV OC43 RNA SPEC QL NAA+PROBE: NOT DETECTED
HCT VFR BLD AUTO: 37.2 % (ref 37.5–51)
HCT VFR BLD AUTO: 39 % (ref 37.5–51)
HCT VFR BLD AUTO: 43.2 % (ref 37.5–51)
HCT VFR BLD AUTO: 43.8 % (ref 37.5–51)
HCT VFR BLD AUTO: 44 % (ref 37.5–51)
HCT VFR BLD AUTO: 44.7 % (ref 37.5–51)
HCT VFR BLD AUTO: 45.7 % (ref 37.5–51)
HCT VFR BLD AUTO: 47.9 % (ref 37.5–51)
HCT VFR BLD AUTO: 48.8 % (ref 37.5–51)
HCT VFR BLD AUTO: 49.2 % (ref 37.5–51)
HCT VFR BLD CALC: 34.9 %
HCT VFR BLD CALC: 36.9 %
HCT VFR BLDA CALC: 35 % (ref 38–51)
HCT VFR BLDA CALC: 35 % (ref 38–51)
HGB BLD-MCNC: 12.1 G/DL (ref 13–17.7)
HGB BLD-MCNC: 12.2 G/DL (ref 13–17.7)
HGB BLD-MCNC: 14.2 G/DL (ref 13–17.7)
HGB BLD-MCNC: 14.9 G/DL (ref 13–17.7)
HGB BLD-MCNC: 15.1 G/DL (ref 13–17.7)
HGB BLD-MCNC: 15.3 G/DL (ref 13–17.7)
HGB BLD-MCNC: 15.4 G/DL (ref 13–17.7)
HGB BLD-MCNC: 15.9 G/DL (ref 13–17.7)
HGB BLD-MCNC: 16 G/DL (ref 13–17.7)
HGB BLD-MCNC: 16.7 G/DL (ref 13–17.7)
HGB BLDA-MCNC: 11.4 G/DL (ref 13.5–17.5)
HGB BLDA-MCNC: 11.9 G/DL (ref 12–17)
HGB BLDA-MCNC: 11.9 G/DL (ref 12–17)
HGB BLDA-MCNC: 12 G/DL (ref 13.5–17.5)
HGB UR QL STRIP.AUTO: NEGATIVE
HMPV RNA NPH QL NAA+NON-PROBE: NOT DETECTED
HMPV RNA NPH QL NAA+NON-PROBE: NOT DETECTED
HOLD SPECIMEN: NORMAL
HOROWITZ INDEX BLD+IHG-RTO: 40 %
HOROWITZ INDEX BLD+IHG-RTO: 60 %
HPIV1 RNA SPEC QL NAA+PROBE: NOT DETECTED
HPIV1 RNA SPEC QL NAA+PROBE: NOT DETECTED
HPIV2 RNA SPEC QL NAA+PROBE: NOT DETECTED
HPIV2 RNA SPEC QL NAA+PROBE: NOT DETECTED
HPIV3 RNA NPH QL NAA+PROBE: NOT DETECTED
HPIV3 RNA NPH QL NAA+PROBE: NOT DETECTED
HPIV4 P GENE NPH QL NAA+PROBE: NOT DETECTED
HPIV4 P GENE NPH QL NAA+PROBE: NOT DETECTED
HYALINE CASTS UR QL AUTO: NORMAL /LPF
IMM GRANULOCYTES # BLD AUTO: 0.03 10*3/MM3 (ref 0–0.05)
IMM GRANULOCYTES # BLD AUTO: 0.05 10*3/MM3 (ref 0–0.05)
IMM GRANULOCYTES # BLD AUTO: 0.05 10*3/MM3 (ref 0–0.05)
IMM GRANULOCYTES # BLD AUTO: 0.09 10*3/MM3 (ref 0–0.05)
IMM GRANULOCYTES # BLD AUTO: 0.14 10*3/MM3 (ref 0–0.05)
IMM GRANULOCYTES NFR BLD AUTO: 0.4 % (ref 0–0.5)
IMM GRANULOCYTES NFR BLD AUTO: 0.7 % (ref 0–0.5)
IMM GRANULOCYTES NFR BLD AUTO: 0.9 % (ref 0–0.5)
IMM GRANULOCYTES NFR BLD AUTO: 1.9 % (ref 0–0.5)
IMM GRANULOCYTES NFR BLD AUTO: 2.3 % (ref 0–0.5)
INR PPP: 1.02 (ref 0.85–1.16)
INR PPP: 1.1 (ref 0.8–1.2)
INR PPP: 1.2 (ref 0.8–1.2)
ISOLATED FROM: ABNORMAL
KETONES UR QL STRIP: NEGATIVE
KOH PREP NAIL: NORMAL
LAB AP CASE REPORT: NORMAL
LAB AP CLINICAL INFORMATION: NORMAL
LAB AP DIAGNOSIS COMMENT: NORMAL
LACTATE HOLD SPECIMEN: NORMAL
LACTATE HOLD SPECIMEN: NORMAL
LDH FLD-CCNC: 245 U/L
LDH FLD-CCNC: 255 U/L
LDH SERPL-CCNC: 323 U/L (ref 135–225)
LEFT ATRIUM VOLUME INDEX: 23.9 ML/M^2
LEFT ATRIUM VOLUME: 47.3 ML
LEUKOCYTE ESTERASE UR QL STRIP.AUTO: NEGATIVE
LIPASE SERPL-CCNC: 42 U/L (ref 13–60)
LV EF 2D ECHO EST: 55 %
LYMPHOCYTES # BLD AUTO: 1 10*3/MM3 (ref 0.7–3.1)
LYMPHOCYTES # BLD AUTO: 1 10*3/MM3 (ref 0.7–3.1)
LYMPHOCYTES # BLD AUTO: 1.18 10*3/MM3 (ref 0.7–3.1)
LYMPHOCYTES # BLD AUTO: 1.28 10*3/MM3 (ref 0.7–3.1)
LYMPHOCYTES # BLD AUTO: 1.3 10*3/MM3 (ref 0.7–3.1)
LYMPHOCYTES # BLD AUTO: 1.32 10*3/MM3 (ref 0.7–3.1)
LYMPHOCYTES # BLD AUTO: 1.39 10*3/MM3 (ref 0.7–3.1)
LYMPHOCYTES # BLD AUTO: 1.4 10*3/MM3 (ref 0.7–3.1)
LYMPHOCYTES # BLD AUTO: 1.5 10*3/MM3 (ref 0.7–3.1)
LYMPHOCYTES # BLD AUTO: 1.6 10*3/MM3 (ref 0.7–3.1)
LYMPHOCYTES NFR BLD AUTO: 10.1 % (ref 19.6–45.3)
LYMPHOCYTES NFR BLD AUTO: 10.8 % (ref 19.6–45.3)
LYMPHOCYTES NFR BLD AUTO: 18.1 % (ref 19.6–45.3)
LYMPHOCYTES NFR BLD AUTO: 19 % (ref 19.6–45.3)
LYMPHOCYTES NFR BLD AUTO: 19.4 % (ref 19.6–45.3)
LYMPHOCYTES NFR BLD AUTO: 20.2 % (ref 19.6–45.3)
LYMPHOCYTES NFR BLD AUTO: 21.3 % (ref 19.6–45.3)
LYMPHOCYTES NFR BLD AUTO: 22.1 % (ref 19.6–45.3)
LYMPHOCYTES NFR BLD AUTO: 37.3 % (ref 19.6–45.3)
LYMPHOCYTES NFR BLD AUTO: 9.6 % (ref 19.6–45.3)
LYMPHOCYTES NFR FLD MANUAL: 33 %
LYMPHOCYTES NFR FLD MANUAL: 60 %
Lab: ABNORMAL
M PNEUMO IGG SER IA-ACNC: NOT DETECTED
M PNEUMO IGG SER IA-ACNC: NOT DETECTED
MACROPHAGE FLUID: 4 %
MACROPHAGE FLUID: 8 %
MAGNESIUM SERPL-MCNC: 2 MG/DL (ref 1.6–2.6)
MAGNESIUM SERPL-MCNC: 2.4 MG/DL (ref 1.6–2.6)
MAXIMAL PREDICTED HEART RATE: 165 BPM
MCH RBC QN AUTO: 28.2 PG (ref 26.6–33)
MCH RBC QN AUTO: 28.4 PG (ref 26.6–33)
MCH RBC QN AUTO: 28.5 PG (ref 26.6–33)
MCH RBC QN AUTO: 28.8 PG (ref 26.6–33)
MCH RBC QN AUTO: 29.2 PG (ref 26.6–33)
MCH RBC QN AUTO: 29.3 PG (ref 26.6–33)
MCH RBC QN AUTO: 29.4 PG (ref 26.6–33)
MCH RBC QN AUTO: 29.4 PG (ref 26.6–33)
MCH RBC QN AUTO: 29.7 PG (ref 26.6–33)
MCH RBC QN AUTO: 30.3 PG (ref 26.6–33)
MCHC RBC AUTO-ENTMCNC: 31 G/DL (ref 31.5–35.7)
MCHC RBC AUTO-ENTMCNC: 32.8 G/DL (ref 31.5–35.7)
MCHC RBC AUTO-ENTMCNC: 32.8 G/DL (ref 31.5–35.7)
MCHC RBC AUTO-ENTMCNC: 33 G/DL (ref 31.5–35.7)
MCHC RBC AUTO-ENTMCNC: 33.2 G/DL (ref 31.5–35.7)
MCHC RBC AUTO-ENTMCNC: 33.7 G/DL (ref 31.5–35.7)
MCHC RBC AUTO-ENTMCNC: 33.9 G/DL (ref 31.5–35.7)
MCHC RBC AUTO-ENTMCNC: 34 G/DL (ref 31.5–35.7)
MCHC RBC AUTO-ENTMCNC: 34.2 G/DL (ref 31.5–35.7)
MCHC RBC AUTO-ENTMCNC: 34.5 G/DL (ref 31.5–35.7)
MCV RBC AUTO: 85.5 FL (ref 79–97)
MCV RBC AUTO: 85.9 FL (ref 79–97)
MCV RBC AUTO: 85.9 FL (ref 79–97)
MCV RBC AUTO: 86.1 FL (ref 79–97)
MCV RBC AUTO: 86.1 FL (ref 79–97)
MCV RBC AUTO: 86.6 FL (ref 79–97)
MCV RBC AUTO: 86.8 FL (ref 79–97)
MCV RBC AUTO: 87.9 FL (ref 79–97)
MCV RBC AUTO: 89.1 FL (ref 79–97)
MCV RBC AUTO: 94.9 FL (ref 79–97)
MESOTHL CELL NFR FLD MANUAL: 12 %
MESOTHL CELL NFR FLD MANUAL: 3 %
METHADONE UR QL SCN: NEGATIVE
METHADONE UR QL SCN: NEGATIVE
METHGB BLD QL: 0.7 % (ref 0–1.5)
METHGB BLD QL: 1.3 % (ref 0–1.5)
MODALITY: ABNORMAL
MODALITY: ABNORMAL
MONOCYTES # BLD AUTO: 0.1 10*3/MM3 (ref 0.1–0.9)
MONOCYTES # BLD AUTO: 0.24 10*3/MM3 (ref 0.1–0.9)
MONOCYTES # BLD AUTO: 0.4 10*3/MM3 (ref 0.1–0.9)
MONOCYTES # BLD AUTO: 0.59 10*3/MM3 (ref 0.1–0.9)
MONOCYTES # BLD AUTO: 0.61 10*3/MM3 (ref 0.1–0.9)
MONOCYTES # BLD AUTO: 0.65 10*3/MM3 (ref 0.1–0.9)
MONOCYTES # BLD AUTO: 0.67 10*3/MM3 (ref 0.1–0.9)
MONOCYTES # BLD AUTO: 0.7 10*3/MM3 (ref 0.1–0.9)
MONOCYTES NFR BLD AUTO: 1 % (ref 5–12)
MONOCYTES NFR BLD AUTO: 10.5 % (ref 5–12)
MONOCYTES NFR BLD AUTO: 10.8 % (ref 5–12)
MONOCYTES NFR BLD AUTO: 3.2 % (ref 5–12)
MONOCYTES NFR BLD AUTO: 4.5 % (ref 5–12)
MONOCYTES NFR BLD AUTO: 5.3 % (ref 5–12)
MONOCYTES NFR BLD AUTO: 6 % (ref 5–12)
MONOCYTES NFR BLD AUTO: 8.6 % (ref 5–12)
MONOCYTES NFR BLD AUTO: 9 % (ref 5–12)
MONOCYTES NFR BLD AUTO: 9.4 % (ref 5–12)
MONOCYTES NFR FLD: 12 %
MONOCYTES NFR FLD: 20 %
NEUTROPHILS # BLD AUTO: 1.35 10*3/MM3 (ref 1.7–7)
NEUTROPHILS # BLD AUTO: 10.07 10*3/MM3 (ref 1.7–7)
NEUTROPHILS # BLD AUTO: 11.9 10*3/MM3 (ref 1.7–7)
NEUTROPHILS # BLD AUTO: 3.69 10*3/MM3 (ref 1.7–7)
NEUTROPHILS # BLD AUTO: 4.11 10*3/MM3 (ref 1.7–7)
NEUTROPHILS # BLD AUTO: 4.74 10*3/MM3 (ref 1.7–7)
NEUTROPHILS # BLD AUTO: 5.3 10*3/MM3 (ref 1.7–7)
NEUTROPHILS # BLD AUTO: 5.4 10*3/MM3 (ref 1.7–7)
NEUTROPHILS # BLD AUTO: 6 10*3/MM3 (ref 1.7–7)
NEUTROPHILS # BLD AUTO: 8.6 10*3/MM3 (ref 1.7–7)
NEUTROPHILS NFR BLD AUTO: 50.4 % (ref 42.7–76)
NEUTROPHILS NFR BLD AUTO: 63.7 % (ref 42.7–76)
NEUTROPHILS NFR BLD AUTO: 65.9 % (ref 42.7–76)
NEUTROPHILS NFR BLD AUTO: 68.7 % (ref 42.7–76)
NEUTROPHILS NFR BLD AUTO: 69.3 % (ref 42.7–76)
NEUTROPHILS NFR BLD AUTO: 75.9 % (ref 42.7–76)
NEUTROPHILS NFR BLD AUTO: 79.6 % (ref 42.7–76)
NEUTROPHILS NFR BLD AUTO: 82.3 % (ref 42.7–76)
NEUTROPHILS NFR BLD AUTO: 85.9 % (ref 42.7–76)
NEUTROPHILS NFR BLD AUTO: 86.7 % (ref 42.7–76)
NEUTROPHILS NFR FLD MANUAL: 39 %
NEUTROPHILS NFR FLD MANUAL: 8 %
NITRITE UR QL STRIP: NEGATIVE
NOTE: ABNORMAL
NOTE: ABNORMAL
NOTIFIED BY: ABNORMAL
NOTIFIED WHO: ABNORMAL
NRBC BLD AUTO-RTO: 0 /100 WBC (ref 0–0.2)
NRBC BLD AUTO-RTO: 0.7 /100 WBC (ref 0–0.2)
NT-PROBNP SERPL-MCNC: 148.8 PG/ML (ref 5–900)
NT-PROBNP SERPL-MCNC: 3078 PG/ML (ref 5–900)
NT-PROBNP SERPL-MCNC: 65.1 PG/ML (ref 5–900)
OPIATES UR QL: NEGATIVE
OPIATES UR QL: NEGATIVE
OXYCODONE UR QL SCN: NEGATIVE
OXYCODONE UR QL SCN: NEGATIVE
OXYHGB MFR BLDV: 87.4 % (ref 94–99)
OXYHGB MFR BLDV: 96.9 % (ref 94–99)
PATH REPORT.ADDENDUM SPEC: NORMAL
PATH REPORT.FINAL DX SPEC: NORMAL
PATH REPORT.GROSS SPEC: NORMAL
PCO2 BLDA: 101.9 MM HG (ref 35–45)
PCO2 BLDA: 108 MM HG (ref 35–45)
PCO2 BLDA: 54.7 MM HG (ref 35–45)
PCO2 BLDA: 61.8 MM HG (ref 35–45)
PCO2 TEMP ADJ BLD: 108 MM HG (ref 35–48)
PCO2 TEMP ADJ BLD: 54.7 MM HG (ref 35–48)
PCP UR QL SCN: NEGATIVE
PCP UR QL SCN: NEGATIVE
PH BLDA: 7.13 PH UNITS (ref 7.35–7.45)
PH BLDA: 7.16 PH UNITS (ref 7.35–7.6)
PH BLDA: 7.28 PH UNITS (ref 7.35–7.6)
PH BLDA: 7.29 PH UNITS (ref 7.35–7.45)
PH FLD: 8.05 [PH]
PH UR STRIP.AUTO: 6 [PH] (ref 5–8)
PH UR STRIP.AUTO: 7 [PH] (ref 5–8)
PH UR STRIP.AUTO: <=5 [PH] (ref 5–8)
PH, TEMP CORRECTED: 7.13 PH UNITS
PH, TEMP CORRECTED: 7.29 PH UNITS
PLATELET # BLD AUTO: 113 10*3/MM3 (ref 140–450)
PLATELET # BLD AUTO: 136 10*3/MM3 (ref 140–450)
PLATELET # BLD AUTO: 167 10*3/MM3 (ref 140–450)
PLATELET # BLD AUTO: 172 10*3/MM3 (ref 140–450)
PLATELET # BLD AUTO: 181 10*3/MM3 (ref 140–450)
PLATELET # BLD AUTO: 184 10*3/MM3 (ref 140–450)
PLATELET # BLD AUTO: 187 10*3/MM3 (ref 140–450)
PLATELET # BLD AUTO: 224 10*3/MM3 (ref 140–450)
PLATELET # BLD AUTO: 234 10*3/MM3 (ref 140–450)
PLATELET # BLD AUTO: 97 10*3/MM3 (ref 140–450)
PMV BLD AUTO: 10.4 FL (ref 6–12)
PMV BLD AUTO: 6 FL (ref 6–12)
PMV BLD AUTO: 6.2 FL (ref 6–12)
PMV BLD AUTO: 6.7 FL (ref 6–12)
PMV BLD AUTO: 6.7 FL (ref 6–12)
PMV BLD AUTO: 7.2 FL (ref 6–12)
PMV BLD AUTO: 9 FL (ref 6–12)
PMV BLD AUTO: 9.5 FL (ref 6–12)
PMV BLD AUTO: 9.6 FL (ref 6–12)
PMV BLD AUTO: 9.8 FL (ref 6–12)
PO2 BLDA: 15 MMHG (ref 80–105)
PO2 BLDA: 164 MM HG (ref 83–108)
PO2 BLDA: 37 MMHG (ref 80–105)
PO2 BLDA: 65.3 MM HG (ref 83–108)
PO2 TEMP ADJ BLD: 164 MM HG (ref 83–108)
PO2 TEMP ADJ BLD: 65.3 MM HG (ref 83–108)
POTASSIUM BLD-SCNC: 3.8 MMOL/L (ref 3.5–5.2)
POTASSIUM BLD-SCNC: 3.8 MMOL/L (ref 3.5–5.2)
POTASSIUM BLD-SCNC: 3.9 MMOL/L (ref 3.5–5.2)
POTASSIUM BLD-SCNC: 3.9 MMOL/L (ref 3.5–5.2)
POTASSIUM BLD-SCNC: 4.2 MMOL/L (ref 3.5–5.2)
POTASSIUM BLD-SCNC: 4.3 MMOL/L (ref 3.5–5.2)
POTASSIUM BLD-SCNC: 4.5 MMOL/L (ref 3.5–5.2)
POTASSIUM BLD-SCNC: 4.7 MMOL/L (ref 3.5–5.2)
POTASSIUM BLD-SCNC: 5.5 MMOL/L (ref 3.5–5.2)
POTASSIUM BLDA-SCNC: 4.6 MMOL/L (ref 3.5–4.9)
POTASSIUM BLDA-SCNC: 5.2 MMOL/L (ref 3.5–4.9)
PROCALCITONIN SERPL-MCNC: 0.11 NG/ML (ref 0.1–0.25)
PROCALCITONIN SERPL-MCNC: 0.28 NG/ML (ref 0.1–0.25)
PROCALCITONIN SERPL-MCNC: 0.41 NG/ML (ref 0.1–0.25)
PROPOXYPH UR QL: NEGATIVE
PROPOXYPH UR QL: NEGATIVE
PROT FLD-MCNC: 2.9 G/DL
PROT FLD-MCNC: 3.3 G/DL
PROT SERPL-MCNC: 5.8 G/DL (ref 6–8.5)
PROT SERPL-MCNC: 5.9 G/DL (ref 6–8.5)
PROT SERPL-MCNC: 6.3 G/DL (ref 6–8.5)
PROT SERPL-MCNC: 6.3 G/DL (ref 6–8.5)
PROT SERPL-MCNC: 6.4 G/DL (ref 6–8.5)
PROT SERPL-MCNC: 6.7 G/DL (ref 6–8.5)
PROT SERPL-MCNC: 6.8 G/DL (ref 6–8.5)
PROT SERPL-MCNC: 6.8 G/DL (ref 6–8.5)
PROT UR QL STRIP: ABNORMAL
PROT UR QL STRIP: ABNORMAL
PROT UR QL STRIP: NEGATIVE
PROTHROMBIN TIME: 13.1 SECONDS (ref 11.5–14)
PROTHROMBIN TIME: 13.5 SECONDS (ref 12.8–15.2)
PROTHROMBIN TIME: 14.3 SECONDS (ref 12.8–15.2)
RBC # BLD AUTO: 4.11 10*6/MM3 (ref 4.14–5.8)
RBC # BLD AUTO: 4.23 10*6/MM3 (ref 4.14–5.8)
RBC # BLD AUTO: 5.02 10*6/MM3 (ref 4.14–5.8)
RBC # BLD AUTO: 5.07 10*6/MM3 (ref 4.14–5.8)
RBC # BLD AUTO: 5.09 10*6/MM3 (ref 4.14–5.8)
RBC # BLD AUTO: 5.23 10*6/MM3 (ref 4.14–5.8)
RBC # BLD AUTO: 5.27 10*6/MM3 (ref 4.14–5.8)
RBC # BLD AUTO: 5.53 10*6/MM3 (ref 4.14–5.8)
RBC # BLD AUTO: 5.57 10*6/MM3 (ref 4.14–5.8)
RBC # BLD AUTO: 5.68 10*6/MM3 (ref 4.14–5.8)
RBC # FLD AUTO: 5000 /MM3
RBC # FLD AUTO: <2000 /MM3
RBC # UR: NORMAL /HPF
REF LAB TEST METHOD: NORMAL
RH BLD: POSITIVE
RH BLD: POSITIVE
RHINOVIRUS RNA SPEC NAA+PROBE: NOT DETECTED
RHINOVIRUS RNA SPEC NAA+PROBE: NOT DETECTED
RSV RNA NPH QL NAA+NON-PROBE: NOT DETECTED
RSV RNA NPH QL NAA+NON-PROBE: NOT DETECTED
SAO2 % BLDA: 16 % (ref 95–98)
SAO2 % BLDA: 51 % (ref 95–98)
SARS-COV-2 RNA RESP QL NAA+PROBE: NOT DETECTED
SODIUM BLD-SCNC: 136 MMOL/L (ref 136–145)
SODIUM BLD-SCNC: 136 MMOL/L (ref 136–145)
SODIUM BLD-SCNC: 137 MMOL/L (ref 136–145)
SODIUM BLD-SCNC: 137 MMOL/L (ref 136–145)
SODIUM BLD-SCNC: 138 MMOL/L (ref 136–145)
SODIUM BLD-SCNC: 139 MMOL/L (ref 136–145)
SODIUM BLD-SCNC: 141 MMOL/L (ref 136–145)
SODIUM BLD-SCNC: 143 MMOL/L (ref 136–145)
SODIUM BLD-SCNC: 145 MMOL/L (ref 136–145)
SODIUM BLDA-SCNC: 134 MMOL/L (ref 138–146)
SODIUM BLDA-SCNC: 141 MMOL/L (ref 138–146)
SP GR UR STRIP: 1.01 (ref 1–1.03)
SP GR UR STRIP: 1.01 (ref 1–1.03)
SP GR UR STRIP: 1.03 (ref 1–1.03)
SQUAMOUS #/AREA URNS HPF: NORMAL /HPF
STRESS TARGET HR: 140 BPM
T&S EXPIRATION DATE: NORMAL
T4 FREE SERPL-MCNC: 1.17 NG/DL (ref 0.93–1.7)
TRICYCLICS UR QL SCN: NEGATIVE
TRICYCLICS UR QL SCN: NEGATIVE
TROPONIN T SERPL-MCNC: <0.01 NG/ML (ref 0–0.03)
TSH SERPL DL<=0.05 MIU/L-ACNC: 4.61 UIU/ML (ref 0.27–4.2)
TSH SERPL DL<=0.05 MIU/L-ACNC: 6.12 UIU/ML (ref 0.27–4.2)
UROBILINOGEN UR QL STRIP: ABNORMAL
UROBILINOGEN UR QL STRIP: ABNORMAL
UROBILINOGEN UR QL STRIP: NORMAL
VENTILATOR MODE: ABNORMAL
VENTILATOR MODE: ABNORMAL
WBC # FLD AUTO: 163 /MM3
WBC # FLD AUTO: 640 /MM3
WBC NRBC COR # BLD: 10 10*3/MM3 (ref 3.4–10.8)
WBC NRBC COR # BLD: 12.24 10*3/MM3 (ref 3.4–10.8)
WBC NRBC COR # BLD: 13.7 10*3/MM3 (ref 3.4–10.8)
WBC NRBC COR # BLD: 2.68 10*3/MM3 (ref 3.4–10.8)
WBC NRBC COR # BLD: 5.79 10*3/MM3 (ref 3.4–10.8)
WBC NRBC COR # BLD: 6.22 10*3/MM3 (ref 3.4–10.8)
WBC NRBC COR # BLD: 6.89 10*3/MM3 (ref 3.4–10.8)
WBC NRBC COR # BLD: 7.1 10*3/MM3 (ref 3.4–10.8)
WBC NRBC COR # BLD: 7.6 10*3/MM3 (ref 3.4–10.8)
WBC NRBC COR # BLD: 7.6 10*3/MM3 (ref 3.4–10.8)
WBC UR QL AUTO: NORMAL /HPF
WHOLE BLOOD HOLD SPECIMEN: NORMAL

## 2020-01-01 PROCEDURE — 25010000002 FUROSEMIDE PER 20 MG: Performed by: HOSPITALIST

## 2020-01-01 PROCEDURE — 71045 X-RAY EXAM CHEST 1 VIEW: CPT

## 2020-01-01 PROCEDURE — 25010000002 MORPHINE PER 10 MG: Performed by: INTERNAL MEDICINE

## 2020-01-01 PROCEDURE — 70553 MRI BRAIN STEM W/O & W/DYE: CPT

## 2020-01-01 PROCEDURE — G0463 HOSPITAL OUTPT CLINIC VISIT: HCPCS

## 2020-01-01 PROCEDURE — 81001 URINALYSIS AUTO W/SCOPE: CPT | Performed by: EMERGENCY MEDICINE

## 2020-01-01 PROCEDURE — 88112 CYTOPATH CELL ENHANCE TECH: CPT | Performed by: INTERNAL MEDICINE

## 2020-01-01 PROCEDURE — 96413 CHEMO IV INFUSION 1 HR: CPT

## 2020-01-01 PROCEDURE — A9577 INJ MULTIHANCE: HCPCS | Performed by: NEUROLOGICAL SURGERY

## 2020-01-01 PROCEDURE — 25010000002 MIDAZOLAM PER 1 MG

## 2020-01-01 PROCEDURE — 70551 MRI BRAIN STEM W/O DYE: CPT

## 2020-01-01 PROCEDURE — 25010000002 VANCOMYCIN 10 G RECONSTITUTED SOLUTION: Performed by: EMERGENCY MEDICINE

## 2020-01-01 PROCEDURE — 99214 OFFICE O/P EST MOD 30 MIN: CPT | Performed by: INTERNAL MEDICINE

## 2020-01-01 PROCEDURE — 25010000002 PEMBROLIZUMAB 100 MG/4ML SOLUTION 4 ML VIAL: Performed by: NURSE PRACTITIONER

## 2020-01-01 PROCEDURE — 87147 CULTURE TYPE IMMUNOLOGIC: CPT | Performed by: EMERGENCY MEDICINE

## 2020-01-01 PROCEDURE — 84443 ASSAY THYROID STIM HORMONE: CPT | Performed by: NURSE PRACTITIONER

## 2020-01-01 PROCEDURE — 86900 BLOOD TYPING SEROLOGIC ABO: CPT

## 2020-01-01 PROCEDURE — 25010000002 HYDROMORPHONE PER 4 MG: Performed by: INTERNAL MEDICINE

## 2020-01-01 PROCEDURE — 87040 BLOOD CULTURE FOR BACTERIA: CPT | Performed by: EMERGENCY MEDICINE

## 2020-01-01 PROCEDURE — 99284 EMERGENCY DEPT VISIT MOD MDM: CPT

## 2020-01-01 PROCEDURE — 77336 RADIATION PHYSICS CONSULT: CPT | Performed by: RADIOLOGY

## 2020-01-01 PROCEDURE — 93005 ELECTROCARDIOGRAM TRACING: CPT

## 2020-01-01 PROCEDURE — 25010000002 DEXAMETHASONE PER 1 MG: Performed by: INTERNAL MEDICINE

## 2020-01-01 PROCEDURE — 25010000002 FENTANYL CITRATE (PF) 100 MCG/2ML SOLUTION: Performed by: RADIOLOGY

## 2020-01-01 PROCEDURE — 94660 CPAP INITIATION&MGMT: CPT

## 2020-01-01 PROCEDURE — 94640 AIRWAY INHALATION TREATMENT: CPT

## 2020-01-01 PROCEDURE — 25010000002 FENTANYL CITRATE (PF) 100 MCG/2ML SOLUTION

## 2020-01-01 PROCEDURE — 99233 SBSQ HOSP IP/OBS HIGH 50: CPT | Performed by: HOSPITALIST

## 2020-01-01 PROCEDURE — 82330 ASSAY OF CALCIUM: CPT

## 2020-01-01 PROCEDURE — 99153 MOD SED SAME PHYS/QHP EA: CPT

## 2020-01-01 PROCEDURE — 25010000002 FOSAPREPITANT PER 1 MG: Performed by: INTERNAL MEDICINE

## 2020-01-01 PROCEDURE — 87015 SPECIMEN INFECT AGNT CONCNTJ: CPT | Performed by: NURSE PRACTITIONER

## 2020-01-01 PROCEDURE — 82805 BLOOD GASES W/O2 SATURATION: CPT

## 2020-01-01 PROCEDURE — 87070 CULTURE OTHR SPECIMN AEROBIC: CPT | Performed by: NURSE PRACTITIONER

## 2020-01-01 PROCEDURE — 85025 COMPLETE CBC W/AUTO DIFF WBC: CPT | Performed by: EMERGENCY MEDICINE

## 2020-01-01 PROCEDURE — 84484 ASSAY OF TROPONIN QUANT: CPT | Performed by: EMERGENCY MEDICINE

## 2020-01-01 PROCEDURE — 82565 ASSAY OF CREATININE: CPT

## 2020-01-01 PROCEDURE — 93000 ELECTROCARDIOGRAM COMPLETE: CPT | Performed by: INTERNAL MEDICINE

## 2020-01-01 PROCEDURE — 71250 CT THORAX DX C-: CPT

## 2020-01-01 PROCEDURE — 93005 ELECTROCARDIOGRAM TRACING: CPT | Performed by: EMERGENCY MEDICINE

## 2020-01-01 PROCEDURE — 99215 OFFICE O/P EST HI 40 MIN: CPT | Performed by: INTERNAL MEDICINE

## 2020-01-01 PROCEDURE — 36600 WITHDRAWAL OF ARTERIAL BLOOD: CPT

## 2020-01-01 PROCEDURE — 85025 COMPLETE CBC W/AUTO DIFF WBC: CPT | Performed by: INTERNAL MEDICINE

## 2020-01-01 PROCEDURE — 77280 THER RAD SIMULAJ FIELD SMPL: CPT | Performed by: RADIOLOGY

## 2020-01-01 PROCEDURE — 25010000002 PIPERACILLIN SOD-TAZOBACTAM PER 1 G: Performed by: EMERGENCY MEDICINE

## 2020-01-01 PROCEDURE — 70450 CT HEAD/BRAIN W/O DYE: CPT

## 2020-01-01 PROCEDURE — 99239 HOSP IP/OBS DSCHRG MGMT >30: CPT | Performed by: FAMILY MEDICINE

## 2020-01-01 PROCEDURE — 82803 BLOOD GASES ANY COMBINATION: CPT

## 2020-01-01 PROCEDURE — 99204 OFFICE O/P NEW MOD 45 MIN: CPT | Performed by: INTERNAL MEDICINE

## 2020-01-01 PROCEDURE — 86140 C-REACTIVE PROTEIN: CPT | Performed by: EMERGENCY MEDICINE

## 2020-01-01 PROCEDURE — 89051 BODY FLUID CELL COUNT: CPT | Performed by: NURSE PRACTITIONER

## 2020-01-01 PROCEDURE — 80306 DRUG TEST PRSMV INSTRMNT: CPT

## 2020-01-01 PROCEDURE — A9577 INJ MULTIHANCE: HCPCS | Performed by: INTERNAL MEDICINE

## 2020-01-01 PROCEDURE — 87804 INFLUENZA ASSAY W/OPTIC: CPT | Performed by: INTERNAL MEDICINE

## 2020-01-01 PROCEDURE — 82947 ASSAY GLUCOSE BLOOD QUANT: CPT

## 2020-01-01 PROCEDURE — 96375 TX/PRO/DX INJ NEW DRUG ADDON: CPT

## 2020-01-01 PROCEDURE — 99232 SBSQ HOSP IP/OBS MODERATE 35: CPT | Performed by: INTERNAL MEDICINE

## 2020-01-01 PROCEDURE — 80053 COMPREHEN METABOLIC PANEL: CPT | Performed by: EMERGENCY MEDICINE

## 2020-01-01 PROCEDURE — 85730 THROMBOPLASTIN TIME PARTIAL: CPT | Performed by: EMERGENCY MEDICINE

## 2020-01-01 PROCEDURE — C1729 CATH, DRAINAGE: HCPCS

## 2020-01-01 PROCEDURE — 99214 OFFICE O/P EST MOD 30 MIN: CPT | Performed by: NURSE PRACTITIONER

## 2020-01-01 PROCEDURE — 94799 UNLISTED PULMONARY SVC/PX: CPT

## 2020-01-01 PROCEDURE — 85610 PROTHROMBIN TIME: CPT

## 2020-01-01 PROCEDURE — 71275 CT ANGIOGRAPHY CHEST: CPT

## 2020-01-01 PROCEDURE — 0100U HC BIOFIRE FILMARRAY RESP PANEL 2: CPT | Performed by: EMERGENCY MEDICINE

## 2020-01-01 PROCEDURE — 77300 RADIATION THERAPY DOSE PLAN: CPT | Performed by: RADIOLOGY

## 2020-01-01 PROCEDURE — 99213 OFFICE O/P EST LOW 20 MIN: CPT | Performed by: INTERNAL MEDICINE

## 2020-01-01 PROCEDURE — 99223 1ST HOSP IP/OBS HIGH 75: CPT | Performed by: INTERNAL MEDICINE

## 2020-01-01 PROCEDURE — 25010000002 MORPHINE PER 10 MG: Performed by: EMERGENCY MEDICINE

## 2020-01-01 PROCEDURE — 25010000002 METHYLPREDNISOLONE PER 125 MG: Performed by: INTERNAL MEDICINE

## 2020-01-01 PROCEDURE — 80053 COMPREHEN METABOLIC PANEL: CPT

## 2020-01-01 PROCEDURE — 88341 IMHCHEM/IMCYTCHM EA ADD ANTB: CPT | Performed by: INTERNAL MEDICINE

## 2020-01-01 PROCEDURE — 87015 SPECIMEN INFECT AGNT CONCNTJ: CPT | Performed by: INTERNAL MEDICINE

## 2020-01-01 PROCEDURE — 75989 ABSCESS DRAINAGE UNDER X-RAY: CPT

## 2020-01-01 PROCEDURE — 25010000002 CARBOPLATIN PER 50 MG: Performed by: INTERNAL MEDICINE

## 2020-01-01 PROCEDURE — 83986 ASSAY PH BODY FLUID NOS: CPT | Performed by: NURSE PRACTITIONER

## 2020-01-01 PROCEDURE — 0 TECHNETIUM MEDRONATE KIT: Performed by: INTERNAL MEDICINE

## 2020-01-01 PROCEDURE — 96417 CHEMO IV INFUS EACH ADDL SEQ: CPT

## 2020-01-01 PROCEDURE — 25010000002 ONDANSETRON PER 1 MG: Performed by: EMERGENCY MEDICINE

## 2020-01-01 PROCEDURE — 85610 PROTHROMBIN TIME: CPT | Performed by: INTERNAL MEDICINE

## 2020-01-01 PROCEDURE — 25010000002 PEMBROLIZUMAB 100 MG/4ML SOLUTION 4 ML VIAL: Performed by: INTERNAL MEDICINE

## 2020-01-01 PROCEDURE — 93306 TTE W/DOPPLER COMPLETE: CPT | Performed by: INTERNAL MEDICINE

## 2020-01-01 PROCEDURE — 83735 ASSAY OF MAGNESIUM: CPT | Performed by: INTERNAL MEDICINE

## 2020-01-01 PROCEDURE — 25010000003 LIDOCAINE 1 % SOLUTION: Performed by: RADIOLOGY

## 2020-01-01 PROCEDURE — 70490 CT SOFT TISSUE NECK W/O DYE: CPT

## 2020-01-01 PROCEDURE — 25010000002 LORAZEPAM PER 2 MG: Performed by: NURSE PRACTITIONER

## 2020-01-01 PROCEDURE — 84145 PROCALCITONIN (PCT): CPT | Performed by: EMERGENCY MEDICINE

## 2020-01-01 PROCEDURE — 96368 THER/DIAG CONCURRENT INF: CPT

## 2020-01-01 PROCEDURE — 25010000002 GEMCITABINE 1 GM/26.3ML SOLUTION 26.3 ML VIAL: Performed by: INTERNAL MEDICINE

## 2020-01-01 PROCEDURE — 84295 ASSAY OF SERUM SODIUM: CPT

## 2020-01-01 PROCEDURE — 99291 CRITICAL CARE FIRST HOUR: CPT | Performed by: INTERNAL MEDICINE

## 2020-01-01 PROCEDURE — 25010000002 HYDROMORPHONE HCL-NACL 30-0.9 MG/30ML-% SOLUTION PREFILLED SYRINGE: Performed by: INTERNAL MEDICINE

## 2020-01-01 PROCEDURE — 80053 COMPREHEN METABOLIC PANEL: CPT | Performed by: INTERNAL MEDICINE

## 2020-01-01 PROCEDURE — 77334 RADIATION TREATMENT AID(S): CPT | Performed by: RADIOLOGY

## 2020-01-01 PROCEDURE — 74176 CT ABD & PELVIS W/O CONTRAST: CPT

## 2020-01-01 PROCEDURE — 86901 BLOOD TYPING SEROLOGIC RH(D): CPT

## 2020-01-01 PROCEDURE — 85025 COMPLETE CBC W/AUTO DIFF WBC: CPT

## 2020-01-01 PROCEDURE — 85379 FIBRIN DEGRADATION QUANT: CPT | Performed by: EMERGENCY MEDICINE

## 2020-01-01 PROCEDURE — 99024 POSTOP FOLLOW-UP VISIT: CPT | Performed by: NEUROLOGICAL SURGERY

## 2020-01-01 PROCEDURE — 78306 BONE IMAGING WHOLE BODY: CPT

## 2020-01-01 PROCEDURE — 87205 SMEAR GRAM STAIN: CPT | Performed by: NURSE PRACTITIONER

## 2020-01-01 PROCEDURE — 0W9B3ZX DRAINAGE OF LEFT PLEURAL CAVITY, PERCUTANEOUS APPROACH, DIAGNOSTIC: ICD-10-PCS | Performed by: INTERNAL MEDICINE

## 2020-01-01 PROCEDURE — 83605 ASSAY OF LACTIC ACID: CPT | Performed by: EMERGENCY MEDICINE

## 2020-01-01 PROCEDURE — 83735 ASSAY OF MAGNESIUM: CPT | Performed by: EMERGENCY MEDICINE

## 2020-01-01 PROCEDURE — 88305 TISSUE EXAM BY PATHOLOGIST: CPT | Performed by: INTERNAL MEDICINE

## 2020-01-01 PROCEDURE — 99239 HOSP IP/OBS DSCHRG MGMT >30: CPT | Performed by: INTERNAL MEDICINE

## 2020-01-01 PROCEDURE — 83880 ASSAY OF NATRIURETIC PEPTIDE: CPT | Performed by: EMERGENCY MEDICINE

## 2020-01-01 PROCEDURE — 81003 URINALYSIS AUTO W/O SCOPE: CPT | Performed by: INTERNAL MEDICINE

## 2020-01-01 PROCEDURE — 36415 COLL VENOUS BLD VENIPUNCTURE: CPT

## 2020-01-01 PROCEDURE — 83615 LACTATE (LD) (LDH) ENZYME: CPT | Performed by: INTERNAL MEDICINE

## 2020-01-01 PROCEDURE — 84132 ASSAY OF SERUM POTASSIUM: CPT

## 2020-01-01 PROCEDURE — 99152 MOD SED SAME PHYS/QHP 5/>YRS: CPT

## 2020-01-01 PROCEDURE — 86901 BLOOD TYPING SEROLOGIC RH(D): CPT | Performed by: EMERGENCY MEDICINE

## 2020-01-01 PROCEDURE — 77295 3-D RADIOTHERAPY PLAN: CPT | Performed by: RADIOLOGY

## 2020-01-01 PROCEDURE — 0 IOPAMIDOL PER 1 ML: Performed by: EMERGENCY MEDICINE

## 2020-01-01 PROCEDURE — 87804 INFLUENZA ASSAY W/OPTIC: CPT

## 2020-01-01 PROCEDURE — 86850 RBC ANTIBODY SCREEN: CPT | Performed by: EMERGENCY MEDICINE

## 2020-01-01 PROCEDURE — 83690 ASSAY OF LIPASE: CPT

## 2020-01-01 PROCEDURE — 84439 ASSAY OF FREE THYROXINE: CPT | Performed by: INTERNAL MEDICINE

## 2020-01-01 PROCEDURE — 87635 SARS-COV-2 COVID-19 AMP PRB: CPT | Performed by: NURSE PRACTITIONER

## 2020-01-01 PROCEDURE — 81003 URINALYSIS AUTO W/O SCOPE: CPT

## 2020-01-01 PROCEDURE — 82140 ASSAY OF AMMONIA: CPT | Performed by: EMERGENCY MEDICINE

## 2020-01-01 PROCEDURE — 85014 HEMATOCRIT: CPT

## 2020-01-01 PROCEDURE — 80048 BASIC METABOLIC PNL TOTAL CA: CPT | Performed by: INTERNAL MEDICINE

## 2020-01-01 PROCEDURE — 87070 CULTURE OTHR SPECIMN AEROBIC: CPT | Performed by: INTERNAL MEDICINE

## 2020-01-01 PROCEDURE — 0 GADOBENATE DIMEGLUMINE 529 MG/ML SOLUTION: Performed by: INTERNAL MEDICINE

## 2020-01-01 PROCEDURE — 84443 ASSAY THYROID STIM HORMONE: CPT

## 2020-01-01 PROCEDURE — 72156 MRI NECK SPINE W/O & W/DYE: CPT

## 2020-01-01 PROCEDURE — 96367 TX/PROPH/DG ADDL SEQ IV INF: CPT

## 2020-01-01 PROCEDURE — 25010000002 MIDAZOLAM PER 1 MG: Performed by: RADIOLOGY

## 2020-01-01 PROCEDURE — 82150 ASSAY OF AMYLASE: CPT

## 2020-01-01 PROCEDURE — 99253 IP/OBS CNSLTJ NEW/EST LOW 45: CPT | Performed by: NURSE PRACTITIONER

## 2020-01-01 PROCEDURE — 93306 TTE W/DOPPLER COMPLETE: CPT

## 2020-01-01 PROCEDURE — A9503 TC99M MEDRONATE: HCPCS | Performed by: INTERNAL MEDICINE

## 2020-01-01 PROCEDURE — 99233 SBSQ HOSP IP/OBS HIGH 50: CPT | Performed by: INTERNAL MEDICINE

## 2020-01-01 PROCEDURE — 99215 OFFICE O/P EST HI 40 MIN: CPT | Performed by: NURSE PRACTITIONER

## 2020-01-01 PROCEDURE — 86900 BLOOD TYPING SEROLOGIC ABO: CPT | Performed by: EMERGENCY MEDICINE

## 2020-01-01 PROCEDURE — 0W993ZX DRAINAGE OF RIGHT PLEURAL CAVITY, PERCUTANEOUS APPROACH, DIAGNOSTIC: ICD-10-PCS | Performed by: INTERNAL MEDICINE

## 2020-01-01 PROCEDURE — 84157 ASSAY OF PROTEIN OTHER: CPT | Performed by: NURSE PRACTITIONER

## 2020-01-01 PROCEDURE — 25010000002 LORAZEPAM PER 2 MG: Performed by: INTERNAL MEDICINE

## 2020-01-01 PROCEDURE — 0WHB33Z INSERTION OF INFUSION DEVICE INTO LEFT PLEURAL CAVITY, PERCUTANEOUS APPROACH: ICD-10-PCS | Performed by: INTERNAL MEDICINE

## 2020-01-01 PROCEDURE — 85730 THROMBOPLASTIN TIME PARTIAL: CPT | Performed by: INTERNAL MEDICINE

## 2020-01-01 PROCEDURE — 89051 BODY FLUID CELL COUNT: CPT | Performed by: INTERNAL MEDICINE

## 2020-01-01 PROCEDURE — 87150 DNA/RNA AMPLIFIED PROBE: CPT | Performed by: EMERGENCY MEDICINE

## 2020-01-01 PROCEDURE — 25010000002 PALONOSETRON 0.25 MG/5ML SOLUTION PREFILLED SYRINGE: Performed by: INTERNAL MEDICINE

## 2020-01-01 PROCEDURE — 87220 TISSUE EXAM FOR FUNGI: CPT | Performed by: NURSE PRACTITIONER

## 2020-01-01 PROCEDURE — 83615 LACTATE (LD) (LDH) ENZYME: CPT | Performed by: NURSE PRACTITIONER

## 2020-01-01 PROCEDURE — 82945 GLUCOSE OTHER FLUID: CPT | Performed by: NURSE PRACTITIONER

## 2020-01-01 PROCEDURE — 25010000002 FUROSEMIDE PER 20 MG: Performed by: EMERGENCY MEDICINE

## 2020-01-01 PROCEDURE — 99285 EMERGENCY DEPT VISIT HI MDM: CPT

## 2020-01-01 PROCEDURE — 84157 ASSAY OF PROTEIN OTHER: CPT | Performed by: INTERNAL MEDICINE

## 2020-01-01 PROCEDURE — 25010000002 FUROSEMIDE PER 20 MG: Performed by: NURSE PRACTITIONER

## 2020-01-01 PROCEDURE — 87205 SMEAR GRAM STAIN: CPT | Performed by: INTERNAL MEDICINE

## 2020-01-01 PROCEDURE — 83615 LACTATE (LD) (LDH) ENZYME: CPT | Performed by: EMERGENCY MEDICINE

## 2020-01-01 PROCEDURE — 0 GADOBENATE DIMEGLUMINE 529 MG/ML SOLUTION: Performed by: NEUROLOGICAL SURGERY

## 2020-01-01 PROCEDURE — 77373 STRTCTC BDY RAD THER TX DLVR: CPT | Performed by: RADIOLOGY

## 2020-01-01 RX ORDER — MAGNESIUM SULFATE HEPTAHYDRATE 40 MG/ML
4 INJECTION, SOLUTION INTRAVENOUS AS NEEDED
Status: DISCONTINUED | OUTPATIENT
Start: 2020-01-01 | End: 2020-01-01 | Stop reason: HOSPADM

## 2020-01-01 RX ORDER — ALBUTEROL SULFATE 2.5 MG/3ML
2.5 SOLUTION RESPIRATORY (INHALATION) ONCE
Status: COMPLETED | OUTPATIENT
Start: 2020-01-01 | End: 2020-01-01

## 2020-01-01 RX ORDER — LORAZEPAM 2 MG/ML
0.5 INJECTION INTRAMUSCULAR EVERY 4 HOURS PRN
Status: DISCONTINUED | OUTPATIENT
Start: 2020-01-01 | End: 2020-01-01 | Stop reason: HOSPADM

## 2020-01-01 RX ORDER — IPRATROPIUM BROMIDE AND ALBUTEROL SULFATE 2.5; .5 MG/3ML; MG/3ML
3 SOLUTION RESPIRATORY (INHALATION)
Status: DISCONTINUED | OUTPATIENT
Start: 2020-01-01 | End: 2020-01-01 | Stop reason: HOSPADM

## 2020-01-01 RX ORDER — CODEINE PHOSPHATE AND GUAIFENESIN 10; 100 MG/5ML; MG/5ML
10 SOLUTION ORAL 3 TIMES DAILY PRN
Status: DISCONTINUED | OUTPATIENT
Start: 2020-01-01 | End: 2020-01-01 | Stop reason: HOSPADM

## 2020-01-01 RX ORDER — FUROSEMIDE 10 MG/ML
40 INJECTION INTRAMUSCULAR; INTRAVENOUS ONCE
Status: COMPLETED | OUTPATIENT
Start: 2020-01-01 | End: 2020-01-01

## 2020-01-01 RX ORDER — SODIUM CHLORIDE 9 MG/ML
250 INJECTION, SOLUTION INTRAVENOUS ONCE
Status: COMPLETED | OUTPATIENT
Start: 2020-01-01 | End: 2020-01-01

## 2020-01-01 RX ORDER — HYDROCHLOROTHIAZIDE 25 MG/1
25 TABLET ORAL DAILY
Status: DISCONTINUED | OUTPATIENT
Start: 2020-01-01 | End: 2020-01-01

## 2020-01-01 RX ORDER — ONDANSETRON 2 MG/ML
4 INJECTION INTRAMUSCULAR; INTRAVENOUS ONCE
Status: COMPLETED | OUTPATIENT
Start: 2020-01-01 | End: 2020-01-01

## 2020-01-01 RX ORDER — DEXAMETHASONE 4 MG/1
4 TABLET ORAL 2 TIMES DAILY WITH MEALS
Qty: 4 TABLET | Refills: 0 | Status: SHIPPED | OUTPATIENT
Start: 2020-01-01 | End: 2020-01-01

## 2020-01-01 RX ORDER — DOXYCYCLINE 100 MG/1
100 CAPSULE ORAL EVERY 12 HOURS SCHEDULED
Qty: 6 CAPSULE | Refills: 0 | Status: SHIPPED | OUTPATIENT
Start: 2020-01-01 | End: 2020-01-01

## 2020-01-01 RX ORDER — LORAZEPAM 2 MG/ML
0.5 INJECTION INTRAMUSCULAR EVERY 4 HOURS PRN
Status: CANCELLED | OUTPATIENT
Start: 2020-01-01 | End: 2020-05-29

## 2020-01-01 RX ORDER — TRAMADOL HYDROCHLORIDE 50 MG/1
50 TABLET ORAL EVERY 6 HOURS PRN
Status: DISCONTINUED | OUTPATIENT
Start: 2020-01-01 | End: 2020-01-01 | Stop reason: HOSPADM

## 2020-01-01 RX ORDER — SODIUM CHLORIDE 0.9 % (FLUSH) 0.9 %
10 SYRINGE (ML) INJECTION AS NEEDED
Status: DISCONTINUED | OUTPATIENT
Start: 2020-01-01 | End: 2020-01-01 | Stop reason: HOSPADM

## 2020-01-01 RX ORDER — DEXAMETHASONE 4 MG/1
TABLET ORAL
Qty: 6 TABLET | Refills: 5 | Status: CANCELLED | OUTPATIENT
Start: 2020-01-01

## 2020-01-01 RX ORDER — PROMETHAZINE HYDROCHLORIDE 12.5 MG/1
12.5 TABLET ORAL EVERY 6 HOURS PRN
Status: DISCONTINUED | OUTPATIENT
Start: 2020-01-01 | End: 2020-01-01 | Stop reason: HOSPADM

## 2020-01-01 RX ORDER — TC 99M MEDRONATE 20 MG/10ML
25.9 INJECTION, POWDER, LYOPHILIZED, FOR SOLUTION INTRAVENOUS
Status: COMPLETED | OUTPATIENT
Start: 2020-01-01 | End: 2020-01-01

## 2020-01-01 RX ORDER — ACETAMINOPHEN 325 MG/1
650 TABLET ORAL EVERY 4 HOURS PRN
Status: DISCONTINUED | OUTPATIENT
Start: 2020-01-01 | End: 2020-01-01 | Stop reason: HOSPADM

## 2020-01-01 RX ORDER — RIVAROXABAN 15 MG/1
2 TABLET, FILM COATED ORAL 2 TIMES DAILY
Status: ON HOLD | COMMUNITY
Start: 2020-01-01 | End: 2020-01-01

## 2020-01-01 RX ORDER — SODIUM CHLORIDE 9 MG/ML
250 INJECTION, SOLUTION INTRAVENOUS ONCE
Status: CANCELLED | OUTPATIENT
Start: 2020-01-01

## 2020-01-01 RX ORDER — PANTOPRAZOLE SODIUM 40 MG/1
40 TABLET, DELAYED RELEASE ORAL DAILY
Status: DISCONTINUED | OUTPATIENT
Start: 2020-01-01 | End: 2020-01-01 | Stop reason: HOSPADM

## 2020-01-01 RX ORDER — METHYLPREDNISOLONE SODIUM SUCCINATE 125 MG/2ML
125 INJECTION, POWDER, LYOPHILIZED, FOR SOLUTION INTRAMUSCULAR; INTRAVENOUS EVERY 12 HOURS
Status: DISCONTINUED | OUTPATIENT
Start: 2020-01-01 | End: 2020-01-01 | Stop reason: HOSPADM

## 2020-01-01 RX ORDER — SENNA AND DOCUSATE SODIUM 50; 8.6 MG/1; MG/1
2 TABLET, FILM COATED ORAL DAILY
Qty: 60 TABLET | Refills: 3
Start: 2020-01-01 | End: 2020-01-01

## 2020-01-01 RX ORDER — RIVAROXABAN 20 MG/1
1 TABLET, FILM COATED ORAL DAILY
COMMUNITY
Start: 2020-01-01

## 2020-01-01 RX ORDER — PROMETHAZINE HYDROCHLORIDE 25 MG/ML
12.5 INJECTION, SOLUTION INTRAMUSCULAR; INTRAVENOUS EVERY 6 HOURS PRN
Status: DISCONTINUED | OUTPATIENT
Start: 2020-01-01 | End: 2020-01-01 | Stop reason: HOSPADM

## 2020-01-01 RX ORDER — FENTANYL CITRATE 50 UG/ML
INJECTION, SOLUTION INTRAMUSCULAR; INTRAVENOUS
Status: COMPLETED
Start: 2020-01-01 | End: 2020-01-01

## 2020-01-01 RX ORDER — FAMOTIDINE 10 MG/ML
20 INJECTION, SOLUTION INTRAVENOUS AS NEEDED
Status: CANCELLED | OUTPATIENT
Start: 2020-01-01

## 2020-01-01 RX ORDER — HYDROMORPHONE HYDROCHLORIDE 1 MG/ML
0.5 INJECTION, SOLUTION INTRAMUSCULAR; INTRAVENOUS; SUBCUTANEOUS
Status: DISCONTINUED | OUTPATIENT
Start: 2020-01-01 | End: 2020-01-01 | Stop reason: HOSPADM

## 2020-01-01 RX ORDER — ACETAMINOPHEN 650 MG/1
650 SUPPOSITORY RECTAL EVERY 4 HOURS PRN
Status: DISCONTINUED | OUTPATIENT
Start: 2020-01-01 | End: 2020-01-01 | Stop reason: HOSPADM

## 2020-01-01 RX ORDER — METHYLPREDNISOLONE 4 MG/1
TABLET ORAL
Qty: 21 EACH | Refills: 0 | Status: SHIPPED | OUTPATIENT
Start: 2020-01-01 | End: 2020-01-01

## 2020-01-01 RX ORDER — TRAMADOL HYDROCHLORIDE 50 MG/1
TABLET ORAL
COMMUNITY
Start: 2020-01-01 | End: 2020-01-01 | Stop reason: SDUPTHER

## 2020-01-01 RX ORDER — LIDOCAINE HYDROCHLORIDE 10 MG/ML
INJECTION, SOLUTION EPIDURAL; INFILTRATION; INTRACAUDAL; PERINEURAL
Status: DISCONTINUED
Start: 2020-01-01 | End: 2020-01-01 | Stop reason: HOSPADM

## 2020-01-01 RX ORDER — ACETAMINOPHEN 160 MG/5ML
650 SOLUTION ORAL EVERY 4 HOURS PRN
Status: DISCONTINUED | OUTPATIENT
Start: 2020-01-01 | End: 2020-01-01 | Stop reason: HOSPADM

## 2020-01-01 RX ORDER — SODIUM CHLORIDE 0.9 % (FLUSH) 0.9 %
10 SYRINGE (ML) INJECTION EVERY 12 HOURS SCHEDULED
Status: DISCONTINUED | OUTPATIENT
Start: 2020-01-01 | End: 2020-01-01 | Stop reason: HOSPADM

## 2020-01-01 RX ORDER — LOSARTAN POTASSIUM 50 MG/1
50 TABLET ORAL DAILY
Status: DISCONTINUED | OUTPATIENT
Start: 2020-01-01 | End: 2020-01-01 | Stop reason: HOSPADM

## 2020-01-01 RX ORDER — LORAZEPAM 2 MG/ML
0.5 INJECTION INTRAMUSCULAR
Status: DISCONTINUED | OUTPATIENT
Start: 2020-01-01 | End: 2020-01-01 | Stop reason: HOSPADM

## 2020-01-01 RX ORDER — ONDANSETRON 2 MG/ML
4 INJECTION INTRAMUSCULAR; INTRAVENOUS EVERY 6 HOURS PRN
Status: DISCONTINUED | OUTPATIENT
Start: 2020-01-01 | End: 2020-01-01 | Stop reason: HOSPADM

## 2020-01-01 RX ORDER — BENZONATATE 100 MG/1
200 CAPSULE ORAL 3 TIMES DAILY PRN
Status: DISCONTINUED | OUTPATIENT
Start: 2020-01-01 | End: 2020-01-01 | Stop reason: HOSPADM

## 2020-01-01 RX ORDER — LOSARTAN POTASSIUM 100 MG/1
100 TABLET ORAL DAILY
Status: SHIPPED | COMMUNITY
Start: 2020-01-01

## 2020-01-01 RX ORDER — BISACODYL 10 MG
10 SUPPOSITORY, RECTAL RECTAL DAILY PRN
Status: DISCONTINUED | OUTPATIENT
Start: 2020-01-01 | End: 2020-01-01 | Stop reason: HOSPADM

## 2020-01-01 RX ORDER — NALOXONE HCL 0.4 MG/ML
0.1 VIAL (ML) INJECTION
Status: DISCONTINUED | OUTPATIENT
Start: 2020-01-01 | End: 2020-01-01

## 2020-01-01 RX ORDER — DEXAMETHASONE 4 MG/1
4 TABLET ORAL 2 TIMES DAILY WITH MEALS
Qty: 20 TABLET | Refills: 0 | Status: SHIPPED | OUTPATIENT
Start: 2020-01-01 | End: 2020-01-01 | Stop reason: SDUPTHER

## 2020-01-01 RX ORDER — HYDROCHLOROTHIAZIDE 25 MG/1
25 TABLET ORAL DAILY
Status: ON HOLD | COMMUNITY
End: 2020-01-01

## 2020-01-01 RX ORDER — ACETAMINOPHEN 325 MG/1
650 TABLET ORAL 3 TIMES DAILY
Status: DISCONTINUED | OUTPATIENT
Start: 2020-01-01 | End: 2020-01-01 | Stop reason: HOSPADM

## 2020-01-01 RX ORDER — HYDROMORPHONE HYDROCHLORIDE 1 MG/ML
0.5 INJECTION, SOLUTION INTRAMUSCULAR; INTRAVENOUS; SUBCUTANEOUS ONCE
Status: COMPLETED | OUTPATIENT
Start: 2020-01-01 | End: 2020-01-01

## 2020-01-01 RX ORDER — DOXYCYCLINE 100 MG/1
100 CAPSULE ORAL EVERY 12 HOURS SCHEDULED
Status: DISCONTINUED | OUTPATIENT
Start: 2020-01-01 | End: 2020-01-01 | Stop reason: HOSPADM

## 2020-01-01 RX ORDER — ALBUTEROL SULFATE 90 UG/1
6 AEROSOL, METERED RESPIRATORY (INHALATION) ONCE
Status: DISCONTINUED | OUTPATIENT
Start: 2020-01-01 | End: 2020-01-01 | Stop reason: HOSPADM

## 2020-01-01 RX ORDER — GABAPENTIN 100 MG/1
100 CAPSULE ORAL 4 TIMES DAILY
Qty: 120 CAPSULE | Refills: 0
Start: 2020-01-01 | End: 2020-01-01

## 2020-01-01 RX ORDER — FUROSEMIDE 10 MG/ML
40 INJECTION INTRAMUSCULAR; INTRAVENOUS ONCE AS NEEDED
Status: COMPLETED | OUTPATIENT
Start: 2020-01-01 | End: 2020-01-01

## 2020-01-01 RX ORDER — MORPHINE SULFATE 1 MG/ML
2 INJECTION INTRAVENOUS CONTINUOUS
Status: DISCONTINUED | OUTPATIENT
Start: 2020-01-01 | End: 2020-01-01

## 2020-01-01 RX ORDER — LOPERAMIDE HYDROCHLORIDE 2 MG/1
2 TABLET ORAL 4 TIMES DAILY PRN
Start: 2020-01-01 | End: 2020-01-01

## 2020-01-01 RX ORDER — LOSARTAN POTASSIUM 50 MG/1
100 TABLET ORAL DAILY
Status: DISCONTINUED | OUTPATIENT
Start: 2020-01-01 | End: 2020-01-01 | Stop reason: HOSPADM

## 2020-01-01 RX ORDER — MORPHINE SULFATE 2 MG/ML
2 INJECTION, SOLUTION INTRAMUSCULAR; INTRAVENOUS
Status: DISCONTINUED | OUTPATIENT
Start: 2020-01-01 | End: 2020-01-01 | Stop reason: HOSPADM

## 2020-01-01 RX ORDER — ONDANSETRON 2 MG/ML
4 INJECTION INTRAMUSCULAR; INTRAVENOUS EVERY 6 HOURS PRN
Status: CANCELLED | OUTPATIENT
Start: 2020-01-01

## 2020-01-01 RX ORDER — LIDOCAINE HYDROCHLORIDE 10 MG/ML
20 INJECTION, SOLUTION INFILTRATION; PERINEURAL ONCE
Status: COMPLETED | OUTPATIENT
Start: 2020-01-01 | End: 2020-01-01

## 2020-01-01 RX ORDER — FUROSEMIDE 10 MG/ML
20 INJECTION INTRAMUSCULAR; INTRAVENOUS EVERY 6 HOURS PRN
Status: DISCONTINUED | OUTPATIENT
Start: 2020-01-01 | End: 2020-01-01 | Stop reason: HOSPADM

## 2020-01-01 RX ORDER — METHYLPREDNISOLONE SODIUM SUCCINATE 125 MG/2ML
125 INJECTION, POWDER, LYOPHILIZED, FOR SOLUTION INTRAMUSCULAR; INTRAVENOUS EVERY 12 HOURS
Status: CANCELLED | OUTPATIENT
Start: 2020-01-01

## 2020-01-01 RX ORDER — DIPHENHYDRAMINE HYDROCHLORIDE 50 MG/ML
50 INJECTION INTRAMUSCULAR; INTRAVENOUS AS NEEDED
Status: CANCELLED | OUTPATIENT
Start: 2020-01-01

## 2020-01-01 RX ORDER — ONDANSETRON HYDROCHLORIDE 8 MG/1
8 TABLET, FILM COATED ORAL 3 TIMES DAILY PRN
Qty: 30 TABLET | Refills: 5 | Status: SHIPPED | OUTPATIENT
Start: 2020-01-01

## 2020-01-01 RX ORDER — TRAMADOL HYDROCHLORIDE 50 MG/1
100 TABLET ORAL 3 TIMES DAILY
Qty: 180 TABLET | Refills: 0 | OUTPATIENT
Start: 2020-01-01 | End: 2020-01-01

## 2020-01-01 RX ORDER — FUROSEMIDE 10 MG/ML
20 INJECTION INTRAMUSCULAR; INTRAVENOUS ONCE
Status: COMPLETED | OUTPATIENT
Start: 2020-01-01 | End: 2020-01-01

## 2020-01-01 RX ORDER — LORAZEPAM 2 MG/ML
0.5 INJECTION INTRAMUSCULAR EVERY 6 HOURS
Status: DISCONTINUED | OUTPATIENT
Start: 2020-01-01 | End: 2020-01-01 | Stop reason: HOSPADM

## 2020-01-01 RX ORDER — OXYCODONE HYDROCHLORIDE 5 MG/1
5 TABLET ORAL EVERY 4 HOURS PRN
Status: DISCONTINUED | OUTPATIENT
Start: 2020-01-01 | End: 2020-01-01 | Stop reason: HOSPADM

## 2020-01-01 RX ORDER — BENZONATATE 100 MG/1
200 CAPSULE ORAL 3 TIMES DAILY PRN
Qty: 90 CAPSULE | Refills: 0 | Status: SHIPPED | OUTPATIENT
Start: 2020-01-01 | End: 2020-01-01

## 2020-01-01 RX ORDER — DEXAMETHASONE 4 MG/1
TABLET ORAL
Qty: 6 TABLET | Refills: 5 | Status: SHIPPED | OUTPATIENT
Start: 2020-01-01 | End: 2020-01-01

## 2020-01-01 RX ORDER — ALBUTEROL SULFATE 90 UG/1
2 AEROSOL, METERED RESPIRATORY (INHALATION) EVERY 4 HOURS PRN
Qty: 1 INHALER | Refills: 0 | Status: ON HOLD | OUTPATIENT
Start: 2020-01-01 | End: 2020-01-01

## 2020-01-01 RX ORDER — PANTOPRAZOLE SODIUM 40 MG/1
40 TABLET, DELAYED RELEASE ORAL DAILY
Status: CANCELLED | OUTPATIENT
Start: 2020-01-01

## 2020-01-01 RX ORDER — SODIUM CHLORIDE 0.9 % (FLUSH) 0.9 %
10 SYRINGE (ML) INJECTION AS NEEDED
Status: CANCELLED | OUTPATIENT
Start: 2020-01-01

## 2020-01-01 RX ORDER — PALONOSETRON 0.05 MG/ML
0.25 INJECTION, SOLUTION INTRAVENOUS ONCE
Status: COMPLETED | OUTPATIENT
Start: 2020-01-01 | End: 2020-01-01

## 2020-01-01 RX ORDER — GLYCOPYRROLATE 0.2 MG/ML
0.4 INJECTION INTRAMUSCULAR; INTRAVENOUS EVERY 4 HOURS PRN
Status: DISCONTINUED | OUTPATIENT
Start: 2020-01-01 | End: 2020-01-01 | Stop reason: HOSPADM

## 2020-01-01 RX ORDER — PALONOSETRON 0.05 MG/ML
0.25 INJECTION, SOLUTION INTRAVENOUS ONCE
Status: CANCELLED | OUTPATIENT
Start: 2020-01-01

## 2020-01-01 RX ORDER — OXYCODONE HYDROCHLORIDE 5 MG/1
5 TABLET ORAL EVERY 4 HOURS PRN
Qty: 18 TABLET | Refills: 0 | Status: SHIPPED | OUTPATIENT
Start: 2020-01-01 | End: 2020-01-01

## 2020-01-01 RX ORDER — PROMETHAZINE HYDROCHLORIDE 12.5 MG/1
12.5 TABLET ORAL EVERY 6 HOURS PRN
Qty: 60 TABLET | Refills: 0 | Status: SHIPPED | OUTPATIENT
Start: 2020-01-01 | End: 2020-01-01

## 2020-01-01 RX ORDER — METOPROLOL SUCCINATE 50 MG/1
50 TABLET, EXTENDED RELEASE ORAL DAILY
Status: CANCELLED | OUTPATIENT
Start: 2020-01-01

## 2020-01-01 RX ORDER — METHYLPREDNISOLONE 4 MG/1
TABLET ORAL
Qty: 21 EACH | Refills: 0 | Status: SHIPPED | OUTPATIENT
Start: 2020-01-01 | End: 2020-01-01 | Stop reason: SDUPTHER

## 2020-01-01 RX ORDER — METOPROLOL SUCCINATE 100 MG/1
100 TABLET, EXTENDED RELEASE ORAL DAILY
Status: DISCONTINUED | OUTPATIENT
Start: 2020-01-01 | End: 2020-01-01 | Stop reason: HOSPADM

## 2020-01-01 RX ORDER — METOPROLOL SUCCINATE 50 MG/1
50 TABLET, EXTENDED RELEASE ORAL DAILY
Status: ON HOLD | COMMUNITY
Start: 2020-01-01 | End: 2020-01-01

## 2020-01-01 RX ORDER — METOPROLOL SUCCINATE 50 MG/1
50 TABLET, EXTENDED RELEASE ORAL DAILY
Status: DISCONTINUED | OUTPATIENT
Start: 2020-01-01 | End: 2020-01-01

## 2020-01-01 RX ORDER — FUROSEMIDE 10 MG/ML
40 INJECTION INTRAMUSCULAR; INTRAVENOUS ONCE
Status: DISCONTINUED | OUTPATIENT
Start: 2020-01-01 | End: 2020-01-01 | Stop reason: HOSPADM

## 2020-01-01 RX ORDER — METHYLPREDNISOLONE SODIUM SUCCINATE 125 MG/2ML
60 INJECTION, POWDER, LYOPHILIZED, FOR SOLUTION INTRAMUSCULAR; INTRAVENOUS DAILY
Status: DISCONTINUED | OUTPATIENT
Start: 2020-01-01 | End: 2020-01-01 | Stop reason: HOSPADM

## 2020-01-01 RX ORDER — ONDANSETRON HYDROCHLORIDE 8 MG/1
8 TABLET, FILM COATED ORAL 3 TIMES DAILY PRN
Qty: 30 TABLET | Refills: 5 | Status: CANCELLED | OUTPATIENT
Start: 2020-01-01

## 2020-01-01 RX ORDER — LORAZEPAM 2 MG/ML
1 INJECTION INTRAMUSCULAR
Status: DISCONTINUED | OUTPATIENT
Start: 2020-01-01 | End: 2020-01-01 | Stop reason: HOSPADM

## 2020-01-01 RX ORDER — IPRATROPIUM BROMIDE AND ALBUTEROL SULFATE 2.5; .5 MG/3ML; MG/3ML
3 SOLUTION RESPIRATORY (INHALATION)
Status: DISCONTINUED | OUTPATIENT
Start: 2020-01-01 | End: 2020-01-01

## 2020-01-01 RX ORDER — GUAIFENESIN AND CODEINE PHOSPHATE 100; 10 MG/5ML; MG/5ML
10 SOLUTION ORAL 3 TIMES DAILY PRN
Status: CANCELLED | OUTPATIENT
Start: 2020-01-01

## 2020-01-01 RX ORDER — TRAMADOL HYDROCHLORIDE 50 MG/1
50 TABLET ORAL EVERY 6 HOURS PRN
COMMUNITY

## 2020-01-01 RX ORDER — PANTOPRAZOLE SODIUM 40 MG/1
40 TABLET, DELAYED RELEASE ORAL
Status: DISCONTINUED | OUTPATIENT
Start: 2020-01-01 | End: 2020-01-01

## 2020-01-01 RX ORDER — AMOXICILLIN 250 MG
2 CAPSULE ORAL 2 TIMES DAILY PRN
Status: DISCONTINUED | OUTPATIENT
Start: 2020-01-01 | End: 2020-01-01 | Stop reason: HOSPADM

## 2020-01-01 RX ORDER — ONDANSETRON 4 MG/1
8 TABLET, FILM COATED ORAL 3 TIMES DAILY PRN
Status: DISCONTINUED | OUTPATIENT
Start: 2020-01-01 | End: 2020-01-01 | Stop reason: HOSPADM

## 2020-01-01 RX ORDER — TRAMADOL HYDROCHLORIDE 50 MG/1
50 TABLET ORAL EVERY 6 HOURS PRN
Status: CANCELLED | OUTPATIENT
Start: 2020-01-01

## 2020-01-01 RX ORDER — ONDANSETRON HYDROCHLORIDE 8 MG/1
8 TABLET, FILM COATED ORAL 3 TIMES DAILY PRN
Qty: 30 TABLET | Refills: 5 | Status: SHIPPED | OUTPATIENT
Start: 2020-01-01 | End: 2020-01-01

## 2020-01-01 RX ORDER — GUAIFENESIN AND CODEINE PHOSPHATE 100; 10 MG/5ML; MG/5ML
10 SOLUTION ORAL 3 TIMES DAILY PRN
Status: DISCONTINUED | OUTPATIENT
Start: 2020-01-01 | End: 2020-01-01 | Stop reason: HOSPADM

## 2020-01-01 RX ORDER — MAGNESIUM SULFATE 1 G/100ML
1 INJECTION INTRAVENOUS AS NEEDED
Status: DISCONTINUED | OUTPATIENT
Start: 2020-01-01 | End: 2020-01-01 | Stop reason: HOSPADM

## 2020-01-01 RX ORDER — ALBUTEROL SULFATE 90 UG/1
2 AEROSOL, METERED RESPIRATORY (INHALATION) EVERY 4 HOURS PRN
Status: DISCONTINUED | OUTPATIENT
Start: 2020-01-01 | End: 2020-01-01 | Stop reason: HOSPADM

## 2020-01-01 RX ORDER — BENZONATATE 100 MG/1
200 CAPSULE ORAL 3 TIMES DAILY PRN
Status: DISCONTINUED | OUTPATIENT
Start: 2020-01-01 | End: 2020-01-01

## 2020-01-01 RX ORDER — FENTANYL CITRATE 50 UG/ML
INJECTION, SOLUTION INTRAMUSCULAR; INTRAVENOUS
Status: COMPLETED | OUTPATIENT
Start: 2020-01-01 | End: 2020-01-01

## 2020-01-01 RX ORDER — DOXAZOSIN MESYLATE 4 MG/1
2 TABLET ORAL NIGHTLY
COMMUNITY
Start: 2020-01-01 | End: 2020-01-01 | Stop reason: DRUGHIGH

## 2020-01-01 RX ORDER — ONDANSETRON 8 MG/1
8 TABLET, ORALLY DISINTEGRATING ORAL EVERY 8 HOURS PRN
COMMUNITY
End: 2020-01-01

## 2020-01-01 RX ORDER — IPRATROPIUM BROMIDE AND ALBUTEROL SULFATE 2.5; .5 MG/3ML; MG/3ML
3 SOLUTION RESPIRATORY (INHALATION) EVERY 4 HOURS PRN
Status: DISCONTINUED | OUTPATIENT
Start: 2020-01-01 | End: 2020-01-01 | Stop reason: HOSPADM

## 2020-01-01 RX ORDER — SODIUM CHLORIDE 0.9 % (FLUSH) 0.9 %
10 SYRINGE (ML) INJECTION EVERY 12 HOURS SCHEDULED
Status: CANCELLED | OUTPATIENT
Start: 2020-01-01

## 2020-01-01 RX ORDER — OXYCODONE AND ACETAMINOPHEN 7.5; 325 MG/1; MG/1
TABLET ORAL
Qty: 10 TABLET | Refills: 0
Start: 2020-01-01 | End: 2020-01-01

## 2020-01-01 RX ORDER — FUROSEMIDE 20 MG/1
20 TABLET ORAL DAILY PRN
Qty: 10 TABLET | Refills: 0 | Status: ON HOLD | OUTPATIENT
Start: 2020-01-01 | End: 2020-01-01 | Stop reason: SDDI

## 2020-01-01 RX ORDER — TERAZOSIN 5 MG/1
5 CAPSULE ORAL EVERY 12 HOURS SCHEDULED
Status: DISCONTINUED | OUTPATIENT
Start: 2020-01-01 | End: 2020-01-01 | Stop reason: HOSPADM

## 2020-01-01 RX ORDER — GUAIFENESIN/DEXTROMETHORPHAN 100-10MG/5
5 SYRUP ORAL EVERY 6 HOURS PRN
Qty: 118 ML | Refills: 0 | Status: SHIPPED | OUTPATIENT
Start: 2020-01-01 | End: 2020-01-01

## 2020-01-01 RX ORDER — PREDNISONE 10 MG/1
10 TABLET ORAL 2 TIMES DAILY
COMMUNITY
Start: 2020-01-01 | End: 2020-01-01

## 2020-01-01 RX ORDER — GUAIFENESIN AND CODEINE PHOSPHATE 100; 10 MG/5ML; MG/5ML
10 SOLUTION ORAL 3 TIMES DAILY PRN
Qty: 480 ML | Refills: 1 | Status: SHIPPED | OUTPATIENT
Start: 2020-01-01

## 2020-01-01 RX ORDER — METOPROLOL SUCCINATE 50 MG/1
50 TABLET, EXTENDED RELEASE ORAL DAILY
Status: DISCONTINUED | OUTPATIENT
Start: 2020-01-01 | End: 2020-01-01 | Stop reason: HOSPADM

## 2020-01-01 RX ORDER — MIDAZOLAM HYDROCHLORIDE 1 MG/ML
INJECTION INTRAMUSCULAR; INTRAVENOUS
Status: COMPLETED | OUTPATIENT
Start: 2020-01-01 | End: 2020-01-01

## 2020-01-01 RX ORDER — SODIUM CHLORIDE 9 MG/ML
125 INJECTION, SOLUTION INTRAVENOUS CONTINUOUS
Status: DISCONTINUED | OUTPATIENT
Start: 2020-01-01 | End: 2020-01-01

## 2020-01-01 RX ORDER — KETOROLAC TROMETHAMINE 15 MG/ML
15 INJECTION, SOLUTION INTRAMUSCULAR; INTRAVENOUS EVERY 6 HOURS PRN
Status: DISCONTINUED | OUTPATIENT
Start: 2020-01-01 | End: 2020-01-01 | Stop reason: HOSPADM

## 2020-01-01 RX ORDER — DOXAZOSIN 8 MG/1
TABLET ORAL
Qty: 180 TABLET | Refills: 3 | Status: SHIPPED | OUTPATIENT
Start: 2020-01-01 | End: 2020-01-01 | Stop reason: ALTCHOICE

## 2020-01-01 RX ORDER — TERAZOSIN 5 MG/1
10 CAPSULE ORAL EVERY 12 HOURS SCHEDULED
Status: DISCONTINUED | OUTPATIENT
Start: 2020-01-01 | End: 2020-01-01

## 2020-01-01 RX ORDER — FUROSEMIDE 10 MG/ML
40 INJECTION INTRAMUSCULAR; INTRAVENOUS ONCE
Status: DISCONTINUED | OUTPATIENT
Start: 2020-01-01 | End: 2020-01-01

## 2020-01-01 RX ORDER — ONDANSETRON HYDROCHLORIDE 8 MG/1
8 TABLET, FILM COATED ORAL EVERY 8 HOURS PRN
COMMUNITY
End: 2020-01-01 | Stop reason: SDUPTHER

## 2020-01-01 RX ORDER — MIDAZOLAM HYDROCHLORIDE 1 MG/ML
INJECTION INTRAMUSCULAR; INTRAVENOUS
Status: COMPLETED
Start: 2020-01-01 | End: 2020-01-01

## 2020-01-01 RX ORDER — OXYCODONE HYDROCHLORIDE 5 MG/1
2.5 TABLET ORAL
Status: DISCONTINUED | OUTPATIENT
Start: 2020-01-01 | End: 2020-01-01 | Stop reason: HOSPADM

## 2020-01-01 RX ORDER — OXYCODONE HYDROCHLORIDE 5 MG/1
2.5 TABLET ORAL
Qty: 18 TABLET | Refills: 0 | Status: SHIPPED | OUTPATIENT
Start: 2020-01-01 | End: 2020-01-01

## 2020-01-01 RX ORDER — MORPHINE SULFATE 4 MG/ML
4 INJECTION, SOLUTION INTRAMUSCULAR; INTRAVENOUS
Status: DISCONTINUED | OUTPATIENT
Start: 2020-01-01 | End: 2020-01-01

## 2020-01-01 RX ORDER — ONDANSETRON 8 MG/1
TABLET, ORALLY DISINTEGRATING ORAL
COMMUNITY
Start: 2020-01-01 | End: 2020-01-01 | Stop reason: SDUPTHER

## 2020-01-01 RX ORDER — LOSARTAN POTASSIUM 50 MG/1
100 TABLET ORAL DAILY
Status: CANCELLED | OUTPATIENT
Start: 2020-01-01

## 2020-01-01 RX ORDER — GUAIFENESIN/DEXTROMETHORPHAN 100-10MG/5
5 SYRUP ORAL EVERY 6 HOURS PRN
Status: DISCONTINUED | OUTPATIENT
Start: 2020-01-01 | End: 2020-01-01 | Stop reason: HOSPADM

## 2020-01-01 RX ORDER — ONDANSETRON HYDROCHLORIDE 8 MG/1
8 TABLET, FILM COATED ORAL EVERY 8 HOURS PRN
Qty: 30 TABLET | Refills: 3 | Status: SHIPPED | OUTPATIENT
Start: 2020-01-01 | End: 2020-01-01

## 2020-01-01 RX ORDER — CHOLECALCIFEROL (VITAMIN D3) 125 MCG
5 CAPSULE ORAL NIGHTLY PRN
Status: DISCONTINUED | OUTPATIENT
Start: 2020-01-01 | End: 2020-01-01 | Stop reason: HOSPADM

## 2020-01-01 RX ORDER — MORPHINE SULFATE 2 MG/ML
2 INJECTION, SOLUTION INTRAMUSCULAR; INTRAVENOUS ONCE
Status: COMPLETED | OUTPATIENT
Start: 2020-01-01 | End: 2020-01-01

## 2020-01-01 RX ORDER — IPRATROPIUM BROMIDE AND ALBUTEROL SULFATE 2.5; .5 MG/3ML; MG/3ML
3 SOLUTION RESPIRATORY (INHALATION)
Status: CANCELLED | OUTPATIENT
Start: 2020-01-01

## 2020-01-01 RX ORDER — SODIUM CHLORIDE 9 MG/ML
250 INJECTION, SOLUTION INTRAVENOUS ONCE
Status: DISCONTINUED | OUTPATIENT
Start: 2020-01-01 | End: 2020-01-01 | Stop reason: HOSPADM

## 2020-01-01 RX ORDER — DOCUSATE SODIUM 100 MG/1
100 CAPSULE, LIQUID FILLED ORAL 2 TIMES DAILY PRN
COMMUNITY
End: 2020-01-01

## 2020-01-01 RX ORDER — HYDROMORPHONE HYDROCHLORIDE 1 MG/ML
0.5 INJECTION, SOLUTION INTRAMUSCULAR; INTRAVENOUS; SUBCUTANEOUS
Status: CANCELLED | OUTPATIENT
Start: 2020-01-01 | End: 2020-05-29

## 2020-01-01 RX ORDER — MAGNESIUM SULFATE HEPTAHYDRATE 40 MG/ML
2 INJECTION, SOLUTION INTRAVENOUS AS NEEDED
Status: DISCONTINUED | OUTPATIENT
Start: 2020-01-01 | End: 2020-01-01 | Stop reason: HOSPADM

## 2020-01-01 RX ORDER — GABAPENTIN 100 MG/1
200 CAPSULE ORAL 3 TIMES DAILY
Qty: 180 CAPSULE | Refills: 0 | Status: SHIPPED | OUTPATIENT
Start: 2020-01-01 | End: 2020-01-01

## 2020-01-01 RX ORDER — HALOPERIDOL 5 MG/ML
2 INJECTION INTRAMUSCULAR EVERY 4 HOURS PRN
Status: DISCONTINUED | OUTPATIENT
Start: 2020-01-01 | End: 2020-01-01 | Stop reason: HOSPADM

## 2020-01-01 RX ADMIN — HYDROMORPHONE HYDROCHLORIDE 0.5 MG: 1 INJECTION, SOLUTION INTRAMUSCULAR; INTRAVENOUS; SUBCUTANEOUS at 03:47

## 2020-01-01 RX ADMIN — IPRATROPIUM BROMIDE AND ALBUTEROL SULFATE 3 ML: .5; 3 SOLUTION RESPIRATORY (INHALATION) at 16:21

## 2020-01-01 RX ADMIN — VANCOMYCIN HYDROCHLORIDE 1750 MG: 10 INJECTION, POWDER, LYOPHILIZED, FOR SOLUTION INTRAVENOUS at 10:07

## 2020-01-01 RX ADMIN — LOSARTAN POTASSIUM 50 MG: 50 TABLET ORAL at 18:27

## 2020-01-01 RX ADMIN — TRAMADOL HYDROCHLORIDE 50 MG: 50 TABLET, FILM COATED ORAL at 11:09

## 2020-01-01 RX ADMIN — FENTANYL CITRATE 50 MCG: 50 INJECTION, SOLUTION INTRAMUSCULAR; INTRAVENOUS at 09:29

## 2020-01-01 RX ADMIN — MIDAZOLAM 1 MG: 1 INJECTION INTRAMUSCULAR; INTRAVENOUS at 09:43

## 2020-01-01 RX ADMIN — PANTOPRAZOLE SODIUM 40 MG: 40 TABLET, DELAYED RELEASE ORAL at 18:31

## 2020-01-01 RX ADMIN — SODIUM CHLORIDE, PRESERVATIVE FREE 10 ML: 5 INJECTION INTRAVENOUS at 09:33

## 2020-01-01 RX ADMIN — IOPAMIDOL 75 ML: 755 INJECTION, SOLUTION INTRAVENOUS at 20:09

## 2020-01-01 RX ADMIN — SODIUM CHLORIDE, PRESERVATIVE FREE 10 ML: 5 INJECTION INTRAVENOUS at 17:12

## 2020-01-01 RX ADMIN — DOXYCYCLINE 100 MG: 100 CAPSULE ORAL at 20:56

## 2020-01-01 RX ADMIN — GUAIFENESIN AND DEXTROMETHORPHAN 5 ML: 100; 10 SYRUP ORAL at 21:00

## 2020-01-01 RX ADMIN — FUROSEMIDE 40 MG: 10 INJECTION, SOLUTION INTRAMUSCULAR; INTRAVENOUS at 16:08

## 2020-01-01 RX ADMIN — MIDAZOLAM 1 MG: 1 INJECTION INTRAMUSCULAR; INTRAVENOUS at 09:39

## 2020-01-01 RX ADMIN — METOPROLOL SUCCINATE 100 MG: 100 TABLET, EXTENDED RELEASE ORAL at 09:02

## 2020-01-01 RX ADMIN — PALONOSETRON 0.25 MG: 0.25 INJECTION, SOLUTION INTRAVENOUS at 11:56

## 2020-01-01 RX ADMIN — TERAZOSIN HYDROCHLORIDE 5 MG: 5 CAPSULE ORAL at 20:56

## 2020-01-01 RX ADMIN — BENZONATATE 200 MG: 100 CAPSULE ORAL at 21:00

## 2020-01-01 RX ADMIN — MELATONIN TAB 5 MG 5 MG: 5 TAB at 23:24

## 2020-01-01 RX ADMIN — ONDANSETRON 4 MG: 2 INJECTION INTRAMUSCULAR; INTRAVENOUS at 09:26

## 2020-01-01 RX ADMIN — AXITINIB 5 MG: 5 TABLET, FILM COATED ORAL at 10:42

## 2020-01-01 RX ADMIN — GADOBENATE DIMEGLUMINE 18 ML: 529 INJECTION, SOLUTION INTRAVENOUS at 14:24

## 2020-01-01 RX ADMIN — MELATONIN TAB 5 MG 5 MG: 5 TAB at 22:20

## 2020-01-01 RX ADMIN — TAZOBACTAM SODIUM AND PIPERACILLIN SODIUM 3.38 G: 375; 3 INJECTION, SOLUTION INTRAVENOUS at 10:06

## 2020-01-01 RX ADMIN — SODIUM CHLORIDE 150 MG: 9 INJECTION, SOLUTION INTRAVENOUS at 11:58

## 2020-01-01 RX ADMIN — Medication 25.9 MILLICURIE: at 11:00

## 2020-01-01 RX ADMIN — IPRATROPIUM BROMIDE AND ALBUTEROL SULFATE 3 ML: .5; 3 SOLUTION RESPIRATORY (INHALATION) at 19:11

## 2020-01-01 RX ADMIN — CARBOPLATIN 450 MG: 10 INJECTION, SOLUTION INTRAVENOUS at 13:18

## 2020-01-01 RX ADMIN — SODIUM CHLORIDE 250 ML: 9 INJECTION, SOLUTION INTRAVENOUS at 14:25

## 2020-01-01 RX ADMIN — ALBUTEROL SULFATE 2.5 MG: 2.5 SOLUTION RESPIRATORY (INHALATION) at 17:41

## 2020-01-01 RX ADMIN — PANTOPRAZOLE SODIUM 40 MG: 40 TABLET, DELAYED RELEASE ORAL at 08:53

## 2020-01-01 RX ADMIN — LORAZEPAM 0.5 MG: 2 INJECTION INTRAMUSCULAR; INTRAVENOUS at 20:07

## 2020-01-01 RX ADMIN — FENTANYL CITRATE 50 MCG: 50 INJECTION, SOLUTION INTRAMUSCULAR; INTRAVENOUS at 09:33

## 2020-01-01 RX ADMIN — HYDROCODONE POLISTIREX AND CHLORPHENIRAMINE POLISTIREX 2.5 ML: 10; 8 SUSPENSION, EXTENDED RELEASE ORAL at 19:51

## 2020-01-01 RX ADMIN — FUROSEMIDE 20 MG: 10 INJECTION, SOLUTION INTRAMUSCULAR; INTRAVENOUS at 20:21

## 2020-01-01 RX ADMIN — SODIUM CHLORIDE, PRESERVATIVE FREE 10 ML: 5 INJECTION INTRAVENOUS at 08:25

## 2020-01-01 RX ADMIN — HYDROMORPHONE HYDROCHLORIDE 0.5 MG: 1 INJECTION, SOLUTION INTRAMUSCULAR; INTRAVENOUS; SUBCUTANEOUS at 01:03

## 2020-01-01 RX ADMIN — TRAMADOL HYDROCHLORIDE 50 MG: 50 TABLET, FILM COATED ORAL at 01:12

## 2020-01-01 RX ADMIN — PANTOPRAZOLE SODIUM 40 MG: 40 TABLET, DELAYED RELEASE ORAL at 09:02

## 2020-01-01 RX ADMIN — HYDROMORPHONE HYDROCHLORIDE 0.5 MG: 1 INJECTION, SOLUTION INTRAMUSCULAR; INTRAVENOUS; SUBCUTANEOUS at 07:53

## 2020-01-01 RX ADMIN — DEXAMETHASONE SODIUM PHOSPHATE 12 MG: 4 INJECTION, SOLUTION INTRAMUSCULAR; INTRAVENOUS at 14:25

## 2020-01-01 RX ADMIN — MORPHINE SULFATE 2 MG: 2 INJECTION, SOLUTION INTRAMUSCULAR; INTRAVENOUS at 09:26

## 2020-01-01 RX ADMIN — DOXYCYCLINE 100 MG: 100 CAPSULE ORAL at 09:02

## 2020-01-01 RX ADMIN — MORPHINE SULFATE 4 MG: 4 INJECTION, SOLUTION INTRAMUSCULAR; INTRAVENOUS at 18:27

## 2020-01-01 RX ADMIN — AXITINIB 5 MG: 5 TABLET, FILM COATED ORAL at 12:50

## 2020-01-01 RX ADMIN — METOPROLOL SUCCINATE 100 MG: 100 TABLET, EXTENDED RELEASE ORAL at 08:53

## 2020-01-01 RX ADMIN — SODIUM CHLORIDE 200 MG: 9 INJECTION, SOLUTION INTRAVENOUS at 14:57

## 2020-01-01 RX ADMIN — IPRATROPIUM BROMIDE AND ALBUTEROL SULFATE 3 ML: .5; 3 SOLUTION RESPIRATORY (INHALATION) at 23:26

## 2020-01-01 RX ADMIN — AXITINIB 5 MG: 5 TABLET, FILM COATED ORAL at 23:00

## 2020-01-01 RX ADMIN — DEXAMETHASONE SODIUM PHOSPHATE 12 MG: 4 INJECTION, SOLUTION INTRAMUSCULAR; INTRAVENOUS at 11:58

## 2020-01-01 RX ADMIN — SODIUM CHLORIDE, PRESERVATIVE FREE 10 ML: 5 INJECTION INTRAVENOUS at 20:58

## 2020-01-01 RX ADMIN — MIDAZOLAM 1 MG: 1 INJECTION INTRAMUSCULAR; INTRAVENOUS at 09:29

## 2020-01-01 RX ADMIN — LORAZEPAM 0.5 MG: 2 INJECTION INTRAMUSCULAR; INTRAVENOUS at 15:26

## 2020-01-01 RX ADMIN — HYDROMORPHONE HYDROCHLORIDE 0.5 MG: 1 INJECTION, SOLUTION INTRAMUSCULAR; INTRAVENOUS; SUBCUTANEOUS at 14:02

## 2020-01-01 RX ADMIN — TERAZOSIN HYDROCHLORIDE 5 MG: 5 CAPSULE ORAL at 08:54

## 2020-01-01 RX ADMIN — HYDROMORPHONE HYDROCHLORIDE 0.5 MG: 1 INJECTION, SOLUTION INTRAMUSCULAR; INTRAVENOUS; SUBCUTANEOUS at 18:21

## 2020-01-01 RX ADMIN — METHYLPREDNISOLONE SODIUM SUCCINATE 125 MG: 125 INJECTION, POWDER, FOR SOLUTION INTRAMUSCULAR; INTRAVENOUS at 14:54

## 2020-01-01 RX ADMIN — SODIUM CHLORIDE 250 ML: 9 INJECTION, SOLUTION INTRAVENOUS at 11:00

## 2020-01-01 RX ADMIN — LIDOCAINE HYDROCHLORIDE 20 ML: 10 INJECTION, SOLUTION INFILTRATION; PERINEURAL at 12:30

## 2020-01-01 RX ADMIN — SODIUM CHLORIDE 200 MG: 9 INJECTION, SOLUTION INTRAVENOUS at 11:03

## 2020-01-01 RX ADMIN — METOPROLOL SUCCINATE 50 MG: 50 TABLET, EXTENDED RELEASE ORAL at 18:27

## 2020-01-01 RX ADMIN — VANCOMYCIN HYDROCHLORIDE 1750 MG: 10 INJECTION, POWDER, LYOPHILIZED, FOR SOLUTION INTRAVENOUS at 15:28

## 2020-01-01 RX ADMIN — AXITINIB 5 MG: 5 TABLET, FILM COATED ORAL at 23:18

## 2020-01-01 RX ADMIN — LORAZEPAM 0.5 MG: 2 INJECTION INTRAMUSCULAR; INTRAVENOUS at 05:50

## 2020-01-01 RX ADMIN — SODIUM CHLORIDE 200 MG: 9 INJECTION, SOLUTION INTRAVENOUS at 11:00

## 2020-01-01 RX ADMIN — IPRATROPIUM BROMIDE AND ALBUTEROL SULFATE 3 ML: .5; 3 SOLUTION RESPIRATORY (INHALATION) at 07:12

## 2020-01-01 RX ADMIN — SODIUM CHLORIDE, PRESERVATIVE FREE 10 ML: 5 INJECTION INTRAVENOUS at 20:17

## 2020-01-01 RX ADMIN — SODIUM CHLORIDE, POTASSIUM CHLORIDE, SODIUM LACTATE AND CALCIUM CHLORIDE 500 ML: 600; 310; 30; 20 INJECTION, SOLUTION INTRAVENOUS at 20:31

## 2020-01-01 RX ADMIN — SODIUM CHLORIDE, PRESERVATIVE FREE 10 ML: 5 INJECTION INTRAVENOUS at 20:56

## 2020-01-01 RX ADMIN — TRAMADOL HYDROCHLORIDE 50 MG: 50 TABLET, FILM COATED ORAL at 12:35

## 2020-01-01 RX ADMIN — HYDROCODONE POLISTIREX AND CHLORPHENIRAMINE POLISTIREX 2.5 ML: 10; 8 SUSPENSION, EXTENDED RELEASE ORAL at 08:54

## 2020-01-01 RX ADMIN — HYDROMORPHONE HYDROCHLORIDE 0.5 MG: 1 INJECTION, SOLUTION INTRAMUSCULAR; INTRAVENOUS; SUBCUTANEOUS at 01:46

## 2020-01-01 RX ADMIN — MINERAL OIL: 1000 LIQUID ORAL at 16:30

## 2020-01-01 RX ADMIN — GUAIFENESIN AND DEXTROMETHORPHAN 5 ML: 100; 10 SYRUP ORAL at 12:54

## 2020-01-01 RX ADMIN — BENZONATATE 200 MG: 100 CAPSULE ORAL at 12:54

## 2020-01-01 RX ADMIN — MINERAL OIL: 1000 LIQUID ORAL at 20:07

## 2020-01-01 RX ADMIN — LOSARTAN POTASSIUM 50 MG: 50 TABLET ORAL at 08:24

## 2020-01-01 RX ADMIN — LORAZEPAM 0.5 MG: 2 INJECTION INTRAMUSCULAR; INTRAVENOUS at 18:53

## 2020-01-01 RX ADMIN — SODIUM CHLORIDE, PRESERVATIVE FREE 10 ML: 5 INJECTION INTRAVENOUS at 09:01

## 2020-01-01 RX ADMIN — METHYLPREDNISOLONE SODIUM SUCCINATE 125 MG: 125 INJECTION, POWDER, FOR SOLUTION INTRAMUSCULAR; INTRAVENOUS at 09:32

## 2020-01-01 RX ADMIN — MIDAZOLAM 1 MG: 1 INJECTION INTRAMUSCULAR; INTRAVENOUS at 09:33

## 2020-01-01 RX ADMIN — FUROSEMIDE 40 MG: 10 INJECTION, SOLUTION INTRAMUSCULAR; INTRAVENOUS at 20:58

## 2020-01-01 RX ADMIN — FENTANYL CITRATE 50 MCG: 50 INJECTION, SOLUTION INTRAMUSCULAR; INTRAVENOUS at 09:43

## 2020-01-01 RX ADMIN — LIDOCAINE HYDROCHLORIDE 20 ML: 10 INJECTION, SOLUTION INFILTRATION; PERINEURAL at 15:03

## 2020-01-01 RX ADMIN — DOXYCYCLINE 100 MG: 100 CAPSULE ORAL at 23:18

## 2020-01-01 RX ADMIN — METHYLPREDNISOLONE SODIUM SUCCINATE 125 MG: 125 INJECTION, POWDER, FOR SOLUTION INTRAMUSCULAR; INTRAVENOUS at 20:17

## 2020-01-01 RX ADMIN — Medication: at 14:09

## 2020-01-01 RX ADMIN — GEMCITABINE 2000 MG: 38 INJECTION, SOLUTION INTRAVENOUS at 14:44

## 2020-01-01 RX ADMIN — LORAZEPAM 0.5 MG: 2 INJECTION INTRAMUSCULAR; INTRAVENOUS at 01:30

## 2020-01-01 RX ADMIN — DOXYCYCLINE 100 MG: 100 CAPSULE ORAL at 08:53

## 2020-01-01 RX ADMIN — GEMCITABINE 2000 MG: 38 INJECTION INTRAVENOUS at 12:36

## 2020-01-01 RX ADMIN — HYDROMORPHONE HYDROCHLORIDE 0.5 MG: 1 INJECTION, SOLUTION INTRAMUSCULAR; INTRAVENOUS; SUBCUTANEOUS at 19:50

## 2020-01-01 RX ADMIN — TAZOBACTAM SODIUM AND PIPERACILLIN SODIUM 4.5 G: 500; 4 INJECTION, SOLUTION INTRAVENOUS at 14:30

## 2020-01-01 RX ADMIN — SODIUM CHLORIDE, PRESERVATIVE FREE 10 ML: 5 INJECTION INTRAVENOUS at 22:54

## 2020-01-01 RX ADMIN — GUAIFENESIN AND DEXTROMETHORPHAN 5 ML: 100; 10 SYRUP ORAL at 13:37

## 2020-01-01 RX ADMIN — GUAIFENESIN AND DEXTROMETHORPHAN 5 ML: 100; 10 SYRUP ORAL at 23:24

## 2020-01-01 RX ADMIN — TERAZOSIN HYDROCHLORIDE 5 MG: 5 CAPSULE ORAL at 09:02

## 2020-01-01 RX ADMIN — GADOBENATE DIMEGLUMINE 19 ML: 529 INJECTION, SOLUTION INTRAVENOUS at 17:05

## 2020-01-03 NOTE — PROGRESS NOTES
Oral Chemotherapy Teaching      Patient Name/:  Alphonso Evans   1965  Oral Chemotherapy Regimen:  Axitinib 5mg PO BID + Pembrolizumab IV on Day 1 of a 21 Day cycle  Date Started Medication: Cycle 1: anticipate 20      Additional Notes: Received notification from , plan to proceed with Axitinib procurement. Pt scheduled for oral chemo education on 20. First infusion of Pembrolizumab scheduled after MD appt on 20. Released script for Axitinib 5mg PO BID #60 with 3 RF to [facility] to begin process for acquisition.

## 2020-01-04 NOTE — OUTREACH NOTE
Medical Week 2 Survey      Responses   Facility patient discharged from?  [facility]   Does the patient have one of the following disease processes/diagnoses(primary or secondary)?  Other   Week 2 attempt successful?  No   Unsuccessful attempts  Attempt 1          Anmol Zavala RN

## 2020-01-06 NOTE — TELEPHONE ENCOUNTER
Pt states that he called earlier asking for steroids and he was asked why. Pt states that pain pills and muscle relaxers do not sit well with his stomach so steroids are the only thing that helps take the edge off. He has a cyberblast scheduled for Thursday on his back and he needs that pain relief to function, as long as it won't interfere with the procedure of course.    Please give him a call back at

## 2020-01-06 NOTE — TELEPHONE ENCOUNTER
Please pend order for MRI.  Pt will be having one treatment on 1/9/20 and his 3 month follow up is scheduled.  MRI TBD on the same day.    We have rescheduled Leslie's appointment for 1/13 to the [facility] in April.

## 2020-01-06 NOTE — TELEPHONE ENCOUNTER
Patient's sister called patient wants to know if he can get a prescription for Decadron? Is so please call this into [facility] on [address], [place].

## 2020-01-06 NOTE — TELEPHONE ENCOUNTER
[phone]  LMOM as to why PT is requesting this medication and my return information.  [id number] ~[place]

## 2020-01-07 NOTE — PROGRESS NOTES
Oral Chemotherapy Teaching      Patient Name/:  Alphonso Evans   1965  Oral Chemotherapy Regimen:  Axitinib 5mg PO BID + Pembrolizumab IV on Day 1 of a 21 Day cycle  Date Started Medication: Cycle 1: anticipate 20      Initial Teaching Follow Up Comments     Safety     Storage instructions (away from children; away from heat/cold, sunlight, or moisture), handling - use of gloves (caregivers), washing hands after touching pills, managing waste     “How are you storing your medications?”, reminders on storage, proper handling (caregivers using gloves, washing hands, away from children, managing waste, etc.), disposal of medication with D/C or dosage change    Patient was counseled to store axitinib in a cool and dry place away from children. Patient was educated on precaution measures to take when handling medications and bodily fluids such as hand washing and the use of gloves.     Adherence      patient and/or caregiver on how to take medication, take with/without food, assess their adherence potential, stress importance of adherence, ways to manage adherence (pill boxes, phone reminders, calendars), what to do if miss a dose   “How are you taking your medication?” “How are you remembering to take your medication?”, “How many doses have you missed?”, determine reasons for non-adherence (not remembering, side effects, etc), ways to improve, overadherence? Remind patient of ways to improve/maintain adherence   Additionally, patient was educated to take medications around the same time twice daily with or without food. Patient was counseled to swallow medications whole and avoid crushing, dissolving, or cutting medications. Pill boxes, medication alarms, and diaries were suggested. Separate pill boxes are encouraged from other medications if utilized. Patient was counseled to avoid taking two doses at one time and on procedures if a dose was forgotten.     Side Effects/Adverse Reactions       patient on potential side effects, s/s, ways to manage, when to call MD/seek help     Determine if patient experiencing side effects, ways to manage  Patient was informed that Keytruda is often well tolerated but sometimes times takes a while to work. Side effects such as fatigue, muscle pain, nausea, persistent cough/ pneumonitis, uncontrolled diarrhea/colitis, rash, hyperglycemia were discussed. Side effects of axitinib including hypertension, diarrhea, voice hoarseness, hand-foot syndrome, proteinuria, and decreased kidney function were also discussed. Strategies to manage these side effects such as emollients, Imodium, Zofran, adequate hydration, and steroids were suggested to the patient. The importance of lab monitoring to monitor kidney, liver, and thyroid function was highlighted. Patient was informed to contact Dr. Ceballos's office if he experiences a persistent cough, uncontrolled diarrhea, or fever of 100.4F or greater.     Miscellaneous     Food interactions, DDIs, financial issues Determine if patient started any new medications since being placed on oral chemo (analyze for DDI) Patient was educated to avoid grapefruit juice while taking his chemotherapy regimen. Additionally, patient was informed of a lack of major drug interactions between his current  home medications and chemotherapy regimen. Patient's wife was concerned about the cost of the Zofran prescription at [facility] but is going to check if correct insurance was utilized.      Additional Notes:  Patient had yet to receive axitinib in the mail from [facility]. Discussed aforementioned material with patient in clinic. All questions and concerns addressed. Provided patient with contact information for oral chemo clinic and instructions to call should additional questions arise. Obtained signed CCA and consent.

## 2020-01-07 NOTE — PROGRESS NOTES
Received notification from [employer], Axitinib must process through [facility] per insurance restrictions. Plan for patient to start therapy on with Axitinib on same day as first pembro infusion, currently scheduled 1/13/20. Released script for Axitnib 5mg PO BID to [facility] for processing.

## 2020-01-07 NOTE — PROGRESS NOTES
CHEMOTHERAPY PREPARATION    Alphonso Evans  [id number]  1965    Chief Complaint: Chemotherapy preparation    History of present illness:  Alphonso Evans is a 54 y.o. year old male who is here today for chemotherapy preparation and needs assessment. The patient has been diagnosed with metastatic renal cell cancer and is scheduled to begin oral and IV treatment with axitinib and Keytruda.     Oncology History:    Oncology/Hematology History    1. Kidney cancer  2. Biopsy-proven liver metastases  3. Radiographic evidence of bone metastases and lung metastases  4. Probable adrenal metastases    Oncology timeline:  -12/18/2019 presented to emergency room with back pain.  Started while riding horses 12/11/2019 but began suddenly.  Went to chiropractor with no results.  Went to  emergency room on 12/14/2018 due to the pain radiating down into his lower extremities and they gave him steroids and a muscle relaxer.  12/18/2019 MRI emergency room showed L4 osseous tumor with extension into the paraspinal soft tissues as well as posterior extradural spinal canal with severe compression of the thecal sac at L2.  CTs of chest abdomen pelvis revealed mediastinal adenopathy, multiple lung nodules concerning for metastasis, and likely rib metastases.  There are abnormalities in the liver consistent with metastasis and a 6.3 cm left renal mass likely the primary as well as a left adrenal mass and involvement of the right adrenal gland.  MRI of lumbar and thoracic spine revealed multiple degenerative changes.  Saw Dr. Rizo who did not recommend surgery and they recommended CyberKnife radiation for which Dr. Madrigal evaluated 12/18/2019.  -12/20/2019 CT-guided liver mass biopsy suggestive of atypical non-clear-cell renal cell carcinoma sent to [facility][1] for further consultation.  Favor white count of 15,000, CBC and CMP unremarkable.  PSA normal at 2.69.  Per Dr. Pruitt this could be a collecting duct carcinoma  or another rare variant of a kidney cancer.    If this is a collecting duct carcinoma, this would be a very aggressive subtype of renal cell carcinoma.  Patients with a fumarate hydratase genomic alteration might benefit from M tor inhibitors particularly if there is in an NF2 alteration.  [id number] suggests cisplatin resistance.  CDKN2A is seen in about 62% of these.  Prospective trials are limited in number and number of patients but older data suggests benefit of cisplatin gemcitabine while newer data casts doubt on such.  There are also conflicting reports of response and lack thereof to targeted therapy such as tyrosine kinase inhibitors with sorafenib and sunitinib and Cabozantanib having variable responses between 30 to 50%.  PDL 1 expression is highly variable and immunotherapy while not universally helpful may have a role.  There is a clinical trial looking at gemcitabine cisplatin plus up Avastin (GETA UG-AF u24) as well as cabozantinib (Bonsai trial).  In the meantime, I will prepare for high risk kidney cancer standard therapy with Keytruda axitinib while waiting on the above molecular testing and data [facility].  We will get an MRI of his brain as well as he has some dizziness and we need completion staging.  I have contacted radiation oncology to have them follow-up for spine radiation as his right leg is getting more numb.  We will also get in with our palliative care clinic for pain management.  We will also get an MRI of his neck with numbness now happening in his left arm.    -1/6/2020 MRI of the brain shows at least 2 sites of right frontotemporal involvement of abnormal enhancement concerning for metastatic disease.  MRI of the cervical spine negative other than for minimal degenerative changes.        Renal cancer, left (CMS/HCC)    12/22/2019 Initial Diagnosis     Renal cancer, left (CMS/HCC)      12/30/2019 Cancer Staged     Staging form: Kidney, AJCC 8th Edition  -  Clinical: Stage IV (cT1b, cN0, pM1) - Signed by Jose Coffey MD on 12/30/2019 1/6/2020 Imaging     MRI of the brain IMPRESSION:  At least 2 sites of right frontotemporal involvement of  abnormal enhancement on postcontrast sequences concerning for  intracranial metastasis as these are not well identified on precontrast  T1 sequences with attention on followup. Mild chronic small vessel  ischemic disease without acute intracranial findings otherwise noted.  MRI cervical spine IMPRESSION:  Minimal degenerative changes with no significant disc  protrusion, spinal stenosis or abnormal cord finding.      1/13/2020 -  Chemotherapy     OP KIDNEY Axitinib / Pembrolizumab         Past Medical History:   Diagnosis Date   • Hypertension        Past Surgical History:   Procedure Laterality Date   • KNEE SURGERY Left     x 3   • ROTATOR CUFF REPAIR Right 2014       MEDICATIONS: The current medication list was reviewed and reconciled.     Allergies:  has No Known Allergies.    Family History   Problem Relation Age of Onset   • Cancer Father    • Heart disease Father          Review of Systems   Constitutional: Negative for fatigue, fever and unexpected weight change.   HENT: Negative for congestion, hearing loss, sore throat and trouble swallowing.    Eyes: Negative for visual disturbance.   Respiratory: Negative for cough, shortness of breath and wheezing.    Cardiovascular: Negative for chest pain and leg swelling.   Gastrointestinal: Positive for blood in stool. Negative for abdominal distention, abdominal pain, constipation, diarrhea, nausea and vomiting.        Reports BRB per rectum this morning with bowel movement.   Endocrine: Negative.    Genitourinary: Negative.    Musculoskeletal: Positive for back pain. Negative for arthralgias and gait problem.   Skin: Negative.    Allergic/Immunologic: Negative.    Neurological: Negative for dizziness, weakness, numbness and headaches.        Tingling in hands   Hematological:  "Negative for adenopathy. Does not bruise/bleed easily.   Psychiatric/Behavioral: Negative.    All other systems reviewed and are negative.      Physical Exam  Vital Signs: BP (!) 151/102   Pulse (!) 138   Temp 98.5 °F (36.9 °C)   Resp 18   Ht 167.6 cm (66\")   Wt 93.9 kg (207 lb)   SpO2 94%   BMI 33.41 kg/m²    General Appearance:  alert, cooperative, no apparent distress, appears stated age and normal weight   Neurologic/Psychiatric: A&O x 3, gait steady, appropriate affect   HEENT:  Normocephalic, without obvious abnormality, mucous membranes moist   Lungs:   Clear to auscultation bilaterally; respirations regular, even, and unlabored bilaterally   Heart:  Regular rate and rhythm, no murmurs appreciated   Extremities: Normal, atraumatic; no clubbing, cyanosis, or edema    Skin: No rashes, lesions, or abnormal coloration noted     ECOG Performance Status: (2) Ambulatory and capable of self care, unable to carry out work activity, up and about > 50% or waking hours          NEEDS ASSESSMENTS    Genetics  The patient's new diagnosis and family history have been reviewed for genetic counseling needs. A genetic referral is not recommended.     Psychosocial  The patient has completed a PHQ-9 Depression Screening and the Distress Thermometer (DT) today.   PHQ-9 results show 1-4 (Minimal Depression). The patient scored their distress today as 0 on a scale of 0-10 with 0 being no distress and 10 being extreme distress.   Problems marked as being an issue for him within the last week include physical problems.   Results were reviewed along with psychosocial resources offered by our cancer center. Our oncology social worker will be flagged for a DT score of 4 or above, and a same day call will be made for a score of 9 or 10. A mental health referral is not recommended at this time. The patient is not accepting of a referral to [facility].   Copies of patient's questionnaires will be scanned into EMR for details " "and further reference.    Barriers to care  A barriers form was also completed by the patient today. We discussed services offered by our facility to help him have adequate access to care. The patient was given the name and card for our Oncology Social Worker, Maye Sandoval. Based upon barriers assessment today, the patient will not require a follow-up call from the  to further discuss needs.   A copy of the barriers form will also be scanned into EMR for details and further reference.     VAD Assessment  The patient and I discussed planned intervenous chemotherapy as well as other IV treatments that are often needed throughout the course of treatment. These may include, but are not limited to blood transfusions, antibiotics, and IV hydration. The patient's vasculature does appear to be adequate for multiple peripheral IVs throughout their treatment course. Discussed risks and benefits of VADs. The patient would not like to pursue Port-A-Cath insertion prior to initiation of treatment.     Advanced Care Planning  The patient and I discussed advanced care planning, \"Conversations that Matter\".   This service was offered, free of charge, for development of advance directives with a certified ACP facilitator.  The patient does not have an up-to-date advanced directive. This document is not on file with our office. The patient is not interested in an appointment with one of our facilitators to create or update their advanced directives.      Palliative Care  The patient and I discussed palliative care services. Palliative care is not the same as Hospice care. This is specialized medical care for people living with serious illness with the goal of improving quality of life for the patient and their family. [facility] has partnered with [facility] to offer our patients outpatient palliative care early along with their treatment to assist in coordination of care, symptom management, pain " management, and medical decision making.  Oncology criteria for palliative care referral is met at this time. The patient is interested in a palliative care consultation.  Referral has been made, awaiting appointment.    Additional Referral needs  Has been referred to radiation oncology, is scheduled for CyberKnife Thursday.  Rad/Onc made aware of MRI brain results with possible two metastatic lesions in brain.      IV CHEMOTHERAPY EDUCATION    Booklets Given: Chemotherapy and You []  Eating Hints []    Sexuality/Fertility Books []      Chemotherapy/Biotherapy Education Sheets: (list all that apply)  nausea management and Cancer resourse contacts information                                                                                                                                                                 Chemotherapy Regimen:   Treatment Plans     Name Type Plan dates Plan Provider         Active    OP KIDNEY Axitinib / Pembrolizumab ONCOLOGY TREATMENT  12/29/2019 - Present Jose Coffey MD                    TOPICS EDUCATION PROVIDED COMMENTS   ANEMIA:  role of RBC, cause, s/s, ways to manage, role of transfusion [x]    THROMBOCYTOPENIA:  role of platelet, cause, s/s, ways to prevent bleeding, things to avoid, when to seek help [x]    NEUTROPENIA:  role of WBC, cause, infection precautions, s/s of infection, when to call MD [x]    NUTRITION & APPETITE CHANGES:  importance of maintaining healthy diet & weight, ways to manage to improve intake, dietary consult, exercise regimen []    DIARRHEA:  causes, s/s of dehydration, ways to manage, dietary changes, when to call MD []    CONSTIPATION:  causes, ways to manage, dietary changes, when to call MD []    NAUSEA & VOMITING:  cause, use of antiemetics, dietary changes, when to call MD []    MOUTH SORES:  causes, oral care, ways to manage []    ALOPECIA:  cause, ways to manage, resources []    INFERTILITY & SEXUALITY:  causes, fertility preservation options,  sexuality changes, ways to manage, importance of birth control []    NERVOUS SYSTEM CHANGES:  causes, s/s, neuropathies, cognitive changes, ways to manage []    PAIN:  causes, ways to manage [] ????   SKIN & NAIL CHANGES:  cause, s/s, ways to manage [x]    ORGAN TOXICITIES:  cause, s/s, need for diagnostic tests, labs, when to notify MD []    SURVIVORSHIP:  distress, distress assessment, secondary malignancies, early/late effects, follow-up, social issues, social support []    HOME CARE:  use of spill kits, storing of PO chemo, how to manage bodily fluids []    MISCELLANEOUS:  drug interactions, administration, vesicant, et []                ORAL CHEMOTHERAPY TEACHING    Oral Chemotherapy Regimen: Axitinib, education provided by our pharmacy department.    Date of Medication Start: 1/13/2020        Assessment and Plan:    Diagnoses and all orders for this visit:    Renal cancer, left (CMS/HCC)  -     CBC and Differential; Future  -     Comprehensive metabolic panel; Future  -     Amylase; Future  -     Lipase; Future  -     Lab Appointment Request; Future  -     Clinic Appointment Request; Future  -     Infusion Appointment Request; Future    Essential hypertension    We reviewed the patient's MRI of the brain and MRI of the cervical spine at today's appointment along with his treatment education.  MRI of the cervical spine showed degenerative changes, MRI of the brain revealed at least 2 sites of right frontotemporal involvement of abnormal enhancement concerning for metastatic disease.  I have contacted radiation oncology to let Dr. Madrigal know, she saw him while inpatient, he is scheduled for CyberKnife to the spine this Thursday.  I also discussed MRI findings with Dr. Jose Coffey today via telephone.  This does not change our treatment plan.    The patient is hypertensive, blood pressure today 151/102.  We discussed the importance of getting his blood pressure under control before starting axitinib.  He is going  to call Dr. Mccarty for further medication adjustments.    This was a 45 minute face-to-face visit with 35 minutes spent in  counseling and coordination of care as documented above.   The patient and I have reviewed their new cancer diagnosis and scheduled treatment plan. Needs assessment was completed including genetics, psychosocial needs, barriers to care, VAD evaluation, advanced care planning, and palliative care services. Referrals have been ordered as appropriate based upon our evaluation and patient desires.     IV and oral chemotherapy teaching was also completed today as documented above. Adequate time was given to answer all questions to his satisfaction. Patient and family are aware of their care team members and contact information if they have questions or problems throughout the treatment course. Needs assessments and education has been completed. The patient is adequately prepared to begin treatment as scheduled.     He sees Dr. Coffey prior to treatment on 1/3/2020, he understands not to take his axitinib until we evaluate him and make sure his blood pressure is under good control.    Tila Nickerson, APRN

## 2020-01-07 NOTE — TELEPHONE ENCOUNTER
----- Message from Jose Coffey MD sent at 1/6/2020  4:39 PM EST -----  Regarding: RE: Medication request  Decadron 4mg po bid number 20 no refills.  ----- Message -----  From: Rafia Deluca MA  Sent: 1/6/2020   3:48 PM EST  To: Jose Coffey MD  Subject: Medication request                               PT is asking for decadron.  PT states that pain pills and muscle relaxers do not sit well with his stomach so steroids are the only thing that helps take the edge off. He has a cyberblast scheduled for Thursday on his back and he needs that pain relief to function, as long as it won't interfere with the procedure of course.  Please advise.    Thank you,     [place]

## 2020-01-07 NOTE — OUTREACH NOTE
Medical Week 2 Survey      Responses   Facility patient discharged from?  [facility]   Does the patient have one of the following disease processes/diagnoses(primary or secondary)?  Other   Week 2 attempt successful?  Yes   Call start time  1605   Discharge diagnosis  New Dx of Metastatic Cancer (path pending)   Call end time  1606   Meds reviewed with patient/caregiver?  Yes   Is the patient taking all medications as directed (includes completed medication regime)?  Yes   Has the patient kept scheduled appointments due by today?  Yes   Comments  Getting ready to start chemo/radiation Will start radiation Thursday. Chemo on Monday.   What is the patient's perception of their health status since discharge?  Same   Week 2 Call Completed?  Yes          Emely De La Cruz RN

## 2020-01-09 NOTE — PROGRESS NOTES
CyberKnife stereotactic radiosurgery note    Preoperative diagnosis    Patient is an unfortunate 54-year-old gentleman with a history of metastatic renal cell cancer with a dorsally located lesion behind the L2-3 area with lateral recess cord compression of the exiting nerve root    Postoperative diagnosis  Same    Procedures    CyberKnife stereotactic radiosurgery to the right paraspinal lesion behind the L2 vertebral body he read 1400 cGy in 1 fraction    Neurosurgeon Michael Rizo M.D.  Radiation oncologist Romana Madrigal M.D.      Procedure in detail      I personally saw the patient today examined him in the CyberKnife suite he has no evidence of progressive weakness still with numbness in the anterior medial thigh denies any new weakness or other symptomatologies      Patient was taken to the CyberKnife stereotactic surgery suite placed in his oculoplastic mask.  The MRI and CT scans which had been fused were used pre-surgically at an independent workstation to create an outline of the treatment bed.  This was reviewed by the radiosurgery treatment team.  At this point in time the treatment plan was administered by taking the CT derived DR R images and aligning them and this was triple checked in the radio surgical suite.  The patient received dosing per the CyberKnife stereotactic radiosurgery plan which I personally outlined and this was administered with the assistance of a radiation physicist and Romana Madrigal  No immediate complications noted    The patient will follow-up for follow-up MRIs and a proximally 1 month months to monitor his progress and I was present personally available within the hospital facility during the treatment and planned the targeting personally.  Additionally I got cranial imaging given the suspicion for possible small metastatic lesion in the head however it is not convincing at this point in time    I explicitly explained the signs and symptoms to look for to necessitate a  referral back to the clinic sooner

## 2020-01-13 NOTE — PROGRESS NOTES
Oral Chemotherapy Teaching      Patient Name/:  Alphonso Evans   1965  Oral Chemotherapy Regimen:  Axitinib 5mg PO BID + Pembrolizumab IV on Day 1 of a 21 Day cycle  Date Started Medication: Cycle 1: 20 of Pembrolizumab IV (axitinib held)    Additional Notes:  Patient has received a two week supply of axitinib in the mail from [facility] pharmacy. He was told to call [facility] back after completing his first week of medication, and they would send the rest of the 30 day supply. Of note, pt was told to hold beginning axitinib by Dr. Coffey due to an increased SBP in the 160s at infusion center on 20. He is in process of contacting his PCP for additional blood pressure management. Pt was given an additional treatment calender and drug education materials. Additionally, pt was reminded of infection precautions, medication adherence, and medication storage.  Provided patient with contact information for oral chemo clinic and instructions to call should additional questions arise.

## 2020-01-13 NOTE — PROGRESS NOTES
CHIEF COMPLAINT: Metastatic kidney cancer    Problem List:  Oncology/Hematology History    1. Kidney cancer  2. Biopsy-proven liver metastases  3. Radiographic evidence of bone metastases and lung metastases  4. Probable adrenal metastases    Oncology timeline:  -12/18/2019 presented to emergency room with back pain.  Started while riding horses 12/11/2019 but began suddenly.  Went to chiropractor with no results.  Went to  emergency room on 12/14/2018 due to the pain radiating down into his lower extremities and they gave him steroids and a muscle relaxer.  12/18/2019 MRI emergency room showed L4 osseous tumor with extension into the paraspinal soft tissues as well as posterior extradural spinal canal with severe compression of the thecal sac at L2.  CTs of chest abdomen pelvis revealed mediastinal adenopathy, multiple lung nodules concerning for metastasis, and likely rib metastases.  There are abnormalities in the liver consistent with metastasis and a 6.3 cm left renal mass likely the primary as well as a left adrenal mass and involvement of the right adrenal gland.  MRI of lumbar and thoracic spine revealed multiple degenerative changes.  Saw Dr. Rizo who did not recommend surgery and they recommended CyberKnife radiation for which Dr. Madrigal evaluated 12/18/2019.  -12/20/2019 CT-guided liver mass biopsy suggestive of atypical non-clear-cell renal cell carcinoma sent to [facility] for further consultation.  Favor white count of 15,000, CBC and CMP unremarkable.  PSA normal at 2.69.  Per Dr. Pruitt this could be a collecting duct carcinoma or another rare variant of a kidney cancer.    If this is a collecting duct carcinoma, this would be a very aggressive subtype of renal cell carcinoma.  Patients with a fumarate hydratase genomic alteration might benefit from M tor inhibitors particularly if there is in an NF2 alteration.  APE2F55 suggests cisplatin resistance.  CDKN2A is seen in about 62% of these.   Prospective trials are limited in number and number of patients but older data suggests benefit of cisplatin gemcitabine while newer data casts doubt on such.  There are also conflicting reports of response and lack thereof to targeted therapy such as tyrosine kinase inhibitors with sorafenib and sunitinib and Cabozantanib having variable responses between 30 to 50%.  PDL 1 expression is highly variable and immunotherapy while not universally helpful may have a role.  There is a clinical trial looking at gemcitabine cisplatin plus up Avastin (GETA UG-AF u24) as well as cabozantinib (Bonsai trial).  In the meantime, I will prepare for high risk kidney cancer standard therapy with Keytruda axitinib while waiting on the above molecular testing and data [facility].  We will get an MRI of his brain as well as he has some dizziness and we need completion staging.  I have contacted radiation oncology to have them follow-up for spine radiation as his right leg is getting more numb.  We will also get in with our palliative care clinic for pain management.  We will also get an MRI of his neck with numbness now happening in his left arm.    -1/6/2020 MRI of the brain shows at least 2 sites of right frontotemporal involvement of abnormal enhancement concerning for metastatic disease.  MRI of the cervical spine negative other than for minimal degenerative changes.  However, per Dr. Grimaldo who had communication with Dr. Rizo, Dr. Rizo was not convinced of these being metastatic.        Lumbar spine tumor    12/18/2019 Initial Diagnosis     Lumbar spine tumor      1/9/2020 - 1/9/2020 Radiation     Radiation OncologyTreatment Course:  Alphonso Evans received 1400 cGy in 1 fractions to L-spine metastatsis via Stereotactic Radiation Therapy - SRT.        Renal cancer, left (CMS/HCC)    12/22/2019 Initial Diagnosis     Renal cancer, left (CMS/HCC)      12/30/2019 Cancer Staged     Staging form:  Kidney, AJCC 8th Edition  - Clinical: Stage IV (cT1b, cN0, pM1) - Signed by Jose Coffey MD on 12/30/2019 1/6/2020 Imaging     MRI of the brain IMPRESSION:  At least 2 sites of right frontotemporal involvement of  abnormal enhancement on postcontrast sequences concerning for  intracranial metastasis as these are not well identified on precontrast  T1 sequences with attention on followup. Mild chronic small vessel  ischemic disease without acute intracranial findings otherwise noted.  However, per discussion with Dr. Grimaldo, the images were reviewed by Dr. Rizo who is not convinced of these being malignant metastases.  MRI cervical spine IMPRESSION:  Minimal degenerative changes with no significant disc  protrusion, spinal stenosis or abnormal cord finding.      1/13/2020 -  Chemotherapy     OP KIDNEY Axitinib / Pembrolizumab         HISTORY OF PRESENT ILLNESS:  The patient is a 54 y.o. male, here for follow up on management of metastatic kidney cancer.  Completion staging with MRI brain suggested brain metastasis but Dr. Rizo is not convinced per my discussion with Dr. Grimaldo this morning.  Finished CyberKnife to the back last week.  Stop steroids per the recommendation of Dr. Rizo but as of this morning is nearly incapacitated by his back pain since cessation of steroids.  Has not been taking pain medicine.      Past Medical History:   Diagnosis Date   • Hypertension      Past Surgical History:   Procedure Laterality Date   • KNEE SURGERY Left     x 3   • ROTATOR CUFF REPAIR Right 2014       No Known Allergies    Family History and Social History reviewed and changed as necessary      REVIEW OF SYSTEM:   Review of Systems   Constitutional: Negative for appetite change, chills, diaphoresis, fatigue, fever and unexpected weight change.   HENT:   Negative for mouth sores, sore throat and trouble swallowing.    Eyes: Negative for icterus.   Respiratory: Negative for cough, hemoptysis and shortness of  "breath.    Cardiovascular: Negative for chest pain, leg swelling and palpitations.   Gastrointestinal: Negative for abdominal distention, abdominal pain, blood in stool, constipation, diarrhea, nausea and vomiting.   Endocrine: Negative for hot flashes.   Genitourinary: Negative for bladder incontinence, difficulty urinating, dysuria, frequency and hematuria.    Musculoskeletal: Negative for gait problem, neck pain and neck stiffness.   Skin: Negative for rash.   Neurological: Negative for dizziness, gait problem, headaches, light-headedness and numbness.   Hematological: Negative for adenopathy. Does not bruise/bleed easily.   Psychiatric/Behavioral: Negative for depression. The patient is not nervous/anxious.    All other systems reviewed and are negative.       PHYSICAL EXAM    Vitals:    01/13/20 0932   BP: (!) 165/103   Pulse: 95   Resp: 18   Temp: 98 °F (36.7 °C)   SpO2: 96%   Height: 167.6 cm (66\")     Constitutional: Appears well-developed and well-nourished. No distress.   ECOG: (1) Restricted in physically strenuous activity, ambulatory and able to do work of light nature  HENT:   Head: Normocephalic.   Mouth/Throat: Oropharynx is clear and moist.   Eyes: Conjunctivae are normal. Pupils are equal, round, and reactive to light. No scleral icterus.   Neck: Neck supple. No JVD present. No thyromegaly present.   Cardiovascular: Normal rate, regular rhythm and normal heart sounds.    Pulmonary/Chest: Breath sounds normal. No respiratory distress.   Abdominal: Soft. Exhibits no distension and no mass. There is no hepatosplenomegaly. There is no tenderness. There is no rebound and no guarding.   Musculoskeletal:Exhibits no edema, tenderness or deformity.   Neurological: Alert and oriented to person, place, and time. Exhibits normal muscle tone.   Skin: No ecchymosis, no petechiae and no rash noted. Not diaphoretic. No cyanosis. Nails show no clubbing.   Psychiatric: Normal mood and affect.   Vitals " reviewed.      Lab Results   Component Value Date    HGB 18.8 (H) 01/07/2020    HCT 57.3 (H) 01/07/2020    MCV 90.4 01/07/2020     01/07/2020    WBC 27.00 (H) 01/07/2020    NEUTROABS 25.60 (H) 01/07/2020    LYMPHSABS 0.90 01/07/2020    MONOSABS 0.40 01/07/2020    EOSABS 0.27 12/18/2019    BASOSABS 0.04 12/18/2019       Lab Results   Component Value Date    GLUCOSE 101 (H) 01/07/2020    BUN 32 (H) 01/07/2020    CREATININE 1.23 01/07/2020     (L) 01/07/2020    K 4.4 01/07/2020    CL 92 (L) 01/07/2020    CO2 27.0 01/07/2020    CALCIUM 9.8 01/07/2020    PROTEINTOT 6.2 01/07/2020    ALBUMIN 3.90 01/07/2020    BILITOT 0.6 01/07/2020    ALKPHOS 44 01/07/2020    AST 38 01/07/2020    ALT 58 (H) 01/07/2020                   ASSESSMENT & PLAN:    1. Kidney cancer  2. Biopsy-proven liver metastases  3. Radiographic evidence of bone metastases and lung metastases  4. Probable adrenal metastases   5. Possible brain metastases  6. Hypertension   7. Severe back pain    Discussion: I suspect his hypertension is related to his pain out-of-control.  He will try the steroids again today for a dose or 2 and may be for the next couple of days until hopefully the CyberKnife benefit takes cold.  He has a few more pain medicine tablets left that should last until he sees the palliative care physicians.  Over the long haul I would like him to get off the steroids not so much for protecting his stomach as it is to improve the odds of the Keytruda working.  When his systolic is below 140 and the diastolic in 95 or lower, he will start the axitinib.  He has a good support system around him that can check his blood pressures at home routinely and communicate with Dr. Mccarty.  Dr. Rizo apparently is not convinced of the brain metastases.  From my standpoint it would not make a large difference as I would still consider Keytruda and axitinib.   we will see him back on February 3 for next course of therapy. Discussed with patient  face-to-face 30 minutes greater than 50% spent counseling regarding this plan as outlined.         Jose Coffey MD    01/13/2020

## 2020-01-14 NOTE — PROGRESS NOTES
Oncology Nutrition Screening    Patient Name:  Alphonso Evans  YOB: 1965  MRN: [id number]  Date:  01/14/20  Physician:  Dr. Coffey    Type of Cancer Treatment:   Cyberknife: 1400 cGy - 1 fraction - L2  Chemotherapy:  Axitinib - po bid + Keytruda - IV day 1 - every 21 days     Patient Active Problem List   Diagnosis   • Radiculopathy   • Hypertension   • Lumbar spine tumor   • Intractable back pain   • Renal cancer, left (CMS/HCC)   • Sinus tachycardia       Current Outpatient Medications   Medication Sig Dispense Refill   • axitinib (INLYTA) 5 MG chemo tablet Take 1 tablet by mouth Every 12 (Twelve) Hours. 60 tablet 5   • cyclobenzaprine (FLEXERIL) 10 MG tablet Take 1 tablet by mouth Every 8 (Eight) Hours. 90 tablet 0   • dexamethasone (DECADRON) 4 MG tablet Take 1 tablet by mouth 2 (Two) Times a Day With Meals. 20 tablet 0   • gabapentin (NEURONTIN) 100 MG capsule Take 1 capsule by mouth 3 (Three) Times a Day. 90 capsule 3   • hydroCHLOROthiazide (HYDRODIURIL) 25 MG tablet Take 1 tablet by mouth Daily. 30 tablet 0   • ibuprofen (ADVIL,MOTRIN) 600 MG tablet Take 1 tablet by mouth Every 6 (Six) Hours As Needed for Mild Pain . Avoid while on steroids     • losartan (COZAAR) 50 MG tablet Take 100 mg by mouth Daily.     • metoprolol succinate XL (TOPROL-XL) 50 MG 24 hr tablet      • ondansetron (ZOFRAN) 8 MG tablet Take 1 tablet by mouth 3 (Three) Times a Day As Needed for Nausea or Vomiting. 30 tablet 5   • oxyCODONE-acetaminophen (PERCOCET) 7.5-325 MG per tablet Take 1 tablet by mouth Every 4 (Four) Hours As Needed for Moderate Pain  for up to 48 doses. 37 tablet 0   • pantoprazole (PROTONIX) 40 MG EC tablet Take 1 tablet by mouth Daily. 30 tablet 0   • polyethylene glycol (MIRALAX) pack packet Take 17 g by mouth Daily As Needed (constipation).     • predniSONE (DELTASONE) 10 MG tablet Take 10 mg by mouth 2 (Two) Times a Day.     • pregabalin (LYRICA) 75 MG capsule Take 1 capsule by mouth Every 12  (Twelve) Hours. 60 capsule 0   • sennosides-docusate (PERICOLACE) 8.6-50 MG per tablet Take 2 tablets by mouth Every Night. 60 tablet 0   • traMADol (ULTRAM) 50 MG tablet        No current facility-administered medications for this visit.        Glycemic Risk:   Yonny    Weight:   Height: 66 inches  Weight: 207 lbs. (1/7/20)  Usual Body Weight: same lbs.   BMI: 33.4  Obese  Weight has been stable    Oral Food Intake:  Regular Diet - No Restrictions  Compared to normal intake, current food intake is the same  Patient is planning on adopting a vegan diet.    Hydration Status:   How many 8 ounce glass of water of fluid do you drink per day?  Patient reports drinking mostly flavored water throughout the day.    Enteral Feeding:   n/a    Nutrition Symptoms:   No Problems with Eating    Activity:   Not my normal self, but able to be up and about with fairly normal activities     reports that he has never smoked. He has never used smokeless tobacco. He reports that he does not drink alcohol or use drugs.    Evaluation of Nutritional Risk:   Patient has been identified to be at potential nutritional risk due to diagnosis and treatment plan.   Met with patient and his sister during his initial immunotherapy infusion appointment.  Patient states he is planning on adopting a vegan diet.  He states he makes homemade smoothies with protein powder to aid with calorie and protein intake.    Discussed the importance of good nutrition during his treatment course focusing on adequate calorie, protein, nutrient and fluid intake.  Advised him to be consuming smaller more frequent meals/snacks throughout the day to aid with potential nausea management.  Emphasized the importance of protein and its role in the diet; reviewed high protein vegan foods; and recommended he have a protein source at each meal/snack.  Also emphasized the importance of hydration; reviewed good hydrating fluid options; and recommended he drink at least 64  ounces daily.  Discussed nutritional supplements and their role in the diet and encouraged him to drink those as needed.    Answered their questions and both voiced understanding of information discussed.  RD's contact information provided and encouraged to call with questions.  Will monitor as needed during his treatment course.    Electronically signed by:  Raissa Saenz RD  1:41 PM

## 2020-01-15 NOTE — OUTREACH NOTE
Medical Week 3 Survey      Responses   Facility patient discharged from?  [facility]   Does the patient have one of the following disease processes/diagnoses(primary or secondary)?  Other   Week 3 attempt successful?  Yes   Call start time  1618   Call end time  1619   General alerts for this patient  Pt is staying with sister Felecia   Discharge diagnosis  New Dx of Metastatic Cancer (path pending)   Person spoke with today (if not patient) and relationship  Felecia-sister   Meds reviewed with patient/caregiver?  Yes   Is the patient taking all medications as directed (includes completed medication regime)?  Yes   Medication comments  Pt started chemo 1/14/20   Has the patient kept scheduled appointments due by today?  Yes   What is the patient's perception of their health status since discharge?  Improving   Week 3 Call Completed?  Yes          Romelia Villagomez RN

## 2020-01-20 NOTE — TELEPHONE ENCOUNTER
----- Message from Jose Coffey MD sent at 1/20/2020  2:08 PM EST -----  Regarding: RE: Medication refill  Refill till palliative sees  ----- Message -----  From: Rafia Deluca MA  Sent: 1/20/2020  10:46 AM EST  To: Jose Coffey MD  Subject: Medication refill                                PT is requesting a refill on dexamethasone (DECADRON) 4 MG tablet for his pain.  Please advise.    Thank you,     [place]

## 2020-01-20 NOTE — TELEPHONE ENCOUNTER
I had the opportunity to review the med list and/or validate the medication prescribed by Dr Coffey  Medication: dexamethasone (DECADRON) 4 MG tablet      Dose: 4mg    How does the patient take the medication? oral    Pharmacy Type (local, mail order, specialty): local  Pharmacy Name: [facility]

## 2020-01-21 PROBLEM — R06.09 DYSPNEA ON EXERTION: Status: ACTIVE | Noted: 2020-01-01

## 2020-01-21 NOTE — PROGRESS NOTES
Pt presents to clinic today for initial visit with sister and niece. Pt ambulatory, vss, a&ox4, and appropriate.  Pt oriented to palliative care, prescribing practices, med counts, contact information and medication disposal agreement. Pt's main complaint is of back pain that radiates down into his leg causing numbness.  Pt has tried percocet, decadron, ibuprofen, gabapentin and been to the chiropractor and done realighnments, whirlpool therapy, and a few other interventions. NO constipation, decreased appetite, insomnia, nausea/vomiting.     Med Counts  Did not bring medication with them - first visit. Educated on bringing each visit.

## 2020-01-21 NOTE — PROGRESS NOTES
"    Subjective   Alphonso Evans is a 54 y.o. male.     History of Present Illness   Referring/Oncology:  Jose Coffey MD and Tila GALVEZ (referral placed 12/30/19)  Primary Care:  Juan Mccarty MD    54yowm with stage IV renal cell carcinoma metastatic to mediastinal LNs, lung nodules, rib metastases, liver, bilateral adrenal glands, and brain (R frontotemporal), L2 with extension to paraspinal soft tissues and posterior extradural spinal canal with compression of thecal sac at L2  Dx 12/19/19 after persistent and rapidly escalating back pain that he contributed to MSK pains related to horse riding.  S/p SRT to L spine metastasis 1/9/20.    Treatment plan:  Axitinib, Pembro    Pain: low mid back continuous throbbing ache with numbness of R leg.  Impacted his ambulation.    Medication management:  Decadron, Ibuprofen 800mg TID,  Tramadol 100mg TID - takes 100mg at bedtime, occasionally takes 50-100mg in morning.  Percocet 7.5/325mg PRN - takes rarely due to drowsiness and \"loopy\".  Gabapentin 100mg TID but not sure it is helping anything.    Symptoms: Increasing dyspnea with exertion.  Winded with getting out of car or more quickly with gym exercises.  Denies orthopnea, wheeze.    Function: Independent of ADLs.  No longer driving due to pain, medication side effect concerns.    Support/Strengths:  Currently staying with his sister in [place].  She is APRN hospitalist here.  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  Practices mindfulness meditation daily.  Healthy athlete, healthy diet, and vitamin supplements.    Distress/Difficulties: None expressed today. Note that he is resistant to any daily medication regimen.  Noted family history of early CAD, father with MI in his 50s.    Substance Use History: never use    ACP/Goals: pain management when off steroids (must stop for Pembro efficacy)    The following portions of the patient's history were " reviewed and updated as appropriate: allergies, current medications, past family history, past medical history, past social history, past surgical history and problem list.    Review of Systems  Otherwise negative except as below and as already detailed in HPI.    RAFAL:  Reviewed.  See scanned form in Media. No concerns.  Consistent with history.  Prescribers identified as members of care team.     Medication Counts:  Reviewed.  See RN note. Did not bring medication to appointment    CONTROLLED SUBSTANCE TRACKING 1/21/2020   Last Rafal 1/20/2020   Report Number [id number]   ORT Initial Risk Score 1   Pill count Did not bring   Disposal Education and Agreement [id number]       UDS:  THC, Screen, Urine   Date Value Ref Range Status   01/21/2020 Negative Negative Final     Phencyclidine (PCP), Urine   Date Value Ref Range Status   01/21/2020 Negative Negative Final     Cocaine Screen, Urine   Date Value Ref Range Status   01/21/2020 Negative Negative Final     Methamphetamine, Ur   Date Value Ref Range Status   01/21/2020 Negative Negative Final     Opiate Screen   Date Value Ref Range Status   01/21/2020 Negative Negative Final     Amphetamine Screen, Urine   Date Value Ref Range Status   01/21/2020 Negative Negative Final     Benzodiazepine Screen, Urine   Date Value Ref Range Status   01/21/2020 Negative Negative Final     Tricyclic Antidepressants Screen   Date Value Ref Range Status   01/21/2020 Negative Negative Final     Methadone Screen, Urine   Date Value Ref Range Status   01/21/2020 Negative Negative Final     Barbiturates Screen, Urine   Date Value Ref Range Status   01/21/2020 Negative Negative Final     Oxycodone Screen, Urine   Date Value Ref Range Status   01/21/2020 Negative Negative Final     Propoxyphene Screen   Date Value Ref Range Status   01/21/2020 Negative Negative Final     Buprenorphine, Screen, Urine   Date Value Ref Range Status   01/21/2020 Negative Negative Final     Palliative Performance  Scale  Palliative Performance Scale Score: 80%    [place] Symptom Assessment System Revised  Pain Score: 8   ESAS Tiredness Score: 7  ESAS Nausea Score: No nausea  ESAS Depression Score: No depression  ESAS Anxiety Score: No anxiety  ESAS Drowsiness Score: 3  ESAS Lack of Appetite Score: 5  ESAS Wellbeing Score: 5  ESAS Dyspnea Score: 8  ESAS Source of Information: patient    BROWN-7:    Over the last two weeks, how often have you been bothered by the following problems?  Feeling nervous, anxious or on edge: More than half the days  Not being able to stop or control worrying: Several days  Worrying too much about different things: Several days  Trouble Relaxing: Several days  Being so restless that it is hard to sit still: Several days  Becoming easily annoyed or irritable: More than half the days  Feeling afraid as if something awful might happen: Not at all  BROWN 7 Total Score: 8    PHQ-9:  PHQ-2/PHQ-9 Depression Screening 1/21/2020   Little interest or pleasure in doing things 0   Feeling down, depressed, or hopeless 0   Trouble falling or staying asleep, or sleeping too much 2   Feeling tired or having little energy 2   Poor appetite or overeating 1   Feeling bad about yourself - or that you are a failure or have let yourself or your family down 0   Trouble concentrating on things, such as reading the newspaper or watching television 0   Moving or speaking so slowly that other people could have noticed. Or the opposite - being so fidgety or restless that you have been moving around a lot more than usual 0   Thoughts that you would be better off dead, or of hurting yourself in some way 0   Total Score 5        ECOG: (1) Restricted in physically strenuous activity, ambulatory and able to do work of light nature    Objective   Physical Exam   Constitutional: He is oriented to person, place, and time. He appears well-developed and well-nourished. No distress.   Very muscular   Eyes: Pupils are equal, round, and  reactive to light. EOM are normal.   Cardiovascular: Normal rate, regular rhythm and normal heart sounds. Exam reveals no gallop and no friction rub.   No murmur heard.  Pulmonary/Chest: Effort normal and breath sounds normal. No stridor. No respiratory distress. He has no wheezes. He has no rales.   Abdominal: Soft. Bowel sounds are normal. He exhibits no distension. There is no tenderness.   Musculoskeletal: He exhibits edema and tenderness. He exhibits no deformity.        Lumbar back: He exhibits bony tenderness. He exhibits normal range of motion, no pain and no spasm.   Neurological: He is alert and oriented to person, place, and time. He exhibits normal muscle tone. Coordination normal.   Skin: Skin is warm and dry. No rash noted. He is not diaphoretic. No erythema. No pallor.   Psychiatric: He has a normal mood and affect. His behavior is normal. Judgment and thought content normal.   Nursing note and vitals reviewed.        Assessment/Plan   Alphonso was seen today for appointment and follow-up.    Diagnoses and all orders for this visit:    Cancer associated pain  -     Ambulatory Referral to Pain Management  -     traMADol (ULTRAM) 50 MG tablet; Take 2 tablets by mouth 3 (Three) Times a Day for 30 days.    Renal cancer, left (CMS/HCC)  -     gabapentin (NEURONTIN) 100 MG capsule; Take 2 capsules by mouth 3 (Three) Times a Day for 30 days.    Dyspnea on exertion    Lumbar radiculopathy    Lumbar spine tumor    Other orders  -     diclofenac (VOLTAREN) 3 % gel gel; Apply  topically to the appropriate area as directed 2 (Two) Times a Day for 60 doses.               Universal precautions:    ORT risk:  Low risk  Aberrant behavior:  None.  UDT and PDMP as expected  Indication:  Cancer inflammatory bone pain and radiculopathy of L2 and L4 malignant process  OME:  30mg  Naloxone prescribed:  no     Advance care plan:    -  Healthcare decision making plan: sister listed as emergency contact  -  MOST discussions thus  far:  Not explored at initial symptom management visit    Total face to face time spent:  45 min.  Greater than 50% time spent in counseling and discussion re:  - increase Gabapentin to 200mg TID, room to increase further  - instructed him to schedule Tramadol 100mg TID.  Can take Percocet HS for unmanaged pain and call if pain not managed with that.    Patient was counseled on narcotic safety and patient responsibility of medication against theft or stolen medications.  Controlled medication agreement reviewed, signed, and in chart.  Low risk of non-medical use, non-compliance, or diversion.  Authorized that any [facility] healthcare team member may instruct him on urgent adjustments or prescribe new controlled medications in safe coordinated fashion with shared EHR, although encouraged to maintain communication with palliative team.  1)  No early refills requests will be honored for lost or stolen medication.    2)  Patient is expected to comply with requests for random medication counts and urine drug monitoring while receiving opioid therapy.    Patient was counseled to take medications only as prescribed or instructed by prescribing physician.    1)  Patient was counseled regarding risk of overdose and polypharmacy.    2)  Patient was given instruction on safe disposal of medications.    Patient was advised of opioid side effects, including, but not limited to:   -  Physical dependence and opioid tolerance, related to duration of opioid therapy   -  Opioid induced hyperalgesia, related to duration of opioid therapy   -  Opioid use disorder (drug abuse and addiction)   -  hypogonadism and fatigue,    -  Sleep apnea,   -  osteoporosis,    -  depression,    -  Increased risk of pneumonia   - Constipation   - Altered sensorium - confusion and/or sedation   - Nausea   - Pruritis, itching   - Hyperhidrosis, excessive sweating     Advised to be supervised for first few days while on new medication or dosages and to call for  any side effect concerns.    Care coordination:   -  Discussed his DUGAN and family history with Tila Nickerson.  She will follow up re: possible cardiology w/u.    -  Urgent referral to Dr. Lubin for advanced cancer pain interventional options  -  Refilled Tramadol   -  Follow up 3 weeks or PRN sooner

## 2020-01-22 PROBLEM — M48.062 LUMBAR STENOSIS WITH NEUROGENIC CLAUDICATION: Status: ACTIVE | Noted: 2020-01-01

## 2020-01-22 PROBLEM — G89.3 CANCER RELATED PAIN: Status: ACTIVE | Noted: 2020-01-01

## 2020-01-22 NOTE — PROGRESS NOTES
Oral Chemotherapy Teaching      Patient Name/:  Alphonso Evans   1965  Oral Chemotherapy Regimen:  Axitinib 5mg PO BID + Pembrolizumab IV on Day 1 of a 21 Day cycle  Date Started Medication: Cycle 1: 20 of Pembrolizumab IV (axitinib held); began axitinib on 2020    Additional Notes:  Spoke with patient this morning regarding the cessation of his dexamethasone 4mg BID. Patient states that he has done well with the new pain medications he received yesterday (tramadol and gabapentin) from the palliative team and has had no signs/symptoms concerning for steroid withdrawal. In addition, axitinib was held previously on the basis of elevated BP. Upon interviewing the patient concerning his BP, he states that last night (pm) his BP was 140/89 and he has been taking his axitinib. Per patient, on Friday, 2020, his BP was below 140/90 and his PCP, Juan Springer, instructed him to begin his axitinib on Monday, 2020. Patient has the proper contact information should additional questions arise.

## 2020-01-22 NOTE — PROGRESS NOTES
[facility]    Subjective:     Encounter Date:01/23/2020     Patient ID: Alphonso Evans is a  54 y.o.  male.  Referring provider: LENNY Glover     Reason for consultation:   Chief Complaint   Patient presents with   • Hypertension     Consult      Problem List:  1. Hypertension  2. Sinus Tachycardia  3. Dyspnea on Exertion  4. Renal Cancer with metastases to liver, bone, lung, and possible brain and probable adrenal   5. Chronic Pain    No Known Allergies      Current Outpatient Medications:   •  axitinib (INLYTA) 5 MG chemo tablet, Take 1 tablet by mouth Every 12 (Twelve) Hours., Disp: 60 tablet, Rfl: 5  •  diclofenac (VOLTAREN) 3 % gel gel, Apply  topically to the appropriate area as directed 2 (Two) Times a Day for 60 doses., Disp: 1 tube, Rfl: 0  •  docusate sodium (COLACE) 100 MG capsule, Take 100 mg by mouth 2 (Two) Times a Day As Needed for Constipation., Disp: , Rfl:   •  doxazosin (CARDURA) 4 MG tablet, Take 2 tablets by mouth Every Night., Disp: , Rfl:   •  gabapentin (NEURONTIN) 100 MG capsule, Take 2 capsules by mouth 3 (Three) Times a Day for 30 days., Disp: 180 capsule, Rfl: 0  •  hydroCHLOROthiazide (HYDRODIURIL) 25 MG tablet, Take 1 tablet by mouth Daily., Disp: 30 tablet, Rfl: 0  •  ibuprofen (ADVIL,MOTRIN) 600 MG tablet, Take 1 tablet by mouth Every 6 (Six) Hours As Needed for Mild Pain . Avoid while on steroids, Disp: , Rfl:   •  losartan (COZAAR) 50 MG tablet, Take 100 mg by mouth Daily., Disp: , Rfl:   •  metoprolol succinate XL (TOPROL-XL) 50 MG 24 hr tablet, 100 mg Daily., Disp: , Rfl:   •  ondansetron ODT (ZOFRAN-ODT) 8 MG disintegrating tablet, Take 8 mg by mouth Every 8 (Eight) Hours As Needed for Nausea or Vomiting., Disp: , Rfl:   •  pantoprazole (PROTONIX) 40 MG EC tablet, Take 1 tablet by mouth Daily., Disp: 30 tablet, Rfl: 0  •  Pembrolizumab (KEYTRUDA IV), Infuse  into a venous catheter Every 21 (Twenty-One) Days.,  Disp: , Rfl:   •  traMADol (ULTRAM) 50 MG tablet, Take 2 tablets by mouth 3 (Three) Times a Day for 30 days., Disp: 180 tablet, Rfl: 0    HPI:      Alphonso Evans is a 54 y.o. male who is referred to my office by LENNY Billy, to establish care for his hypertension.  His blood pressure has been elevated and now with the initiation of axitinib for his renal cell carcinoma his blood pressure has been more difficult to control.  He did he denies dyspnea at rest but does have dyspnea with exertional activities and becomes more dyspneic now than he used to walking across parking lots.  He lifts weights intermittently but mostly just to get out of the house.  He does not do any aerobic exercise.  He denies chest pain and lower extremity edema.    Cardiac Risk Factors: Notable for hypertension.  His cholesterol status is unknown.  He has a history of a myocardial infarction in his father in his late 50s.  The patient has never smoked.  He does not have diabetes.    Social History     Socioeconomic History   • Marital status:      Spouse name: Not on file   • Number of children: Not on file   • Years of education: Not on file   • Highest education level: Not on file   Tobacco Use   • Smoking status: Never Smoker   • Smokeless tobacco: Never Used   Substance and Sexual Activity   • Alcohol use: Never     Frequency: Never   • Drug use: Never   • Sexual activity: Defer   Social History Narrative    Lives in [place]. 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      Family History   Problem Relation Age of Onset   • Cancer Father    • Heart disease Father        Review of Systems:  The following portions of the patient's history were reviewed and updated as appropriate: allergies, current medications and problem list.    Constitution: Negative for chills, fatigue, fever  Cardiovascular: Negative for chest pain, claudication, dyspnea on exertion, leg swelling, orthopnea, palpitations,  paroxysmal nocturnal dyspnea and syncope.   Respiratory: Negative for cough and wheezing.  HENT: Negative for ear pain, nosebleeds, and tinnitus.  Gastrointestinal: Negative for abdominal pain, constipation, diarrhea, nausea and vomiting.   Genitourinary: No urinary symptoms   Musculoskeletal: Negative for muscle cramps.  Neurological: Negative for dizziness, headaches, loss of balance, numbness, and symptoms of stroke.  Psychiatric: Normal mental status.           Objective:     Vitals:    01/23/20 1524   BP: 140/90   Pulse: 86       General Appearance:    Alert, cooperative, in no acute distress   Head:    Normocephalic, without obvious abnormality, atraumatic   Eyes:            Lids and lashes normal, conjunctivae and sclerae normal, no   icterus, no pallor, corneas clear, PERRLA   Ears:    Ears appear intact with no abnormalities noted   Throat:   No oral lesions, no thrush, oral mucosa moist   Neck:   No adenopathy, supple, trachea midline, no thyromegaly, no   carotid bruit, no JVD   Back:     No kyphosis present, no scoliosis present                   Lungs:     Clear to auscultation,respirations regular, even and                  unlabored    Heart:    Regular rhythm and normal rate, normal S1 and S2, no            murmur, no gallop, no rub, no click   Chest Wall:    No abnormalities observed   Abdomen:     Normal bowel sounds, no masses, no organomegaly, soft        non-tender, non-distended, no guarding, no rebound                tenderness   Rectal:     Deferred   Extremities:   Moves all extremities well, no edema, no cyanosis, no             redness   Pulses:   Pulses palpable and equal bilaterally   Skin:   No bleeding, bruising or rash   Lymph nodes:   No palpable adenopathy   Neurologic:   Cranial nerves 2 - 12 grossly intact     Diagnostic Data:    Lab Results   Component Value Date    GLUCOSE 101 (H) 01/07/2020    BUN 32 (H) 01/07/2020    CREATININE 1.23 01/07/2020    EGFRIFNONA 61 01/07/2020     BCR 26.0 (H) 01/07/2020    K 4.4 01/07/2020    CO2 27.0 01/07/2020    CALCIUM 9.8 01/07/2020    PROTENTOTREF 5.9 (L) 12/19/2019    ALBUMIN 3.90 01/07/2020    LABIL2 1.1 12/19/2019    AST 38 01/07/2020    ALT 58 (H) 01/07/2020     Lab Results   Component Value Date    WBC 27.00 (H) 01/07/2020    HGB 18.8 (H) 01/07/2020    HCT 57.3 (H) 01/07/2020    MCV 90.4 01/07/2020     01/07/2020     Lab Results   Component Value Date    INR 1.03 12/18/2019    PROTIME 13.0 12/18/2019      Lab on 01/21/2020   Component Date Value Ref Range Status   • THC, Screen, Urine 01/21/2020 Negative  Negative Final   • Phencyclidine (PCP), Urine 01/21/2020 Negative  Negative Final   • Cocaine Screen, Urine 01/21/2020 Negative  Negative Final   • Methamphetamine, Ur 01/21/2020 Negative  Negative Final   • Opiate Screen 01/21/2020 Negative  Negative Final   • Amphetamine Screen, Urine 01/21/2020 Negative  Negative Final   • Benzodiazepine Screen, Urine 01/21/2020 Negative  Negative Final   • Tricyclic Antidepressants Screen 01/21/2020 Negative  Negative Final   • Methadone Screen, Urine 01/21/2020 Negative  Negative Final   • Barbiturates Screen, Urine 01/21/2020 Negative  Negative Final   • Oxycodone Screen, Urine 01/21/2020 Negative  Negative Final   • Propoxyphene Screen 01/21/2020 Negative  Negative Final   • Buprenorphine, Screen, Urine 01/21/2020 Negative  Negative Final         ECG 12 Lead  Date/Time: 1/23/2020 3:28 PM  Performed by: Mary Acuña MD  Authorized by: Mary Acuña MD   Previous ECG: no previous ECG available  Rhythm: sinus rhythm  BPM: 86  Comments: Q waves in 3 and aVF with associated T wave inversions suggestive of previous inferior MI and peaked T waves                Assessment:       ICD-10-CM ICD-9-CM   1. Essential hypertension I10 401.9   2. Dyspnea on exertion R06.09 786.09   3. Sinus tachycardia R00.0 427.89   4. Shortness of breath R06.02 786.05            Plan:     1. Echocardiogram  for the patient's abnormal EKG and dyspnea  2. Increase doxazosin to 16 mg nightly  3. Change metoprolol to labetalol next week if at follow-up the blood pressure remains elevated.  4. Follow-up in 1 week at which time the patient is to bring his blood pressure cuff, sooner as needed.      Mary Acuña MD, FACC

## 2020-01-23 NOTE — TELEPHONE ENCOUNTER
Pt with drowsiness with increase in Gabapentin from 100mg TID to 200mg dose.  He has decreased dose back down to 100mg BID (as he was actually taking but was not as was reported during appt).  He is taking the Tramadol 100mg TID with improved pain and no drowsiness.    He missed his appt with Dr. Lubin due to treatment related diarrhea this morning.  He will  Immodium OTC.  He has rescheduled appt with Dr. Lubin.

## 2020-01-23 NOTE — OUTREACH NOTE
Medical Week 4 Survey      Responses   Facility patient discharged from?  [facility]   Does the patient have one of the following disease processes/diagnoses(primary or secondary)?  Other   Week 4 attempt successful?  Yes   Call start time  0747   Call end time  0751   Discharge diagnosis  New Dx of Metastatic Cancer (path pending)   Is patient permission given to speak with other caregiver?  Yes   List who call center can speak with  yolanda Otero reviewed with patient/caregiver?  Yes   Is the patient taking all medications as directed (includes completed medication regime)?  Yes   Medication comments  Pt started chemo 1/14/20   Has the patient kept scheduled appointments due by today?  Yes   Is the patient still receiving Home Health Services?  N/A   What is the patient's perception of their health status since discharge?  Improving   Week 4 Call Completed?  Yes   Would the patient like one additional call?  No   Graduated  Yes   Did the patient feel the follow up calls were helpful during their recovery period?  Yes   Was the number of calls appropriate?  Yes   Wrap up additional comments  Pt tolerating chemo well. No questions. Has meds and taking as directed.  Biopsy revealed renal cell carcinoma.           Velia Mason, RN

## 2020-01-23 NOTE — TELEPHONE ENCOUNTER
Returned call to Felecia Sandoval, patient's sister.  Left voicemail message.  Noted patient had appt earlier this morning with Dr. Lubin.

## 2020-01-24 PROBLEM — R50.9 FEVER: Status: ACTIVE | Noted: 2020-01-01

## 2020-01-24 NOTE — PROGRESS NOTES
ONCOLOGY URGENT CARE CLINIC VISIT    Alphonso Evans  [id number]  1965    Chief Complaint:  fever    History of present illness:  Alphonso Evans is a 54 y.o. year old male who is currently undergoing treatment for metastatic renal cell carcinoma with Keytruda and axitinib.  His sister called triage line today with complaint of fever.  He was given an appointment in the oncology urgent care clinic for further evaluation and management.       Patient with complaints of fever.  He states he woke up at 0630 this morning and temperature was 101.2.  He had associated chills, fatigue and body aches.  He took ibuprofen with resolution.  He states he probably had a fever the night before but did not check his temperature.  He had several episodes of diarrhea yesterday but it resolved without intervention.  He denies nausea, vomiting, dysuria, cough, abdominal pain, dizziness, chest pain or any other acute symptoms.  He did have a flu shot and denies any known sick contacts.      Of note, patient received cycle #1 of Keytruda on 1/13/2020.  He was delayed in starting his axitinib due to hypertension.  He took his first dose on 1/20/2020.  He is following with Dr. Acuña for management of hypertension.  He does report ongoing dyspnea with exertion that he states is not any worse.      Oncology History:    Oncology/Hematology History    1. Kidney cancer  2. Biopsy-proven liver metastases  3. Radiographic evidence of bone metastases and lung metastases  4. Probable adrenal metastases    Oncology timeline:  -12/18/2019 presented to emergency room with back pain.  Started while riding horses 12/11/2019 but began suddenly.  Went to chiropractor with no results.  Went to  emergency room on 12/14/2018 due to the pain radiating down into his lower extremities and they gave him steroids and a muscle relaxer.  12/18/2019 MRI emergency room showed L4 osseous tumor with extension into the paraspinal soft tissues as  well as posterior extradural spinal canal with severe compression of the thecal sac at L2.  CTs of chest abdomen pelvis revealed mediastinal adenopathy, multiple lung nodules concerning for metastasis, and likely rib metastases.  There are abnormalities in the liver consistent with metastasis and a 6.3 cm left renal mass likely the primary as well as a left adrenal mass and involvement of the right adrenal gland.  MRI of lumbar and thoracic spine revealed multiple degenerative changes.  Saw Dr. Rizo who did not recommend surgery and they recommended CyberKnife radiation for which Dr. Madrigal evaluated 12/18/2019.  -12/20/2019 CT-guided liver mass biopsy suggestive of atypical non-clear-cell renal cell carcinoma sent to [facility] for further consultation.  Favor white count of 15,000, CBC and CMP unremarkable.  PSA normal at 2.69.  Per Dr. Pruitt this could be a collecting duct carcinoma or another rare variant of a kidney cancer.    If this is a collecting duct carcinoma, this would be a very aggressive subtype of renal cell carcinoma.  Patients with a fumarate hydratase genomic alteration might benefit from M tor inhibitors particularly if there is in an NF2 alteration.  [id number] suggests cisplatin resistance.  CDKN2A is seen in about 62% of these.  Prospective trials are limited in number and number of patients but older data suggests benefit of cisplatin gemcitabine while newer data casts doubt on such.  There are also conflicting reports of response and lack thereof to targeted therapy such as tyrosine kinase inhibitors with sorafenib and sunitinib and Cabozantanib having variable responses between 30 to 50%.  PDL 1 expression is highly variable and immunotherapy while not universally helpful may have a role.  There is a clinical trial looking at gemcitabine cisplatin plus up Avastin (GETA UG-AF u24) as well as cabozantinib (Bonsai trial).  In the meantime, I will prepare for high risk kidney cancer  standard therapy with Keytruda axitinib while waiting on the above molecular testing and data [facility].  We will get an MRI of his brain as well as he has some dizziness and we need completion staging.  I have contacted radiation oncology to have them follow-up for spine radiation as his right leg is getting more numb.  We will also get in with our palliative care clinic for pain management.  We will also get an MRI of his neck with numbness now happening in his left arm.    -1/6/2020 MRI of the brain shows at least 2 sites of right frontotemporal involvement of abnormal enhancement concerning for metastatic disease.  MRI of the cervical spine negative other than for minimal degenerative changes.  However, per Dr. Grimaldo who had communication with Dr. Rizo, Dr. Rizo was not convinced of these being metastatic.        Lumbar spine tumor    12/18/2019 Initial Diagnosis     Lumbar spine tumor      1/9/2020 - 1/9/2020 Radiation     Radiation OncologyTreatment Course:  Alphonso Evans received 1400 cGy in 1 fractions to L-spine metastatsis via Stereotactic Radiation Therapy - SRT.        Renal cancer, left (CMS/HCC)    12/22/2019 Initial Diagnosis     Renal cancer, left (CMS/HCC)      12/30/2019 Cancer Staged     Staging form: Kidney, AJCC 8th Edition  - Clinical: Stage IV (cT1b, cN0, pM1) - Signed by Jose Coffey MD on 12/30/2019 1/6/2020 Imaging     MRI of the brain IMPRESSION:  At least 2 sites of right frontotemporal involvement of  abnormal enhancement on postcontrast sequences concerning for  intracranial metastasis as these are not well identified on precontrast  T1 sequences with attention on followup. Mild chronic small vessel  ischemic disease without acute intracranial findings otherwise noted.  However, per discussion with Dr. Grimaldo, the images were reviewed by Dr. Rizo who is not convinced of these being malignant metastases.  MRI cervical spine  "IMPRESSION:  Minimal degenerative changes with no significant disc  protrusion, spinal stenosis or abnormal cord finding.      1/13/2020 -  Chemotherapy     OP KIDNEY Axitinib / Pembrolizumab         Past Medical History:   Diagnosis Date   • Hypertension        Past Surgical History:   Procedure Laterality Date   • KNEE SURGERY Left     x 3   • ROTATOR CUFF REPAIR Right 2014       Family History   Problem Relation Age of Onset   • Cancer Father    • Heart disease Father        Past medical history, social history and family history was reviewed.    Medications: The current medication list was reviewed and reconciled.     Allergies:  has No Known Allergies.    Review of Systems:    Review of Systems   Constitutional: Positive for fatigue and fever. Negative for appetite change and unexpected weight change.   HENT: Negative for mouth sores, sore throat and trouble swallowing.    Respiratory: Negative for cough, shortness of breath and wheezing.    Cardiovascular: Negative for chest pain, palpitations and leg swelling.   Gastrointestinal: Positive for diarrhea (resolved). Negative for abdominal distention, abdominal pain, constipation, nausea and vomiting.   Genitourinary: Negative for difficulty urinating, dysuria and frequency.   Musculoskeletal: Negative for arthralgias.   Skin: Negative for pallor, rash and wound.   Neurological: Negative for dizziness and weakness.   Hematological: Does not bruise/bleed easily.   Psychiatric/Behavioral: Negative for confusion and sleep disturbance. The patient is not nervous/anxious.        PHYSICAL EXAM:    Vitals:    01/24/20 1038   BP: 107/72   Pulse: 89   Resp: 16   Temp: 97.4 °F (36.3 °C)   TempSrc: Temporal   SpO2: 98%   Weight: 89.4 kg (197 lb)   Height: 167.6 cm (66\")   PainSc: 0-No pain       ECOG: (1) Restricted in physically strenuous activity, ambulatory and able to do work of light nature  General: well appearing male in no acute distress  HEENT: sclerae anicteric, " oropharynx clear  Lymphatics: no cervical, supraclavicular, inguinal, or axillary adenopathy  Neck: Supple. No thyromegaly.  Cardiovascular: regular rate and rhythm, no murmurs  Lungs: clear to auscultation bilaterally. No respiratory distress  Abdomen: soft, nontender, nondistended.  No palpable organomegaly  Extremities: no lower extremity edema, cyanosis, or clubbing  Skin: no rashes, lesions, bruising, or petechiae  Neuro: Alert and oriented x3. Moves all extremities.    Lab Results   Component Value Date    HGB 16.7 01/24/2020    HCT 49.2 01/24/2020    MCV 89.1 01/24/2020     (L) 01/24/2020    WBC 10.00 01/24/2020    NEUTROABS 8.60 (H) 01/24/2020    LYMPHSABS 1.00 01/24/2020    MONOSABS 0.40 01/24/2020    EOSABS 0.27 12/18/2019    BASOSABS 0.04 12/18/2019     Lab Results   Component Value Date    GLUCOSE 145 (H) 01/24/2020    BUN 23 (H) 01/24/2020    CREATININE 1.28 (H) 01/24/2020     01/24/2020    K 3.8 01/24/2020    CL 94 (L) 01/24/2020    CO2 32.0 (H) 01/24/2020    CALCIUM 8.9 01/24/2020    PROTEINTOT 5.9 (L) 01/24/2020    ALBUMIN 3.30 (L) 01/24/2020    BILITOT 0.5 01/24/2020    ALKPHOS 44 01/24/2020    AST 28 01/24/2020    ALT 54 (H) 01/24/2020         Assessment and Plan:    1.  Metastatic renal cell carcinoma\  2.  Fever of unknown origin    Discussion: Patient presents with fever, fatigue and body aches.  He is currently receiving Keytruda and axitinib for metastatic renal cell carcinoma.  Labs obtained and reviewed.  CBC showed WBC 10.00, hemoglobin 16.7, hematocrit 49.2, platelets 136 and ANC 8600.  CMP stable from previous.  Rapid influenza negative.  Clinical exam unremarkable.  We will hold off on antibiotics at this time since patient is not neutropenic and no signs or symptoms of infection noted.  I did instruct patient to continue to monitor temperature and notify us if any new symptoms and/or symptoms worsen or do not improve.  Otherwise, he will continue with supportive care and  return on 2/3/2020 for next dose of Keytruda.  Plan discussed with patient and he verbalized an understanding.        Trinh Alvarenga, APRN    1/24/2020

## 2020-01-24 NOTE — TELEPHONE ENCOUNTER
Received call from pts sister through triage line. She reports that pt was finally able to start his new oral chemo on Monday, which was delayed due to HTN. Sister is concerned and is calling to report that the pt had 10 episodes of diarrhea yesterday, which have now resolved- and also has had a fever all night of 101.2 along with fatigue. Discussed with LENNY Tsang. Pt added onto [facility] schedule today with labs.

## 2020-01-30 NOTE — PROGRESS NOTES
[facility] Cardiology    Patient ID: Alphonso Evans is a 54 y.o.  male.  : 1965   Contact: [phone]     Encounter date: 2020    PCP: Juan Mccarty MD      Chief complaint:   Chief Complaint   Patient presents with   • Hypertension       Problem List:  1. Hypertension:  2. Sinus Tachycardia  3. Dyspnea on Exertion:  a. Echo, 2020: EF 50-55%. Mild TR with RVSP 27 mmHg. The global longitudinal strain is calculated at -17 which is slightly less than normal (normal is -20)  4. Renal Cancer with metastases to liver, bone, lung, and possible brain and probable adrenal:  a. Currently undergoing chemo.  5. Chronic Pain    No Known Allergies    Current Medications:      Current Outpatient Medications:   •  axitinib (INLYTA) 5 MG chemo tablet, Take 1 tablet by mouth Every 12 (Twelve) Hours., Disp: 60 tablet, Rfl: 5  •  doxazosin (CARDURA) 8 MG tablet, 2 tabs po daily (Patient taking differently: 8 mg Every Night.), Disp: 180 tablet, Rfl: 3  •  hydroCHLOROthiazide (HYDRODIURIL) 25 MG tablet, Take 1 tablet by mouth Daily., Disp: 30 tablet, Rfl: 0  •  losartan (COZAAR) 50 MG tablet, Take 100 mg by mouth Daily., Disp: , Rfl:   •  metoprolol succinate XL (TOPROL-XL) 50 MG 24 hr tablet, 100 mg Daily., Disp: , Rfl:   •  pantoprazole (PROTONIX) 40 MG EC tablet, Take 1 tablet by mouth Daily., Disp: 30 tablet, Rfl: 0  •  Pembrolizumab (KEYTRUDA IV), Infuse  into a venous catheter Every 21 (Twenty-One) Days., Disp: , Rfl:   •  traMADol (ULTRAM) 50 MG tablet, Take 2 tablets by mouth 3 (Three) Times a Day for 30 days., Disp: 180 tablet, Rfl: 0    HPI    Alphonso Evans is a 54 y.o. male who presents today for one week follow up of hypertension, dyspnea, and sinus tachycardia. Since last visit, he has been feeling well overall from a cardiovascular standpoint. He monitors his blood pressure at home, and reports it has been well-controlled. Pt reports rib pain when  "he breathes in deeply. Pt just got back from the gym, which he feels is why his HR is high in the office. He lifts low weight with high repetitions. Chemotherapy is causing significant fatigue, which has decreased his physical ability. Patient denies chest pain, palpitations, edema, and syncope.        The following portions of the patient's history were reviewed and updated as appropriate: allergies, current medications and problem list.    Pertinent positives as listed in the HPI.  All other systems reviewed are negative.         Vitals:    01/30/20 1121 01/30/20 1134 01/30/20 1135   BP: 104/64 110/78 120/80   BP Location: Left arm Left arm Left arm   Patient Position: Sitting Sitting Sitting   Pulse: 104     SpO2: 95%     Weight: 88.9 kg (196 lb)     Height: 167.6 cm (66\")        Used patient's personal cuff for comparison       Physical Exam:  General: Alert and oriented.  Neck: Jugular venous pressure is within normal limits. Carotids have normal upstrokes without bruits.   Cardiovascular: Heart has a nondisplaced focal PMI. Regular rate and rhythm without murmur, gallop or rub.  Lungs: Clear without rales or wheezes. Equal expansion is noted.   Extremities: Show no edema.  Skin: Warm and dry.  Neurologic: Nonfocal.     Diagnostic Data:  Lab Results   Component Value Date    GLUCOSE 145 (H) 01/24/2020    BUN 23 (H) 01/24/2020    CREATININE 1.28 (H) 01/24/2020    EGFRIFNONA 58 (L) 01/24/2020    BCR 18.0 01/24/2020     01/24/2020    K 3.8 01/24/2020    CL 94 (L) 01/24/2020    CO2 32.0 (H) 01/24/2020    CALCIUM 8.9 01/24/2020    PROTENTOTREF 5.9 (L) 12/19/2019    ALBUMIN 3.30 (L) 01/24/2020    LABIL2 1.1 12/19/2019    AST 28 01/24/2020    ALT 54 (H) 01/24/2020      Lab Results   Component Value Date    WBC 10.00 01/24/2020    HGB 16.7 01/24/2020    HCT 49.2 01/24/2020    MCV 89.1 01/24/2020     (L) 01/24/2020     Lab Results   Component Value Date    TSH 1.920 01/07/2020       Echocardiogram, " 01/29/2020:   · Estimated EF = 50-55%.  · Left ventricular systolic function is low normal.  · Mild tricuspid valve regurgitation is present.  · Calculated right ventricular systolic pressure from tricuspid regurgitation is 27 mmHg.  · The global longitudinal strain is calculated at -17 which is slightly less than normal (normal is -20)    Procedures      Assessment:    ICD-10-CM ICD-9-CM   1. Essential hypertension I10 401.9   2. Dyspnea on exertion R06.09 786.09   3. Sinus tachycardia R00.0 427.89     Lab results and echocardiogram found above were reviewed with the patient.      Plan:  1. Continue doxazosin and losartan for hypertension.  2. Continue metoprolol for BP and rate control.  3. Continue HCT for hypertension and fluid retention.  4. Continue all other current medications.  5. F/up in 6 months, sooner if needed.      Scribed for Mary Acuña MD by Wendy Pineda. 1/30/2020  11:39 AM     I Mary Acuña MD personally performed the services described in this documentation as scribed by the above individual in my presence, and it is both accurate and complete.    Mary Acuña MD, FACC

## 2020-02-03 NOTE — PROGRESS NOTES
CHIEF COMPLAINT: Metastatic kidney cancer    Problem List:  Oncology/Hematology History    1. Kidney cancer  2. Biopsy-proven liver metastases  3. Radiographic evidence of bone metastases and lung metastases  4. Probable adrenal metastases    Oncology timeline:  -12/18/2019 presented to emergency room with back pain.  Started while riding horses 12/11/2019 but began suddenly.  Went to chiropractor with no results.  Went to  emergency room on 12/14/2018 due to the pain radiating down into his lower extremities and they gave him steroids and a muscle relaxer.  12/18/2019 MRI emergency room showed L4 osseous tumor with extension into the paraspinal soft tissues as well as posterior extradural spinal canal with severe compression of the thecal sac at L2.  CTs of chest abdomen pelvis revealed mediastinal adenopathy, multiple lung nodules concerning for metastasis, and likely rib metastases.  There are abnormalities in the liver consistent with metastasis and a 6.3 cm left renal mass likely the primary as well as a left adrenal mass and involvement of the right adrenal gland.  MRI of lumbar and thoracic spine revealed multiple degenerative changes.  Saw Dr. Rizo who did not recommend surgery and they recommended CyberKnife radiation for which Dr. Madrigal evaluated 12/18/2019.  -12/20/2019 CT-guided liver mass biopsy suggestive of atypical non-clear-cell renal cell carcinoma sent to [facility] for further consultation.  Favor white count of 15,000, CBC and CMP unremarkable.  PSA normal at 2.69.  Per Dr. Pruitt this could be a collecting duct carcinoma or another rare variant of a kidney cancer.    If this is a collecting duct carcinoma, this would be a very aggressive subtype of renal cell carcinoma.  Patients with a fumarate hydratase genomic alteration might benefit from M tor inhibitors particularly if there is in an NF2 alteration.  NLT5S99 suggests cisplatin resistance.  CDKN2A is seen in about 62% of these.   Prospective trials are limited in number and number of patients but older data suggests benefit of cisplatin gemcitabine while newer data casts doubt on such.  There are also conflicting reports of response and lack thereof to targeted therapy such as tyrosine kinase inhibitors with sorafenib and sunitinib and Cabozantanib having variable responses between 30 to 50%.  PDL 1 expression is highly variable and immunotherapy while not universally helpful may have a role.  There is a clinical trial looking at gemcitabine cisplatin plus up Avastin (GETA UG-AF u24) as well as cabozantinib (Bonsai trial).  In the meantime, I will prepare for high risk kidney cancer standard therapy with Keytruda axitinib while waiting on the above molecular testing and data [facility].  We will get an MRI of his brain as well as he has some dizziness and we need completion staging.  I have contacted radiation oncology to have them follow-up for spine radiation as his right leg is getting more numb.  We will also get in with our palliative care clinic for pain management.  We will also get an MRI of his neck with numbness now happening in his left arm.    -1/6/2020 MRI of the brain shows at least 2 sites of right frontotemporal involvement of abnormal enhancement concerning for metastatic disease.  MRI of the cervical spine negative other than for minimal degenerative changes.  However, per Dr. Grimaldo who had communication with Dr. Rizo, Dr. Rizo was not convinced of these being metastatic.        Lumbar spine tumor    12/18/2019 Initial Diagnosis     Lumbar spine tumor      1/9/2020 - 1/9/2020 Radiation     Radiation OncologyTreatment Course:  Alphonso Evans received 1400 cGy in 1 fractions to L-spine metastatsis via Stereotactic Radiation Therapy - SRT.        Renal cancer, left (CMS/HCC)    12/22/2019 Initial Diagnosis     Renal cancer, left (CMS/HCC)      12/30/2019 Cancer Staged     Staging form:  Kidney, AJCC 8th Edition  - Clinical: Stage IV (cT1b, cN0, pM1) - Signed by Jose Coffey MD on 12/30/2019 1/6/2020 Imaging     MRI of the brain IMPRESSION:  At least 2 sites of right frontotemporal involvement of  abnormal enhancement on postcontrast sequences concerning for  intracranial metastasis as these are not well identified on precontrast  T1 sequences with attention on followup. Mild chronic small vessel  ischemic disease without acute intracranial findings otherwise noted.  However, per discussion with Dr. Grimaldo, the images were reviewed by Dr. Rizo who is not convinced of these being malignant metastases.  MRI cervical spine IMPRESSION:  Minimal degenerative changes with no significant disc  protrusion, spinal stenosis or abnormal cord finding.      1/13/2020 -  Chemotherapy     OP KIDNEY Axitinib / Pembrolizumab         HISTORY OF PRESENT ILLNESS:  The patient is a 54 y.o. male, here for follow up on management of metastatic kidney cancer.  Tolerating Keytruda and axitinib.  Blood pressure elevated today but he states that it has been better at home.  He checks it several times a day and the highest it has been is 130's over 80's.  He is following with Dr. Acuña who has been adjusting his medications.  He was seen in the oncology [facility] [place] on 1/24/2020 with fevers.  He states he has intermittent night sweats but has not checked his temperature during those episodes to know if he has a fever.          Past Medical History:   Diagnosis Date   • Hypertension      Past Surgical History:   Procedure Laterality Date   • KNEE SURGERY Left     x 3   • ROTATOR CUFF REPAIR Right 2014       No Known Allergies    Family History and Social History reviewed and changed as necessary      REVIEW OF SYSTEM:   Review of Systems   Constitutional: Negative for appetite change, chills, diaphoresis, and unexpected weight change. + fatigue and night sweats  HENT:   Negative for mouth sores, sore throat and trouble  "swallowing.    Eyes: Negative for icterus.   Respiratory: Negative for cough, hemoptysis and shortness of breath.    Cardiovascular: Negative for chest pain, leg swelling and palpitations.   Gastrointestinal: Negative for abdominal distention, abdominal pain, blood in stool, constipation, diarrhea, nausea and vomiting.   Endocrine: Negative for hot flashes.   Genitourinary: Negative for bladder incontinence, difficulty urinating, dysuria, frequency and hematuria.    Musculoskeletal: Negative for gait problem, neck pain and neck stiffness.   Skin: Negative for rash.   Neurological: Negative for dizziness, gait problem, headaches, light-headedness and numbness.   Hematological: Negative for adenopathy. Does not bruise/bleed easily.   Psychiatric/Behavioral: Negative for depression. The patient is not nervous/anxious.    All other systems reviewed and are negative.       PHYSICAL EXAM    Vitals:    02/03/20 0930   BP: 155/100   Pulse: 92   Resp: 16   Temp: 97 °F (36.1 °C)   TempSrc: Temporal   SpO2: 95%   Weight: 91.6 kg (202 lb)   Height: 167.6 cm (66\")     Constitutional: Appears well-developed and well-nourished. No distress.   ECOG: (1) Restricted in physically strenuous activity, ambulatory and able to do work of light nature  HENT:   Head: Normocephalic.   Mouth/Throat: Oropharynx is clear and moist.   Eyes: Conjunctivae are normal. Pupils are equal, round, and reactive to light. No scleral icterus.   Neck: Neck supple. No JVD present. No thyromegaly present.   Cardiovascular: Normal rate, regular rhythm and normal heart sounds.    Pulmonary/Chest: Breath sounds normal. No respiratory distress.   Abdominal: Soft. Exhibits no distension and no mass. There is no hepatosplenomegaly. There is no tenderness. There is no rebound and no guarding.   Musculoskeletal:Exhibits no edema, tenderness or deformity.   Neurological: Alert and oriented to person, place, and time. Exhibits normal muscle tone.   Skin: No ecchymosis, " no petechiae and no rash noted. Not diaphoretic. No cyanosis. Nails show no clubbing.   Psychiatric: Normal mood and affect.   Vitals reviewed.      Lab Results   Component Value Date    HGB 15.4 02/03/2020    HCT 45.7 02/03/2020    MCV 86.6 02/03/2020     02/03/2020    WBC 7.10 02/03/2020    NEUTROABS 5.40 02/03/2020    LYMPHSABS 1.30 02/03/2020    MONOSABS 0.40 02/03/2020    EOSABS 0.27 12/18/2019    BASOSABS 0.04 12/18/2019       Lab Results   Component Value Date    GLUCOSE 86 02/03/2020    BUN 35 (H) 02/03/2020    CREATININE 1.50 (H) 02/03/2020     02/03/2020    K 3.9 02/03/2020     02/03/2020    CO2 27.0 02/03/2020    CALCIUM 8.6 02/03/2020    PROTEINTOT 6.8 02/03/2020    ALBUMIN 3.60 02/03/2020    BILITOT 0.3 02/03/2020    ALKPHOS 50 02/03/2020    AST 18 02/03/2020    ALT 33 02/03/2020             ASSESSMENT & PLAN:    1. Kidney cancer  2. Biopsy-proven liver metastases  3. Radiographic evidence of bone metastases and lung metastases  4. Probable adrenal metastases   5. Possible brain metastases  6. Hypertension   7. Severe back pain    Discussion:  We will proceed with cycle #2 of Keytruda today.  He will continue to monitor his blood pressure at home.  Encouraged him to monitor temperature and notify us with fever or any other acute symptoms.  He is following with palliative but states his back pain is tolerable.  He takes tramadol when needed.  Creatinine 1.50 and BUN 35 today.  Continue to monitor.      He will follow up in 3 weeks with next cycle of treatment.        Trinh Alvarenga, APRN    01/13/2020

## 2020-02-06 PROBLEM — C79.51 BONE METASTASES: Status: ACTIVE | Noted: 2020-01-01

## 2020-02-06 NOTE — PROGRESS NOTES
FOLLOW UP NOTE    PATIENT:                                                      Alphonso Evans  MEDICAL RECORD #:                        [id number]  :                                                          1965  COMPLETION DATE:   2020  DIAGNOSIS:     Renal cancer, left (CMS/HCC)  - Stage IV (cT1b, cN0, pM1)      BRIEF HISTORY:    Mr. Evans is a very pleasant 54-year-old gentleman with metastatic renal cell to bone, liver, adrenal gland and possibly brain.  He received 14 Gray x1 to a lumbar spine metastasis on 2020.  He said his pain greatly improved until 2 days ago.  He picked up a 50 pound bag of dog food and has mild back pain.  He has no pain when sitting, lying or when he is sleeping.  He has pain that can go to 4-5 out of 10 when getting up from a chair or with increased activity.  He has tramadol to take for pain.  He continues to do reps with light weights.  He sees Dr. Vicenta Austin of palliative care and Dr. Light of pain management.  MRI of the brain on 2020 showed 2 sites of right frontotemporal involvement of abnormal enhancement on postcontrast sequences concerning for intracranial metastasis.  These were not well identified on the precontrast T1 sequences.  He was asymptomatic so we decided to reimage in a short interval.  He is scheduled to see Dr. Rizo in 2 weeks with MRI of the brain.  He is undergoing Keytruda and axitnib systemic therapy per Dr. Coffey.    MEDICATIONS: Medication reconciliation for the patient was reviewed and confirmed in the electronic medical record.    Review of Systems   Constitutional: Positive for fatigue.   Musculoskeletal: Positive for back pain.   Neurological: Positive for numbness (RLE).   All other systems reviewed and are negative.      KPS 90%    Physical Exam   Constitutional: He appears well-developed and well-nourished.   Neck: Neck supple.   Cardiovascular: Regular rhythm and normal heart sounds.   Mildly tachycardic  "  Pulmonary/Chest: Effort normal and breath sounds normal.   Skin:   He has no skin changes on his back from treatment.   Nursing note and vitals reviewed.      VITAL SIGNS:   Vitals:    02/06/20 0903   BP: 136/90   Pulse: 96   Resp: 18   Temp: 97.8 °F (36.6 °C)   TempSrc: Temporal   SpO2: 96%  Comment: RA   Weight: 89.4 kg (197 lb)   Height: 167.6 cm (66\")   PainSc:   6   PainLoc: Back  Comment: lower back       The following portions of the patient's history were reviewed and updated as appropriate: allergies, current medications, past family history, past medical history, past social history, past surgical history and problem list.         Alphonso was seen today for other.    Diagnoses and all orders for this visit:    Bone metastases (CMS/HCC)         IMPRESSION:  Mr. Evans is a very pleasant 54-year-old gentleman with metastatic renal cell to bone, liver, adrenal gland and possibly brain.  He received 14 Gray x1 to a lumbar spine metastasis on 1/9/2020.  He said his pain greatly improved until 2 days ago.  He picked up a 50 pound bag of dog food and has mild back pain.  He has no pain when sitting, lying or when he is sleeping.  He has pain that can go to 4-5 out of 10 when getting up from a chair or with increased activity.  He has tramadol to take for pain.  He sees Dr. Vicenta Austin of palliative care and Dr. Light of pain management.  MRI of the brain on 1/7/2020 showed 2 sites of right frontotemporal involvement of abnormal enhancement on postcontrast sequences concerning for intracranial metastasis.  These were not well identified on the precontrast T1 sequences.  He was asymptomatic so we decided to reimage in a short interval.  He is scheduled to see Dr. Rizo in 2 weeks with MRI of the brain.  He is undergoing Keytruda and axitnib systemic therapy per Dr. Coffey.      RECOMMENDATIONS: We will see Mr. vEans back as needed.  We discussed the possibility of brain metastases and returning for " CyberKnife stereotactic radiosurgery if he has brain metastasis on the next MRI.  He has been cautioned not to overdo activity until he has more healing in the spine.  He will continue chemotherapy per Dr. Coffey.  We will see him back as needed.  It is a pleasure to participate in his care.  Return for PRN.    Romana Madrigal MD    Errors in dictation may reflect use of voice recognition software and not all errors in transcription may have been detected prior to signing.

## 2020-02-11 NOTE — ED PROVIDER NOTES
"Subjective   Mr. Alphonso Evans is a 54 y.o. male who presents to the ED with c/o shortness of breath. He has a history of renal cell carcinoma with metastases to the lung, liver, and bone diagnosed in December 2019. He is currently taking daily oral chemotherapy, Inlyta, as well as IV chemotherapy, Keytruda. His most recent chemo infusion was 1 week ago. He reports he has been moderately short of breath since his diagnosis in December, but today his shortness of breath is significantly worse. He feels he is \"not able to catch his breath\". He also notes he has had a fever nightly for the past 1 week, with the highest being 102.0 F. He also complains of associated chills and diaphoresis. He denies chest pain, palpitations, or facial swelling. He called his primary care provider today and was directed to present to the ED. There are no other acute symptoms at this time.      History provided by:  Patient  Shortness of Breath   Severity:  Moderate  Onset quality:  Gradual  Duration:  3 months  Timing:  Constant  Progression:  Worsening  Chronicity:  New  Relieved by:  None tried  Worsened by:  Nothing  Ineffective treatments:  None tried  Associated symptoms: diaphoresis and fever    Associated symptoms: no chest pain        Review of Systems   Constitutional: Positive for chills, diaphoresis and fever.   HENT: Negative for facial swelling.    Respiratory: Positive for shortness of breath.    Cardiovascular: Negative for chest pain and palpitations.   All other systems reviewed and are negative.      Past Medical History:   Diagnosis Date   • Hypertension        No Known Allergies    Past Surgical History:   Procedure Laterality Date   • KNEE SURGERY Left     x 3   • ROTATOR CUFF REPAIR Right 2014       Family History   Problem Relation Age of Onset   • Cancer Father    • Heart disease Father        Social History     Socioeconomic History   • Marital status:      Spouse name: Not on file   • Number of " children: Not on file   • Years of education: Not on file   • Highest education level: Not on file   Tobacco Use   • Smoking status: Never Smoker   • Smokeless tobacco: Never Used   Substance and Sexual Activity   • Alcohol use: Never     Frequency: Never   • Drug use: Never   • Sexual activity: Defer   Social History Narrative    Lives in [place]. 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          Objective   Physical Exam   Constitutional: He is oriented to person, place, and time. He appears well-developed and well-nourished. No distress.   HENT:   Head: Normocephalic and atraumatic.   Nose: Nose normal.   Eyes: Conjunctivae are normal. No scleral icterus.   Neck: Normal range of motion. Neck supple.   Cardiovascular: Normal rate, regular rhythm and normal heart sounds.   No murmur heard.  Pulmonary/Chest: Effort normal. No tachypnea. No respiratory distress. He has no wheezes.   Increased work of breathing and mildly diminished breath sounds.    Abdominal: Soft. There is no tenderness.   Musculoskeletal: Normal range of motion. He exhibits no edema.   The left shin has a 2.5 cm round, raised area of hard nodule. There is no calf swelling, redness, or increased warmth. Negative Jovanny's sign bilaterally and no tenderness to palpation.   Neurological: He is alert and oriented to person, place, and time.   Skin: Skin is warm and dry.   Psychiatric: He has a normal mood and affect. His behavior is normal.   Nursing note and vitals reviewed.      Procedures         ED Course  ED Course as of Feb 11 0005   Mon Feb 10, 2020   1931 Lactate(!!): 2.6 [RS]   1931 Creatinine(!): 1.44 [RS]   1931 Troponin T: <0.010 [RS]   2037 No evidence of acute pulmonary embolism.  No evidence of pneumonia.  Findings consistent with metastatic process with known history of renal cell carcinoma.  We will discussed the case with oncology.  Dr. Dieudonne owens.    [RS]   2040 Discussed with Dr. Bro, who recommends that the  patient follow up with Dr. Coffey if he is comfortable with discharge.--RS    [RF]   2051 Negative viral panel.  Patient is resting comfortably and states he wants to go home.  I updated him on the findings with plan and recommendation for calling and discussing the symptoms with Dr. Coffey tomorrow for further management and evaluation as indicated.  Patient states he does not want to stay in the hospital. I had a discussion with the patient/family regarding diagnosis, diagnostic results, treatment plan, and medications.  The patient/family indicated understanding of these instructions.  I spent adequate time at the bedside proceeding discharge necessary to personally discuss the aftercare instructions, giving patient education, providing explanations of the results of our evaluations/findings, and my decision making to assure that the patient/family understand the plan of care.  Time was allotted to answer questions at that time and throughout the ED course.  Emphasis was placed on timely follow-up after discharge.  I also discussed the potential for the development of an acute emergent condition requiring further evaluation, admission, or even surgical intervention. I discussed that we found nothing during the visit today indicating the need for further workup, admission, or the presence of an unstable medical condition.  I encouraged the patient to return to the emergency department immediately for ANY concerns, worsening, new complaints, or if symptoms persist and unable to seek follow-up in a timely fashion.  The patient/family expressed understanding and agreement with this plan.     [RS]      ED Course User Index  [RF] Mariana Pena  [RS] Matthew Isaacs MD     Recent Results (from the past 24 hour(s))   Comprehensive Metabolic Panel    Collection Time: 02/10/20  6:36 PM   Result Value Ref Range    Glucose 107 (H) 65 - 99 mg/dL    BUN 21 (H) 6 - 20 mg/dL    Creatinine 1.44 (H) 0.76 - 1.27 mg/dL     Sodium 139 136 - 145 mmol/L    Potassium 4.2 3.5 - 5.2 mmol/L    Chloride 100 98 - 107 mmol/L    CO2 27.0 22.0 - 29.0 mmol/L    Calcium 8.7 8.6 - 10.5 mg/dL    Total Protein 6.3 6.0 - 8.5 g/dL    Albumin 3.60 3.50 - 5.20 g/dL    ALT (SGPT) 22 1 - 41 U/L    AST (SGOT) 17 1 - 40 U/L    Alkaline Phosphatase 55 39 - 117 U/L    Total Bilirubin 0.3 0.2 - 1.2 mg/dL    eGFR Non African Amer 51 (L) >60 mL/min/1.73    Globulin 2.7 gm/dL    A/G Ratio 1.3 g/dL    BUN/Creatinine Ratio 14.6 7.0 - 25.0    Anion Gap 12.0 5.0 - 15.0 mmol/L   BNP    Collection Time: 02/10/20  6:36 PM   Result Value Ref Range    proBNP 65.1 5.0 - 900.0 pg/mL   Troponin    Collection Time: 02/10/20  6:36 PM   Result Value Ref Range    Troponin T <0.010 0.000 - 0.030 ng/mL   Light Blue Top    Collection Time: 02/10/20  6:36 PM   Result Value Ref Range    Extra Tube hold for add-on    Green Top (Gel)    Collection Time: 02/10/20  6:36 PM   Result Value Ref Range    Extra Tube Hold for add-ons.    Lavender Top    Collection Time: 02/10/20  6:36 PM   Result Value Ref Range    Extra Tube hold for add-on    Gold Top - SST    Collection Time: 02/10/20  6:36 PM   Result Value Ref Range    Extra Tube Hold for add-ons.    CBC Auto Differential    Collection Time: 02/10/20  6:36 PM   Result Value Ref Range    WBC 6.22 3.40 - 10.80 10*3/mm3    RBC 5.23 4.14 - 5.80 10*6/mm3    Hemoglobin 15.3 13.0 - 17.7 g/dL    Hematocrit 44.7 37.5 - 51.0 %    MCV 85.5 79.0 - 97.0 fL    MCH 29.3 26.6 - 33.0 pg    MCHC 34.2 31.5 - 35.7 g/dL    RDW 13.6 12.3 - 15.4 %    RDW-SD 42.2 37.0 - 54.0 fl    MPV 9.0 6.0 - 12.0 fL    Platelets 181 140 - 450 10*3/mm3    Neutrophil % 65.9 42.7 - 76.0 %    Lymphocyte % 19.0 (L) 19.6 - 45.3 %    Monocyte % 10.8 5.0 - 12.0 %    Eosinophil % 1.0 0.3 - 6.2 %    Basophil % 1.0 0.0 - 1.5 %    Immature Grans % 2.3 (H) 0.0 - 0.5 %    Neutrophils, Absolute 4.11 1.70 - 7.00 10*3/mm3    Lymphocytes, Absolute 1.18 0.70 - 3.10 10*3/mm3    Monocytes, Absolute  0.67 0.10 - 0.90 10*3/mm3    Eosinophils, Absolute 0.06 0.00 - 0.40 10*3/mm3    Basophils, Absolute 0.06 0.00 - 0.20 10*3/mm3    Immature Grans, Absolute 0.14 (H) 0.00 - 0.05 10*3/mm3    nRBC 0.0 0.0 - 0.2 /100 WBC   Lactic Acid, Plasma    Collection Time: 02/10/20  6:36 PM   Result Value Ref Range    Lactate 2.6 (C) 0.5 - 2.0 mmol/L   Procalcitonin    Collection Time: 02/10/20  6:36 PM   Result Value Ref Range    Procalcitonin 0.11 0.10 - 0.25 ng/mL   Lactic Acid, Reflex Timer (This will reflex a repeat order 3-3:15 hours after ordered.)    Collection Time: 02/10/20  6:36 PM   Result Value Ref Range    Extra Tube Hold for add-ons.    Respiratory Panel, PCR - Swab, Nasopharynx    Collection Time: 02/10/20  7:12 PM   Result Value Ref Range    ADENOVIRUS, PCR Not Detected Not Detected    Coronavirus 229E Not Detected Not Detected    Coronavirus HKU1 Not Detected Not Detected    Coronavirus NL63 Not Detected Not Detected    Coronavirus OC43 Not Detected Not Detected    Human Metapneumovirus Not Detected Not Detected    Human Rhinovirus/Enterovirus Not Detected Not Detected    Influenza B PCR Not Detected Not Detected    Parainfluenza Virus 1 Not Detected Not Detected    Parainfluenza Virus 2 Not Detected Not Detected    Parainfluenza Virus 3 Not Detected Not Detected    Parainfluenza Virus 4 Not Detected Not Detected    Bordetella pertussis pcr Not Detected Not Detected    Influenza A H1 2009 PCR Not Detected Not Detected    Chlamydophila pneumoniae PCR Not Detected Not Detected    Mycoplasma pneumo by PCR Not Detected Not Detected    Influenza A PCR Not Detected Not Detected    Influenza A H3 Not Detected Not Detected    Influenza A H1 Not Detected Not Detected    RSV, PCR Not Detected Not Detected    Bordetella parapertussis PCR Not Detected Not Detected     Note: In addition to lab results from this visit, the labs listed above may include labs taken at another facility or during a different encounter within the  last 24 hours. Please correlate lab times with ED admission and discharge times for further clarification of the services performed during this visit.    CT Angiogram Chest   Final Result      1. No CT evidence for pulmonary embolus.   2. Adenopathy within the mediastinum and right hilum as detailed above.   3. Interstitial reticulonodular infiltrates involving the upper and lower lobes bilaterally, probably representing a combination of pulmonary edema and micronodules from metastatic disease. Clinical correlation recommended.   4. Small to moderate bilateral pleural effusions with bibasilar atelectasis.   5. Ill-defined 5.6 cm cystic mass upper pole left kidney concerning for neoplasm. There is stranding in the fat surrounding the mass concerning for neoplastic spread.   6. Low-density nodules on both adrenal glands suspicious for metastatic disease.   7. Ill-defined low-density masses within the right hepatic lobe characteristic of metastatic disease.   8. Cholelithiasis.      Signer Name: Owen Dallas MD    Signed: 2/10/2020 8:32 PM    Workstation Name: [id number]     [facility]      XR Chest 1 View   Final Result   Top normal size heart with mild pulmonary edema.      Signer Name: Owen Dallas MD    Signed: 2/10/2020 8:18 PM    Workstation Name: [id number]     [facility]        Vitals:    02/10/20 1931 02/10/20 2000 02/10/20 2030 02/10/20 2100   BP:  107/82 123/87 122/93   BP Location:       Patient Position:       Pulse: 81 84 80 77   Resp:       Temp:       TempSrc:       SpO2: 93% 93% 93% 96%   Weight:       Height:         Medications   lactated ringers bolus 2,000 mL (0 mL Intravenous Stopped 2/10/20 2109)   iopamidol (ISOVUE-370) 76 % injection 100 mL (75 mL Intravenous Given by Other 2/10/20 2009)     ECG/EMG Results (last 24 hours)     Procedure Component Value Units Date/Time    ECG 12 Lead [[id number]] Collected:  02/10/20 1829     Updated:  02/10/20  1835        ECG 12 Lead   Final Result   Test Reason : SOA Protocol   Blood Pressure : **/** mmHG   Vent. Rate : 083 BPM     Atrial Rate : 083 BPM      P-R Int : 136 ms          QRS Dur : 088 ms       QT Int : 350 ms       P-R-T Axes : 034 000 012 degrees      QTc Int : 411 ms      Normal sinus rhythm   Inferior infarct (cited on or before 10-FEB-2020)   Abnormal ECG   When compared with ECG of 17-FEB-2011 13:55,   Questionable change in initial forces of Inferior leads   Confirmed by MANDO ISAACS MD ([id number]) on 2/10/2020 8:26:37 PM      Referred By:  EDMD           Confirmed By:MANDO ISAACS MD                  MDM  Number of Diagnoses or Management Options  Chronic renal impairment, unspecified CKD stage:   Renal cell carcinoma of left kidney metastatic to other site (CMS/HCC):   Shortness of breath:      Amount and/or Complexity of Data Reviewed  Clinical lab tests: reviewed  Tests in the radiology section of CPT®: reviewed  Decide to obtain previous medical records or to obtain history from someone other than the patient: yes  Obtain history from someone other than the patient: yes  Discuss the patient with other providers: yes  Independent visualization of images, tracings, or specimens: yes        Final diagnoses:   Shortness of breath   Renal cell carcinoma of left kidney metastatic to other site (CMS/HCC)   Chronic renal impairment, unspecified CKD stage       Documentation assistance provided by wilian Cee.  Information recorded by the scribe was done at my direction and has been verified and validated by me.     Gabriele Cee  02/10/20 1944       Mariana Pena  02/10/20 2116       Mando Isaacs MD  02/11/20 0005

## 2020-02-13 PROBLEM — R05.3 COUGH, PERSISTENT: Status: ACTIVE | Noted: 2020-01-01

## 2020-02-13 NOTE — PATIENT INSTRUCTIONS
1) Prescribed phenergan 12.5mg every 6 hours as needed for nausea/vomiting. Can take in anticipation of eating, even if you don't have nausea at that time.   2) Prescribed tessalon perles for cough. If ineffective, can try over the counter robitussin.  3) We will see you back in 1 month. If any issues between then, please call and let us know.

## 2020-02-13 NOTE — PROGRESS NOTES
Pt presented to clinic today with sister. Pt ambulatory, vss, a&ox4, and appropriate.  Pt pain well controlled. Stopped gabapentin and taking tramadol rarley as needed.  Pt main complaint is nausea keeping pt from eating and zofran is not effective. Wt loss 4 pounds in a week.

## 2020-02-13 NOTE — PROGRESS NOTES
PALLIATIVE CARE PROGRESS NOTE:    Subjective   Alphonso Evans is a 54 y.o. male.     History of Present Illness   Referring/Oncology:  Jose Coffey MD and Tila GALVEZ (referral placed 12/30/19)  Primary Care:  Juan Mccarty MD     [id number] with stage IV renal cell carcinoma metastatic to mediastinal LNs, lung nodules, rib metastases, liver, bilateral adrenal glands, and brain (R frontotemporal), L2 with extension to paraspinal soft tissues and posterior extradural spinal canal with compression of thecal sac at L2  Dx 12/19/19 after persistent and rapidly escalating back pain that he contributed to MSK pains related to horse riding.  S/p SRT to L spine metastasis 1/9/20.    Had ED visit on 2/11/2020 for SOA. Scans were negative for PE or PNA. He was given albuterol inhaler, which he says only helps a little, though he often forgets that he has it. Dyspnea has improved, though not back to where he was prior to ED visit. Is having anticipatory nausea which does not respond to zofran. If he is able to get food down, though, he has no nausea. Bowels moving regularly. No insomnia.      Treatment plan:  Axitinib, Pembro. Received Pembro Cycle #2 on 2/3/2020.      Pain: low mid back continuous throbbing ache with numbness of R leg.  Impacted his ambulation.     Medication management:  Decadron, Ibuprofen 800mg TID,  Tramadol 100mg TID - takes 100mg at bedtime, occasionally takes 50-100mg in morning.  Percocet 7.5/325mg PRN - has stopped.  Gabapentin 100mg TID--was increased after last visit though he did not tolerate due to drowsiness--has stopped.      Symptoms: Increasing dyspnea with exertion.  Winded with getting out of car or more quickly with gym exercises.  Denies orthopnea, wheeze.     Function: Independent of ADLs.  No longer driving due to pain, medication side effect concerns.     Support/Strengths:  Currently staying with his sister in [place].  She is APRN hospitalist here.  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  Practices mindfulness meditation daily.  Healthy athlete, healthy diet, and vitamin supplements.     Distress/Difficulties: None expressed today. Note that he is resistant to any daily medication regimen and particularly does not want any medications that may cause drowsiness. Noted family history of early CAD, father with MI in his 50s.     Substance Use History: never use     ACP/Goals: pain management when off steroids (must stop for Pembro efficacy)    The following portions of the patient's history were reviewed and updated as appropriate: allergies, current medications, past family history, past medical history, past social history, past surgical history and problem list.    Review of Systems  Otherwise negative except as below and as already detailed in HPI.    RAFAL:  Reviewed.  See scanned form in Media. No concerns.  Consistent with history.  Prescribers identified as members of care team.     Medication Counts:  Reviewed.  See RN note. Did not bring medication to appointment    CONTROLLED SUBSTANCE TRACKING 1/21/2020 2/13/2020   Last Rafal 1/20/2020 2/12/2020   Report Number [id number]   Last UDS - 1/21/2020   Last Controlled Substance Agreement - 1/21/2020   ORT Initial Risk Score 1 1   Prior UDT result - Expected   Pill count Did not bring Did not bring   Disposal Education and Agreement [id number]       UDS:  THC, Screen, Urine   Date Value Ref Range Status   01/21/2020 Negative Negative Final     Phencyclidine (PCP), Urine   Date Value Ref Range Status   01/21/2020 Negative Negative Final     Cocaine Screen, Urine   Date Value Ref Range Status   01/21/2020 Negative Negative Final     Methamphetamine, Ur   Date Value Ref Range Status   01/21/2020 Negative Negative Final     Opiate Screen   Date Value Ref Range Status   01/21/2020 Negative Negative Final     Amphetamine Screen, Urine   Date Value Ref Range Status    01/21/2020 Negative Negative Final     Benzodiazepine Screen, Urine   Date Value Ref Range Status   01/21/2020 Negative Negative Final     Tricyclic Antidepressants Screen   Date Value Ref Range Status   01/21/2020 Negative Negative Final     Methadone Screen, Urine   Date Value Ref Range Status   01/21/2020 Negative Negative Final     Barbiturates Screen, Urine   Date Value Ref Range Status   01/21/2020 Negative Negative Final     Oxycodone Screen, Urine   Date Value Ref Range Status   01/21/2020 Negative Negative Final     Propoxyphene Screen   Date Value Ref Range Status   01/21/2020 Negative Negative Final     Buprenorphine, Screen, Urine   Date Value Ref Range Status   01/21/2020 Negative Negative Final     Palliative Performance Scale  Palliative Performance Scale Score: 80%    [place] Symptom Assessment System Revised  Pain Score: 2   ESAS Tiredness Score: 2  ESAS Nausea Score: No nausea  ESAS Depression Score: No depression  ESAS Anxiety Score: No anxiety  ESAS Drowsiness Score: 4  ESAS Lack of Appetite Score: 2  ESAS Wellbeing Score: 4  ESAS Dyspnea Score: 7  ESAS Source of Information: patient    BROWN-7:    Over the last two weeks, how often have you been bothered by the following problems?  Feeling nervous, anxious or on edge: Not at all  Not being able to stop or control worrying: Not at all  Worrying too much about different things: Not at all  Trouble Relaxing: Not at all  Being so restless that it is hard to sit still: Not at all  Becoming easily annoyed or irritable: Not at all  Feeling afraid as if something awful might happen: Not at all  BROWN 7 Total Score: 0    PHQ-9:  PHQ-2/PHQ-9 Depression Screening 2/13/2020   Little interest or pleasure in doing things 2   Feeling down, depressed, or hopeless 0   Trouble falling or staying asleep, or sleeping too much 0   Feeling tired or having little energy 2   Poor appetite or overeating 2   Feeling bad about yourself - or that you are a failure or have  let yourself or your family down 0   Trouble concentrating on things, such as reading the newspaper or watching television 0   Moving or speaking so slowly that other people could have noticed. Or the opposite - being so fidgety or restless that you have been moving around a lot more than usual 0   Thoughts that you would be better off dead, or of hurting yourself in some way 0   Total Score 6        ECOG: (2) Ambulatory and capable of self care, unable to carry out work activity, up and about > 50% or waking hours    Objective   Physical Exam   Constitutional: He is oriented to person, place, and time. He appears well-developed and well-nourished. No distress.   HENT:   Head: Normocephalic and atraumatic.   Eyes: Pupils are equal, round, and reactive to light. EOM are normal.   Cardiovascular: Normal rate, regular rhythm, normal heart sounds and intact distal pulses.   No murmur heard.  Pulmonary/Chest: Effort normal and breath sounds normal. No respiratory distress.   Dry cough after deep inspiration   Abdominal: Soft. Bowel sounds are normal.   Musculoskeletal: He exhibits no edema.   Neurological: He is alert and oriented to person, place, and time.   Skin: Skin is warm and dry.   Psychiatric: He has a normal mood and affect. His behavior is normal.         Assessment/Plan   Alphonso was seen today for appointment, follow-up and renal cancer.    Diagnoses and all orders for this visit:    Dyspnea on exertion    Lumbar radiculopathy    Cancer related pain    Cough, persistent    Other orders  -     promethazine (PHENERGAN) 12.5 MG tablet; Take 1 tablet by mouth Every 6 (Six) Hours As Needed for Nausea or Vomiting for up to 60 doses.  -     benzonatate (TESSALON PERLES) 100 MG capsule; Take 2 capsules by mouth 3 (Three) Times a Day As Needed for Cough.           Universal precautions:    ORT risk:  Low risk  Aberrant behavior:  None.  UDT and PDMP as expected  Indication:  Cancer inflammatory bone pain and  "radiculopathy of L2 and L4 malignant process  OME:  30mg  Naloxone prescribed:  no     Advance care plan:    -  Healthcare decision making plan: sister listed as emergency contact  -  MOST discussions thus far:  Not explored      Total face to face time spent:  15 min.  Greater than 50% time spent in counseling and discussion re:  - managing nausea- will try phenergan 12.5mg q6h prn; patient will call if ineffective or causing excessive drowsiness. Can then consider marinol.   - Prescribed tessalon perles for dry cough. Discussed the possibility that cough may be due to airway irritation due to cancer, where nodules may have \"pseudoprogressed\" due to Keytruda.         Care coordination:   - Prescribed phenergan and tessalon. Consider marinol if he fails zofran and phenergan.  - F/u in 1 month      *BRIEF ATTENDING PHYSICIAN ADDENDUM:  Care hx communicated by Dr. Pearce prior to visit.  Chart reviewed, including prior medication Rxs.  Patient seen and examined with sister Felecia present.  Hx and exam discussed with HPM fellow, Dr. Sol, and care plan formulated.  Agree w/ detail as documented by Dr. Bhandari above.    Safia Venegas MD  Associate Medical Director  [facility]             "

## 2020-02-20 NOTE — PROGRESS NOTES
NAME: FARNAZ EVANS   DOS: 2020  : 1965  PCP: Juan Mccarty MD    Chief Complaint:    Chief Complaint   Patient presents with   • Back Pain       History of Present Illness:  54 y.o. male   I saw Farnaz Evans in neurosurgical consultation he is a gentleman who had a history of a lumbar posterior renal cell metastatic lesion at the L2 vertebral area that was treated with CyberKnife    He also was followed for a small vehicle Juan Jose space has been stable since 2016 on a noncontrasted CAT scan        PMHX  Allergies:  No Known Allergies  Medications    Current Outpatient Medications:   •  albuterol sulfate  (90 Base) MCG/ACT inhaler, Inhale 2 puffs Every 4 (Four) Hours As Needed for Wheezing or Shortness of Air., Disp: 1 inhaler, Rfl: 0  •  axitinib (INLYTA) 5 MG chemo tablet, Take 1 tablet by mouth Every 12 (Twelve) Hours., Disp: 60 tablet, Rfl: 5  •  benzonatate (TESSALON PERLES) 100 MG capsule, Take 2 capsules by mouth 3 (Three) Times a Day As Needed for Cough., Disp: 90 capsule, Rfl: 0  •  doxazosin (CARDURA) 8 MG tablet, 2 tabs po daily (Patient taking differently: 16 mg Every Night.), Disp: 180 tablet, Rfl: 3  •  hydroCHLOROthiazide (HYDRODIURIL) 25 MG tablet, Take 1 tablet by mouth Daily., Disp: 30 tablet, Rfl: 0  •  losartan (COZAAR) 50 MG tablet, Take 100 mg by mouth Daily., Disp: , Rfl:   •  metoprolol succinate XL (TOPROL-XL) 50 MG 24 hr tablet, 100 mg Daily., Disp: , Rfl:   •  ondansetron (ZOFRAN) 8 MG tablet, Take 8 mg by mouth Every 8 (Eight) Hours As Needed for Nausea or Vomiting., Disp: , Rfl:   •  pantoprazole (PROTONIX) 40 MG EC tablet, Take 1 tablet by mouth Daily., Disp: 30 tablet, Rfl: 0  •  Pembrolizumab (KEYTRUDA IV), Infuse  into a venous catheter Every 21 (Twenty-One) Days., Disp: , Rfl:   •  promethazine (PHENERGAN) 12.5 MG tablet, Take 1 tablet by mouth Every 6 (Six) Hours As Needed for Nausea or Vomiting for up to 60 doses., Disp: 60 tablet, Rfl: 0  •   traMADol (ULTRAM) 50 MG tablet, Take 50 mg by mouth Every 6 (Six) Hours As Needed for Moderate Pain ., Disp: , Rfl:   No current facility-administered medications for this visit.   Past Medical History:  Past Medical History:   Diagnosis Date   • Cancer (CMS/HCC)     Renal, Spine, Brain   • Hypertension      Past Surgical History:  Past Surgical History:   Procedure Laterality Date   • CYBERKNIFE Right 01/09/2020    Right paraspinal lesion behind the L2 vertebral body- Dr. Michael Rizo    • KNEE SURGERY Left     x 3   • ROTATOR CUFF REPAIR Right 2014     Social Hx:  Social History     Tobacco Use   • Smoking status: Never Smoker   • Smokeless tobacco: Never Used   Substance Use Topics   • Alcohol use: Never     Frequency: Never   • Drug use: Never     Family Hx:  Family History   Problem Relation Age of Onset   • Cancer Father    • Heart disease Father      Review of Systems:        Review of Systems   Constitutional: Positive for activity change, appetite change, chills, fatigue and fever. Negative for diaphoresis and unexpected weight change.   HENT: Negative for congestion, dental problem, drooling, ear discharge, ear pain, facial swelling, hearing loss, mouth sores, nosebleeds, postnasal drip, rhinorrhea, sinus pressure, sinus pain, sneezing, sore throat, tinnitus, trouble swallowing and voice change.    Eyes: Negative for photophobia, pain, discharge, redness, itching and visual disturbance.   Respiratory: Positive for cough and shortness of breath. Negative for apnea, choking, chest tightness, wheezing and stridor.    Cardiovascular: Negative for chest pain, palpitations and leg swelling.   Gastrointestinal: Positive for diarrhea and nausea. Negative for abdominal distention, abdominal pain, anal bleeding, blood in stool, constipation, rectal pain and vomiting.   Endocrine: Negative for cold intolerance, heat intolerance, polydipsia, polyphagia and polyuria.   Genitourinary: Negative for decreased urine  volume, difficulty urinating, discharge, dysuria, enuresis, flank pain, frequency, genital sores, hematuria, penile pain, penile swelling, scrotal swelling, testicular pain and urgency.   Musculoskeletal: Positive for back pain. Negative for arthralgias, gait problem, joint swelling, myalgias, neck pain and neck stiffness.   Skin: Negative for color change, pallor, rash and wound.   Allergic/Immunologic: Negative for environmental allergies, food allergies and immunocompromised state.   Neurological: Positive for dizziness, weakness, light-headedness, numbness and headaches. Negative for tremors, seizures, syncope, facial asymmetry and speech difficulty.   Hematological: Negative for adenopathy. Does not bruise/bleed easily.   Psychiatric/Behavioral: Negative for agitation, behavioral problems, confusion, decreased concentration, dysphoric mood, hallucinations, self-injury, sleep disturbance and suicidal ideas. The patient is not nervous/anxious and is not hyperactive.       I have reviewed this note template and all pertinent parts of the review of systems social, family history, surgical history and medication list      Physical Examination:  Vitals:    02/20/20 1528   BP: 112/78   Temp: 98.4 °F (36.9 °C)      General Appearance:   Well developed, well nourished, well groomed, alert, and cooperative.  Neurological examination:  Neurologic Exam  He is very strong in his lower extremities stronger than he was in the hospital    Reflexes are symmetric    Cranial nerves grossly intact he is able to walk and has no issues with weakness clonus hyperreflexia neck or head pain        Review of Imaging/DATA:  I reviewed his MRIs of his cervical and head there is no evidence of change  Diagnoses/Plan:    Mr. Evans is a 54 y.o. male   This gentleman presents with a history of a dorsally located L2-3 lateral recess cord compression of the exiting nerve root with resolution of his right lower extremity pain he is off  steroids he also had an incidental severe [place] Juan Jose space this been stable on 2 scans as well as 1 in 2010 CT of the head    From my standpoint I think it 1 more follow-up scan of his head and lumbar spine to ensure stability with absence of compression would be in order will make arrangements for that I explained explicitly the signs and symptoms to follow-up for earlier

## 2020-02-24 NOTE — PROGRESS NOTES
CHIEF COMPLAINT: Management of metastatic renal cell carcinoma.    Problem List:  Oncology/Hematology History    1. Kidney cancer  2. Biopsy-proven liver metastases  3. Radiographic evidence of bone metastases and lung metastases  4. Probable adrenal metastases    Oncology timeline:  -12/18/2019 presented to emergency room with back pain.  Started while riding horses 12/11/2019 but began suddenly.  Went to chiropractor with no results.  Went to  emergency room on 12/14/2018 due to the pain radiating down into his lower extremities and they gave him steroids and a muscle relaxer.  12/18/2019 MRI emergency room showed L4 osseous tumor with extension into the paraspinal soft tissues as well as posterior extradural spinal canal with severe compression of the thecal sac at L2.  CTs of chest abdomen pelvis revealed mediastinal adenopathy, multiple lung nodules concerning for metastasis, and likely rib metastases.  There are abnormalities in the liver consistent with metastasis and a 6.3 cm left renal mass likely the primary as well as a left adrenal mass and involvement of the right adrenal gland.  MRI of lumbar and thoracic spine revealed multiple degenerative changes.  Saw Dr. Rizo who did not recommend surgery and they recommended CyberKnife radiation for which Dr. Madrigal evaluated 12/18/2019.  -12/20/2019 CT-guided liver mass biopsy suggestive of atypical non-clear-cell renal cell carcinoma sent to [facility] for further consultation.  Favor white count of 15,000, CBC and CMP unremarkable.  PSA normal at 2.69.  Per Dr. Pruitt this could be a collecting duct carcinoma or another rare variant of a kidney cancer.    If this is a collecting duct carcinoma, this would be a very aggressive subtype of renal cell carcinoma.  Patients with a fumarate hydratase genomic alteration might benefit from M tor inhibitors particularly if there is in an NF2 alteration.  [id number] suggests cisplatin resistance.  CDKN2A is seen  in about 62% of these.  Prospective trials are limited in number and number of patients but older data suggests benefit of cisplatin gemcitabine while newer data casts doubt on such.  There are also conflicting reports of response and lack thereof to targeted therapy such as tyrosine kinase inhibitors with sorafenib and sunitinib and Cabozantanib having variable responses between 30 to 50%.  PDL 1 expression is highly variable and immunotherapy while not universally helpful may have a role.  There is a clinical trial looking at gemcitabine cisplatin plus up Avastin (GETA UG-AF u24) as well as cabozantinib (Bonsai trial).  In the meantime, I will prepare for high risk kidney cancer standard therapy with Keytruda axitinib while waiting on the above molecular testing and data [facility].  We will get an MRI of his brain as well as he has some dizziness and we need completion staging.  I have contacted radiation oncology to have them follow-up for spine radiation as his right leg is getting more numb.  We will also get in with our palliative care clinic for pain management.  We will also get an MRI of his neck with numbness now happening in his left arm.    -1/6/2020 MRI of the brain shows at least 2 sites of right frontotemporal involvement of abnormal enhancement concerning for metastatic disease.  MRI of the cervical spine negative other than for minimal degenerative changes.  However, per Dr. Grimaldo who had communication with Dr. Rizo, Dr. Rizo was not convinced of these being metastatic.    -1/13/2020: Started Keytruda axitinib    -2/10/2020 CT PE protocol showed no evidence of pulmonary embolus.  There was hilar and mediastinal adenopathy with reticulonodular bilateral pulmonary changes.  Ill-defined 5.6 cm cystic mass upper pole left kidney.  Ill-defined liver lesions largest 2.2 cm consistent with metastasis.    -2/20/2020 MRI brain showed small right-sided hypoenhancing lesions  nonspecific and unchanged from 1/6/2020 with no new disease.  Status post CyberKnife to L2 by Dr. Rizo        Lumbar spine tumor    12/18/2019 Initial Diagnosis     Lumbar spine tumor      1/9/2020 - 1/9/2020 Radiation     Radiation OncologyTreatment Course:  Alphonso Evans received 1400 cGy in 1 fractions to L-spine metastatsis via Stereotactic Radiation Therapy - SRT.        Renal cancer, left (CMS/HCC)    12/22/2019 Initial Diagnosis     Renal cancer, left (CMS/HCC)      12/30/2019 Cancer Staged     Staging form: Kidney, AJCC 8th Edition  - Clinical: Stage IV (cT1b, cN0, pM1) - Signed by Jose Coffey MD on 12/30/2019 1/6/2020 Imaging     MRI of the brain IMPRESSION:  At least 2 sites of right frontotemporal involvement of  abnormal enhancement on postcontrast sequences concerning for  intracranial metastasis as these are not well identified on precontrast  T1 sequences with attention on followup. Mild chronic small vessel  ischemic disease without acute intracranial findings otherwise noted.  However, per discussion with Dr. Grimaldo, the images were reviewed by Dr. Rizo who is not convinced of these being malignant metastases.  MRI cervical spine IMPRESSION:  Minimal degenerative changes with no significant disc  protrusion, spinal stenosis or abnormal cord finding.      1/13/2020 -  Chemotherapy     OP KIDNEY Axitinib / Pembrolizumab      2/10/2020 Imaging     CT PE protocol showed no evidence of pulmonary embolus.  There was hilar and mediastinal adenopathy with reticulonodular bilateral pulmonary changes.  Ill-defined 5.6 cm cystic mass upper pole left kidney.  Ill-defined liver lesions largest 2.2 cm consistent with metastasis.      2/20/2020 Imaging     MRI brain :Small right-sided hypoenhancing lesions, nonspecific in  appearance but unchanged from previous 01/06/2020 exam. No new or  increasing intracranial abnormality to suggest metastatic disease         HISTORY OF PRESENT ILLNESS:  The  patient is a 54 y.o. male, here for follow up on management of metastatic renal cell carcinoma on Keytruda axitinib.  Having some cough but comes and goes.  No fevers or chills.  Having postprandial diarrhea but not incessant diarrhea and not voluminous.  Is fatigued but not keeping him from going to the gym.  Did not have spinal injections and says his pain is now gone.      Past Medical History:   Diagnosis Date   • Cancer (CMS/HCC)     Renal, Spine, Brain   • Hypertension      Past Surgical History:   Procedure Laterality Date   • CYBERKNIFE Right 01/09/2020    Right paraspinal lesion behind the L2 vertebral body- Dr. Michael Rizo    • KNEE SURGERY Left     x 3   • ROTATOR CUFF REPAIR Right 2014       No Known Allergies    Family History and Social History reviewed and changed as necessary      REVIEW OF SYSTEM:   Review of Systems   Constitutional: Negative for appetite change, chills, diaphoresis, fatigue, fever and unexpected weight change.   HENT:   Negative for mouth sores, sore throat and trouble swallowing.    Eyes: Negative for icterus.   Respiratory: Negative for cough, hemoptysis and shortness of breath.    Cardiovascular: Negative for chest pain, leg swelling and palpitations.   Gastrointestinal: Negative for abdominal distention, abdominal pain, blood in stool, constipation, diarrhea, nausea and vomiting.   Endocrine: Negative for hot flashes.   Genitourinary: Negative for bladder incontinence, difficulty urinating, dysuria, frequency and hematuria.    Musculoskeletal: Negative for gait problem, neck pain and neck stiffness.   Skin: Negative for rash.   Neurological: Negative for dizziness, gait problem, headaches, light-headedness and numbness.   Hematological: Negative for adenopathy. Does not bruise/bleed easily.   Psychiatric/Behavioral: Negative for depression. The patient is not nervous/anxious.    All other systems reviewed and are negative.       PHYSICAL EXAM    Vitals:    02/24/20 0901  "  BP: 135/91   Pulse: 89   Resp: 18   Temp: 97.6 °F (36.4 °C)   SpO2: 94%   Weight: 88.9 kg (196 lb)   Height: 167.6 cm (66\")     Constitutional: Appears well-developed and well-nourished. No distress.   ECOG: (1) Restricted in Physically Strenuous Activity, Ambulatory & Able to Do Work of Light Nature  HENT:   Head: Normocephalic.   Mouth/Throat: Oropharynx is clear and moist.   Eyes: Conjunctivae are normal. Pupils are equal, round, and reactive to light. No scleral icterus.   Neck: Neck supple. No JVD present. No thyromegaly present.   Cardiovascular: Normal rate, regular rhythm and normal heart sounds.    Pulmonary/Chest: Breath sounds normal. No respiratory distress.   Abdominal: Soft. Exhibits no distension and no mass. There is no hepatosplenomegaly. There is no tenderness. There is no rebound and no guarding.   Musculoskeletal:Exhibits no edema, tenderness or deformity.   Neurological: Alert and oriented to person, place, and time. Exhibits normal muscle tone.   Skin: No ecchymosis, no petechiae and no rash noted. Not diaphoretic. No cyanosis. Nails show no clubbing.   Psychiatric: Normal mood and affect.   Vitals reviewed.      Lab Results   Component Value Date    HGB 15.3 02/10/2020    HCT 44.7 02/10/2020    MCV 85.5 02/10/2020     02/10/2020    WBC 6.22 02/10/2020    NEUTROABS 4.11 02/10/2020    LYMPHSABS 1.18 02/10/2020    MONOSABS 0.67 02/10/2020    EOSABS 0.06 02/10/2020    BASOSABS 0.06 02/10/2020       Lab Results   Component Value Date    GLUCOSE 107 (H) 02/10/2020    BUN 21 (H) 02/10/2020    CREATININE 1.44 (H) 02/10/2020     02/10/2020    K 4.2 02/10/2020     02/10/2020    CO2 27.0 02/10/2020    CALCIUM 8.7 02/10/2020    PROTEINTOT 6.3 02/10/2020    ALBUMIN 3.60 02/10/2020    BILITOT 0.3 02/10/2020    ALKPHOS 55 02/10/2020    AST 17 02/10/2020    ALT 22 02/10/2020                   ASSESSMENT & PLAN:    1. Kidney cancer  2. Biopsy-proven liver metastases  3. Radiographic " evidence of bone metastases and lung metastases  4. Probable adrenal metastases   5. Possible brain metastases  6. Hypertension   7. Severe back pain resolved    Discussion: He saw Dr. Rizo last week.  MRI brain is stable with perhaps subtle findings but not clear-cut and no progressive evidence of metastasis.  He has had CyberKnife to the L2 spine.  He will get course #3 of Keytruda and axitinib today and sees my nurse practitioner back on 3/16/2020.  Would repeat his scans including MRI brainAnd CT chest abdomen and pelvis with contrast assuming creatinine allows along with total body bone scan.  Discussed with patient face-to-face 30 minutes greater than 50% spent counseling regarding this plan as outlined above.  Jose Coffey MD    02/24/2020

## 2020-02-25 NOTE — TELEPHONE ENCOUNTER
----- Message from Jose Coffey MD sent at 2/24/2020  6:43 PM EST -----  Regarding: Help  Savita, can you order a free T4 and a BMP for next week on him.    Tila, can you follow-up on those.  His TSH was up to 6 and his creatinine was up to 1.9.  We have asked him to push fluids but if his creatinines continuing to rise we may have to adjust his treatment and may need to stop immunotherapy and get him to nephrology for further evaluation and endocrinology for management of hypothyroidism that may be coming from his immunotherapy.

## 2020-03-02 PROBLEM — R09.02 HYPOXIA: Status: ACTIVE | Noted: 2020-01-01

## 2020-03-02 PROBLEM — R79.89 ELEVATED SERUM CREATININE: Status: ACTIVE | Noted: 2020-01-01

## 2020-03-02 PROBLEM — C79.9 METASTATIC CANCER (HCC): Status: ACTIVE | Noted: 2020-01-01

## 2020-03-03 NOTE — NURSING NOTE
Left thoracentesis performed by Dr Millan.  600 ml clear yellow fluid removed.  Pt tolerated well. Report called to 5B

## 2020-03-03 NOTE — PAYOR COMM NOTE
"Valerie Saenz, RN Utilization Review [phone]  Fax # [phone]      Notification of admission and initial clinicals. Status is inpatient.           Farnaz Evans (54 y.o. Male)     Date of Birth Social Security Number Address Home Phone MRN    1965  [id number] [id number] [phone] [id number]    Holiness Marital Status          None        Admission Date Admission Type Admitting Provider Attending Provider Department, Room/Bed    3/2/20 Emergency Graeme Rhodes DO Shields, Daniel Alan, DO [facility] 5B, N541/1    Discharge Date Discharge Disposition Discharge Destination                       Attending Provider:  Graeme Rhodes DO    Allergies:  No Known Allergies    Isolation:  None   Infection:  None   Code Status:  CPR    Ht:  167.6 cm (66\")   Wt:  87.1 kg (192 lb)    Admission Cmt:  None   Principal Problem:  Hypoxia [R09.02]                 Active Insurance as of 3/2/2020     Primary Coverage     Payor Plan Insurance Group Employer/Plan Group    ANTHEM MEDICAID AN[employer]M MEDICAID [id number]     Payor Plan Address Payor Plan Phone Number Payor Plan Fax Number Effective Dates    [address] [address] [phone]  2019 - None Entered    [facility] [id number]       Subscriber Name Subscriber Birth Date Member ID       FARNAZ EVANS 1965 [id number]                 Emergency Contacts      (Rel.) Home Phone Work Phone Mobile Phone    INDIO SINGH (Sister) [phone] -- [phone]               History & Physical      Gonzalez Alfaro MD at 19              [facility] Medicine Services  HISTORY AND PHYSICAL    Patient Name: Farnaz Evans  : 1965  MRN: [id number]  Primary Care Physician: Juan Mccarty MD  Date of admission: 3/2/2020      Subjective   Subjective     Chief Complaint:  Difficulty breathing    HPI:  Farnaz Evans is " "a 54 y.o. male with a medical history significant for HTN and metastatic kidney cancer (diagnosed in January 2019 with presenting symptom of back pain who presents to the ER today with worsening shortness of breath. His cancer appears to be progressive and widely metastatic (liver, bone, lung, adrenal) despite being on Keytruda and Inlyta, actively being treated by Dr. Coffey. Presents tonight with worsening shortness of breath over the past month but much worse over the past few days, especially with exertion. Talking causes incessant cough and causes a headache \"gives me a migraine\". Denies fever, chills, purulent sputum, productive cough. He is not having chest pain, palpitations, worsening lower extremity edema. Describes decreased appetite throughout the day today, but is hungry now; he has been tolerating oral fluids and does not describe any decreased urine output. He does not smoke, drink alcohol or use illicit drugs. He is compliant with his cancer treatment regimen. Currently denies pain and nausea. He will be admitted to the hospital medicine service for further evaluation and treatment.    ER workup:    Chest XR = worsening of airspace disease bilaterally, suggest either diffuse edema or pneumonia; small bilateral pleural effusions; heart is borderline enlarged    CT chest, soft tissue neck w/o contrast = moderate bilateral pleural effusions w/ adenopathy throughout the supraclavicular region bilaterally; concern for lymphangitic spread of disease versus diffuse infectious process of lung fields; involvement of the right kidney, bilateral adrenal glands, adenopathy in the retroperitoneum and extensive disease throughout the liver; tiny stone in the gallbladder    Labs = troponin neg x 2; proBNP 148.8; glu 110; creatinine 1.69; bun 21; eGFR 42; TSH 4.610; CBC unremarkable     EKG = NSR    Review of Systems   Constitutional: Positive for activity change and fatigue. Negative for appetite change, chills and " "fever.   Eyes: Negative.    Respiratory: Positive for cough and shortness of breath. Negative for chest tightness, wheezing and stridor.    Cardiovascular: Negative.    Gastrointestinal: Negative.    Endocrine: Negative.    Genitourinary: Negative.    Musculoskeletal: Negative.    Skin: Negative.    Allergic/Immunologic: Negative.    Neurological: Positive for headaches (w/ coughing, \"gives me a migraine\").   Hematological: Negative.    Psychiatric/Behavioral: Negative.         All other systems reviewed and are negative.     Personal History     Past Medical History:   Diagnosis Date   • Cancer (CMS/HCC)     Renal, Spine, Brain   • Hypertension        Past Surgical History:   Procedure Laterality Date   • CYBERKNIFE Right 01/09/2020    Right paraspinal lesion behind the L2 vertebral body- Dr. Michael Rizo    • KNEE SURGERY Left     x 3   • ROTATOR CUFF REPAIR Right 2014       Family History: family history includes Cancer in his father; Heart disease in his father. Otherwise pertinent FHx was reviewed and unremarkable.     Social History:  reports that he has never smoked. He has never used smokeless tobacco. He reports that he does not drink alcohol or use drugs.  Social History     Social History Narrative    Lives in [place]. 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        Medications:  Available home medication information reviewed.  Medications Prior to Admission   Medication Sig Dispense Refill Last Dose   • albuterol sulfate  (90 Base) MCG/ACT inhaler Inhale 2 puffs Every 4 (Four) Hours As Needed for Wheezing or Shortness of Air. 1 inhaler 0 3/2/2020 at Unknown time   • axitinib (INLYTA) 5 MG chemo tablet Take 1 tablet by mouth Every 12 (Twelve) Hours. 60 tablet 5 3/2/2020 at Unknown time   • hydroCHLOROthiazide (HYDRODIURIL) 25 MG tablet Take 1 tablet by mouth Daily. 30 tablet 0 3/2/2020 at Unknown time   • losartan (COZAAR) 50 MG tablet Take 100 mg by mouth Daily.   " 3/2/2020 at Unknown time   • metoprolol succinate XL (TOPROL-XL) 50 MG 24 hr tablet 100 mg Daily.   3/2/2020 at Unknown time   • benzonatate (TESSALON PERLES) 100 MG capsule Take 2 capsules by mouth 3 (Three) Times a Day As Needed for Cough. 90 capsule 0 Taking   • doxazosin (CARDURA) 8 MG tablet 2 tabs po daily (Patient taking differently: 16 mg Every Night.) 180 tablet 3 Taking   • ondansetron (ZOFRAN) 8 MG tablet Take 1 tablet by mouth Every 8 (Eight) Hours As Needed for Nausea or Vomiting. 30 tablet 3    • pantoprazole (PROTONIX) 40 MG EC tablet Take 1 tablet by mouth Daily. 30 tablet 0 Taking   • Pembrolizumab (KEYTRUDA IV) Infuse  into a venous catheter Every 21 (Twenty-One) Days.   Taking   • promethazine (PHENERGAN) 12.5 MG tablet Take 1 tablet by mouth Every 6 (Six) Hours As Needed for Nausea or Vomiting for up to 60 doses. 60 tablet 0 Taking   • traMADol (ULTRAM) 50 MG tablet Take 50 mg by mouth Every 6 (Six) Hours As Needed for Moderate Pain .   Taking       No Known Allergies    Objective   Objective     Vital Signs:   Temp:  [98.7 °F (37.1 °C)] 98.7 °F (37.1 °C)  Heart Rate:  [64-90] 90  Resp:  [12-16] 16  BP: (115-136)/(59-91) 136/85        Physical Exam   Constitutional: He is oriented to person, place, and time. He appears well-developed and well-nourished. He appears distressed (mildly distressed; difficult to carry on a conversation d/t coughing when talking).   HENT:   Head: Normocephalic and atraumatic.   Mouth/Throat: Oropharynx is clear and moist. No oropharyngeal exudate.   Eyes: Pupils are equal, round, and reactive to light. Conjunctivae and EOM are normal. No scleral icterus.   Neck: Normal range of motion. No JVD present.   Cardiovascular: Normal rate, regular rhythm, normal heart sounds and intact distal pulses.   No murmur heard.  Pulmonary/Chest: No accessory muscle usage. No tachypnea. He is in respiratory distress (mildly labored respirations at rest; increased respiratory effort when  talking; difficulty talking d/t coughing). He has decreased breath sounds in the right upper field, the right middle field, the right lower field, the left middle field and the left lower field. He has rales in the right lower field and the left lower field.   Abdominal: Soft. Bowel sounds are normal. He exhibits distension (mild, soft). He exhibits no mass. There is no tenderness. There is no guarding. No hernia.   Musculoskeletal: Normal range of motion. He exhibits edema (trace BLE). He exhibits no tenderness or deformity.   Neurological: He is alert and oriented to person, place, and time.   Skin: Skin is warm and dry. Capillary refill takes less than 2 seconds. No rash noted. No erythema. No pallor.   Psychiatric: He has a normal mood and affect. His behavior is normal. Judgment and thought content normal.      Results Reviewed:  I have personally reviewed current lab and radiology data.    Results from last 7 days   Lab Units 03/02/20  1655   WBC 10*3/mm3 6.89   HEMOGLOBIN g/dL 15.1   HEMATOCRIT % 43.8   PLATELETS 10*3/mm3 187     Results from last 7 days   Lab Units 03/02/20  1908 03/02/20  1655   SODIUM mmol/L  --  141   POTASSIUM mmol/L  --  4.3   CHLORIDE mmol/L  --  102   CO2 mmol/L  --  28.0   BUN mg/dL  --  21*   CREATININE mg/dL  --  1.69*   GLUCOSE mg/dL  --  110*   CALCIUM mg/dL  --  9.0   ALT (SGPT) U/L  --  15   AST (SGOT) U/L  --  16   TROPONIN T ng/mL <0.010 <0.010   PROBNP pg/mL  --  148.8     Estimated Creatinine Clearance: 51.5 mL/min (A) (by C-G formula based on SCr of 1.69 mg/dL (H)).  Brief Urine Lab Results  (Last result in the past 365 days)      Color   Clarity   Blood   Leuk Est   Nitrite   Protein   CREAT   Urine HCG        02/03/20 0919 Yellow Clear Negative Negative Negative Negative             Imaging Results (Last 24 Hours)     Procedure Component Value Units Date/Time    CT Soft Tissue Neck Without Contrast [[id number]] Collected:  03/02/20 1842     Updated:  03/02/20 1847     Narrative:       EXAMINATION: CT SOFT TISSUE NECK WO CONTRAST-, CT CHEST WO CONTRAST-      INDICATION: Lung CA, difficulty swallowing follow-up lung cancer,  difficulty swallowing     TECHNIQUE: Multiple axial CT imaging is obtained of the neck and chest  without the ministration of intravenous contrast.     The radiation dose reduction device was turned on for each scan per the  ALARA (As Low as Reasonably Achievable) protocol.     COMPARISON: NONE     FINDINGS: Thyroid is homogeneous in appearance. There is no significant  adenopathy identified within the soft tissues of the neck. There is old  lymph nodes identified throughout the supraclavicular regions  bilaterally which are mildly enlarged. There are findings concerning for  reactive adenopathy however metastatic disease cannot be excluded. The  largest lymph node seen in the supra clavicular region on the left  posteriorly is 1.5 cm. There is a lymph node identified at the base of  the right neck measuring 1.2 cm. No other adenopathy is identified. The  visualized paranasal sinuses are clear. The mastoid air cells are  patent. The chest reveals adenopathy throughout the mediastinum for  example there is a paratracheal lymph node on the right measuring 2.4  cm. Bilateral pleural effusions moderate in size with fibronodular  densities seen diffusely throughout the lung fields bilaterally.  Findings may suggest a diffuse airspace disease such as infiltrate  pneumonia however lymphangitic spread of disease cannot be completely  excluded and clinical correlation is needed. The cardiac chambers within  normal limits. No pericardial effusion. Visualized upper abdomen are  reveals abnormality identified within the right kidney. There is  normality identified within the adrenal gland on the right and left.  Multiple liver lesions are identified. There are stones seen in the  gallbladder. Adenopathy throughout the retroperitoneum. Splenule or soft  tissue implant  identified of the left upper quadrant. The bony  structures reveal gentle changes seen within the spine.             Impression:       Moderate size bilateral pleural effusions with adenopathy  throughout the supraclavicular region bilaterally. Tiny fibronodular  densities seen diffusely throughout the lung fields with some air  bronchogram centrally suggesting either lymphangitic spread of disease  or diffuse infectious process. Clinical correlation is needed. The upper  abdomen reveals extensive disease with involvement of the right kidney,  bilateral adrenal glands, adenopathy in the retroperitoneum as well as  extensive disease throughout the liver. Tiny stone identified within the  gallbladder.          CT Chest Without Contrast [[id number]] Collected:  03/02/20 1842     Updated:  03/02/20 1847    Narrative:       EXAMINATION: CT SOFT TISSUE NECK WO CONTRAST-, CT CHEST WO CONTRAST-      INDICATION: Lung CA, difficulty swallowing follow-up lung cancer,  difficulty swallowing     TECHNIQUE: Multiple axial CT imaging is obtained of the neck and chest  without the ministration of intravenous contrast.     The radiation dose reduction device was turned on for each scan per the  ALARA (As Low as Reasonably Achievable) protocol.     COMPARISON: NONE     FINDINGS: Thyroid is homogeneous in appearance. There is no significant  adenopathy identified within the soft tissues of the neck. There is old  lymph nodes identified throughout the supraclavicular regions  bilaterally which are mildly enlarged. There are findings concerning for  reactive adenopathy however metastatic disease cannot be excluded. The  largest lymph node seen in the supra clavicular region on the left  posteriorly is 1.5 cm. There is a lymph node identified at the base of  the right neck measuring 1.2 cm. No other adenopathy is identified. The  visualized paranasal sinuses are clear. The mastoid air cells are  patent. The chest reveals adenopathy  throughout the mediastinum for  example there is a paratracheal lymph node on the right measuring 2.4  cm. Bilateral pleural effusions moderate in size with fibronodular  densities seen diffusely throughout the lung fields bilaterally.  Findings may suggest a diffuse airspace disease such as infiltrate  pneumonia however lymphangitic spread of disease cannot be completely  excluded and clinical correlation is needed. The cardiac chambers within  normal limits. No pericardial effusion. Visualized upper abdomen are  reveals abnormality identified within the right kidney. There is  normality identified within the adrenal gland on the right and left.  Multiple liver lesions are identified. There are stones seen in the  gallbladder. Adenopathy throughout the retroperitoneum. Splenule or soft  tissue implant identified of the left upper quadrant. The bony  structures reveal gentle changes seen within the spine.             Impression:       Moderate size bilateral pleural effusions with adenopathy  throughout the supraclavicular region bilaterally. Tiny fibronodular  densities seen diffusely throughout the lung fields with some air  bronchogram centrally suggesting either lymphangitic spread of disease  or diffuse infectious process. Clinical correlation is needed. The upper  abdomen reveals extensive disease with involvement of the right kidney,  bilateral adrenal glands, adenopathy in the retroperitoneum as well as  extensive disease throughout the liver. Tiny stone identified within the  gallbladder.          XR Chest 1 View [[id number]] Collected:  03/02/20 1713     Updated:  03/02/20 1714    Narrative:          EXAMINATION: XR CHEST 1 VW-      INDICATION: SOA triage protocol      COMPARISON: NONE     FINDINGS: Portable chest reveals cardiac silhouette to be enlarged.  Increased pulmonary vascularity bilaterally. Small bladder pleural  effusions with minimal increased markings at the lung bases.  Degenerative changes  seen within the spine.           Impression:       Worsening of the airspace disease seen within the lung  fields bilaterally. Findings suggesting either diffuse edema or  pneumonia. Small bilateral pleural effusions. The heart is borderline  enlarged. Clinical correlation is needed.              Results for orders placed during the hospital encounter of 01/29/20   Adult Transthoracic Echo Complete W/ Cont if Necessary Per Protocol    Narrative · Estimated EF = 50-55%.  · Left ventricular systolic function is low normal.  · Mild tricuspid valve regurgitation is present.  · Calculated right ventricular systolic pressure from tricuspid   regurgitation is 27 mmHg.  · The global longitudinal strain is calculated at -17 which is slightly   less than normal (normal is -20)          Assessment/Plan   Assessment & Plan     Active Hospital Problems    Diagnosis POA   • **Hypoxia [R09.02] Yes   • Renal cancer, left (CMS/HCC) [C64.2] Yes   • Hypertension [I10] Yes     54 year old male with metastatic renal cell carcinoma presents to the ER with worsening shortness of breath; found to have bilateral pleural effusions on CT chest performed in ER as noted above. No fever, no chills, no chest pain, no nasal drainage, sneezing or itchy eyes or known sick contacts    Exertional Hypoxia (sats dropped to 85% w/ exertion in the ED)  - admit to med/surg inpatient w/ continuous pulse ox    Bilateral progressive malignant pleural effusions, left slightly > right  - oxygen PRN  - lasix 20 mg IV given in ER, monitor for effectiveness  - plan for CT guided thoracentesis of the left lung in am    Dry cough, likely due to symptomatic moderate bilateral pleural effusions  -I doubt has current infection (normal wbc, nonproductive cough) but will add 5 days doxycycline empirically in case atypical organism bronchitis    Metastatic renal cancer:  - consult Dr. Coffey with Hem/Onc  - continue PO chemo regimen  - manage pain PRN  - manage nausea  PRN    HTN:  - continue metoprolol, cardura to start in am w/ hold parameters  - watch BP closely overnight, may need to treat PRN if SBP >180    Elevated creatinine   - appears improved from last check 2/24/20 was 2.00, currently 1.69  -has large amount muscle mass so creatinine baseline expected to be higher than usual man of his weight    - repeat BMP in am    Plan:  -lasix 20mg iv in ed  -tussionex, antitussives  -doxycycline empiric rx  -plan therapeutic left thoracentesis tomorrow (ordered w/ some labs)  -if tolerates left side thoracentesis tomorrow, then perhaps right sided thoracentesis on Wednesday  -wean oxygen as tolerates  -Dr. Coffey to see Wednesday  -depending on rapidity of pleural fluid accumulation, decisions regarding prn therapeutic thoracenteses vs pleurx drains can be made      DVT prophylaxis:  SCDS    CODE STATUS:    Code Status and Medical Interventions:   Ordered at: 03/02/20 2052     Code Status:    CPR     Medical Interventions (Level of Support Prior to Arrest):    Full       Admission Status:  I believe this patient meets inpatient status due to hypoxia, symptomatic bilateral malignant pleural effusions which will require drainage sequentially, expected stay > 2 midnights    Electronically signed by LENNY Porras, 03/02/20, 8:26 PM.      Attending   Admission Attestation       I have seen and examined the patient, performing an independent face-to-face diagnostic evaluation with plan of care reviewed and developed with the advanced practice clinician (APC).      Brief Summary Statement:   Alphonso Evans is a 54 y.o. male w/ metastatic renal cancer (with biopsy proven liver mets, radiographic evidence bony mets, and lung mets, adrenal mets) who has been receiving keytruda (followed by Dr. Coffey) and has received cyberknife to L2 metastatic lesions by Dr. Rizo. Patient presents with increasing dyspnea over past 3-5 days. Initially dyspnea was w/ exertion, now lying flat.  Associated dry cough. No upper respiratory symptoms, no fever, no productive cough. In ED ct neck, chest shows advancement of fibronodular lung densities, increased supraclavicular adenopathy and retroperitoneal adenopathy w/ extensive liver mets, and larger bilateral moderate pleural effusions. In ED oxygen saturations would drop intermittently to 85% w/ coughing or movement.    Remainder of detailed HPI is as noted by APC and has been reviewed and/or edited by me for completeness.    Attending Physical Exam:  Constitutional:Alert, oriented x 3, nontoxic appearing, coughs frequently during exam (nonproductive), no increased work of breathing at rest  Psych:Normal/appropriate affect  HEENT:Ncat, oroph clear  Neck: neck supple, full range of motion  Neuro: Face symmetric, speech clear, equal , moves all extremities  Cardiac: Rrr; No pretibial pitting edema  Resp: decreased air movement bilateral mid & lower lung fields (no wheezing)  GI: abd soft, nontender  Skin: No extremity rash  Musculoskeletal/extremities: no cyanosis extremities; no significant ankle edema            Brief Assessment/Plan :  See detailed assessment and plan developed with APC which I have reviewed and/or edited for completeness.    Electronically signed by Gonzalez Alfaro MD, 03/02/20, 11:00 PM.                Electronically signed by Gonzalez Alfaro MD at 03/02/20 2308          Emergency Department Notes      Maryam May APRN at 03/02/20 2056     Attestation signed by Theodore Hui MD at 03/02/20 2257      ECG 12 Lead         ECG 12 Lead   Final Result   Test Reason : SOA   Blood Pressure : **/** mmHG   Vent. Rate : 077 BPM     Atrial Rate : 077 BPM      P-R Int : 154 ms          QRS Dur : 092 ms       QT Int : 356 ms       P-R-T Axes : 042 021 030 degrees      QTc Int : 402 ms      Normal sinus rhythm   Normal ECG   When compared with ECG of 10-FEB-2020 18:29,   Criteria for Inferior infarct are no longer present    Confirmed by RACHEL ROWE MD ([id number]) on 3/2/2020 10:45:56 PM      Referred By:  EDMD           Confirmed By:RACHEL ROWE MD              For this patient encounter, I reviewed the NP or PA documentation, treatment plan, and medical decision making. Rachel Rowe MD 3/2/2020 10:56 PM                  Subjective   Patient is a pleasant 54-year-old male with history of lung cancer with metastasis who presents to the ER today with complaints of dyspnea, increased dry cough, and lightheadedness.  States that for the past several weeks he has been short of breath but it is much worse over the past 24 hours.  Had a CT scan which showed small pleural effusions several weeks ago.  Is under the care of Dr. Coffey with oncology and currently is taking Keytruda IV once a month, and Inlyta twice a day.  Does not wear oxygen at home.  Denies any fevers, chills, nausea, vomiting, or diarrhea.  Denies any difficulty with urination      History provided by:  Patient  Shortness of Breath   Severity:  Moderate  Onset quality:  Gradual  Duration:  24 hours  Timing:  Constant  Progression:  Worsening  Chronicity:  Chronic  Context comment:  Shortness of breath at rest, but worse with activity  Relieved by:  Nothing  Worsened by:  Activity  Ineffective treatments:  Inhaler  Associated symptoms: cough    Associated symptoms: no abdominal pain, no chest pain, no fever, no hemoptysis, no sputum production, no syncope, no vomiting and no wheezing    Risk factors: hx of cancer        Review of Systems   Constitutional: Negative for chills and fever.   Respiratory: Positive for cough and shortness of breath. Negative for hemoptysis, sputum production, chest tightness and wheezing.    Cardiovascular: Negative for chest pain, palpitations and syncope.   Gastrointestinal: Negative for abdominal pain, diarrhea, nausea and vomiting.   Genitourinary: Negative for dysuria and frequency.   Neurological: Positive for light-headedness. Negative for  syncope.   All other systems reviewed and are negative.      Past Medical History:   Diagnosis Date   • Cancer (CMS/HCC)     Renal, Spine, Brain   • Hypertension        No Known Allergies    Past Surgical History:   Procedure Laterality Date   • CYBERKNIFE Right 01/09/2020    Right paraspinal lesion behind the L2 vertebral body- Dr. Michael Rizo    • KNEE SURGERY Left     x 3   • ROTATOR CUFF REPAIR Right 2014       Family History   Problem Relation Age of Onset   • Cancer Father    • Heart disease Father        Social History     Socioeconomic History   • Marital status:      Spouse name: Not on file   • Number of children: Not on file   • Years of education: Not on file   • Highest education level: Not on file   Tobacco Use   • Smoking status: Never Smoker   • Smokeless tobacco: Never Used   Substance and Sexual Activity   • Alcohol use: Never     Frequency: Never   • Drug use: Never   • Sexual activity: Defer   Social History Narrative    Lives in [place]. 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            Objective   Physical Exam   Constitutional: He is oriented to person, place, and time. He appears well-developed and well-nourished.   HENT:   Head: Normocephalic.   Neck: Neck supple. No JVD present.   Cardiovascular: Normal rate, regular rhythm and intact distal pulses.   Pulmonary/Chest: Effort normal and breath sounds normal. He has no wheezes. He has no rhonchi. He has no rales.   Abdominal: Soft. Bowel sounds are normal. There is no tenderness.   Musculoskeletal:        Right lower leg: Normal.        Left lower leg: Normal.   Neurological: He is alert and oriented to person, place, and time.   Skin: Skin is warm and dry. Capillary refill takes less than 2 seconds.   Psychiatric: He has a normal mood and affect. His behavior is normal.   Vitals reviewed.      Procedures          ED Course      Recent Results (from the past 24 hour(s))   Comprehensive Metabolic Panel     Collection Time: 03/02/20  4:55 PM   Result Value Ref Range    Glucose 110 (H) 65 - 99 mg/dL    BUN 21 (H) 6 - 20 mg/dL    Creatinine 1.69 (H) 0.76 - 1.27 mg/dL    Sodium 141 136 - 145 mmol/L    Potassium 4.3 3.5 - 5.2 mmol/L    Chloride 102 98 - 107 mmol/L    CO2 28.0 22.0 - 29.0 mmol/L    Calcium 9.0 8.6 - 10.5 mg/dL    Total Protein 6.4 6.0 - 8.5 g/dL    Albumin 3.50 3.50 - 5.20 g/dL    ALT (SGPT) 15 1 - 41 U/L    AST (SGOT) 16 1 - 40 U/L    Alkaline Phosphatase 67 39 - 117 U/L    Total Bilirubin 0.6 0.2 - 1.2 mg/dL    eGFR Non African Amer 42 (L) >60 mL/min/1.73    Globulin 2.9 gm/dL    A/G Ratio 1.2 g/dL    BUN/Creatinine Ratio 12.4 7.0 - 25.0    Anion Gap 11.0 5.0 - 15.0 mmol/L   BNP    Collection Time: 03/02/20  4:55 PM   Result Value Ref Range    proBNP 148.8 5.0 - 900.0 pg/mL   Troponin    Collection Time: 03/02/20  4:55 PM   Result Value Ref Range    Troponin T <0.010 0.000 - 0.030 ng/mL   Light Blue Top    Collection Time: 03/02/20  4:55 PM   Result Value Ref Range    Extra Tube hold for add-on    Green Top (Gel)    Collection Time: 03/02/20  4:55 PM   Result Value Ref Range    Extra Tube Hold for add-ons.    Lavender Top    Collection Time: 03/02/20  4:55 PM   Result Value Ref Range    Extra Tube hold for add-on    CBC Auto Differential    Collection Time: 03/02/20  4:55 PM   Result Value Ref Range    WBC 6.89 3.40 - 10.80 10*3/mm3    RBC 5.09 4.14 - 5.80 10*6/mm3    Hemoglobin 15.1 13.0 - 17.7 g/dL    Hematocrit 43.8 37.5 - 51.0 %    MCV 86.1 79.0 - 97.0 fL    MCH 29.7 26.6 - 33.0 pg    MCHC 34.5 31.5 - 35.7 g/dL    RDW 13.8 12.3 - 15.4 %    RDW-SD 42.7 37.0 - 54.0 fl    MPV 9.5 6.0 - 12.0 fL    Platelets 187 140 - 450 10*3/mm3    Neutrophil % 68.7 42.7 - 76.0 %    Lymphocyte % 20.2 19.6 - 45.3 %    Monocyte % 8.6 5.0 - 12.0 %    Eosinophil % 1.5 0.3 - 6.2 %    Basophil % 0.6 0.0 - 1.5 %    Immature Grans % 0.4 0.0 - 0.5 %    Neutrophils, Absolute 4.74 1.70 - 7.00 10*3/mm3    Lymphocytes, Absolute 1.39  0.70 - 3.10 10*3/mm3    Monocytes, Absolute 0.59 0.10 - 0.90 10*3/mm3    Eosinophils, Absolute 0.10 0.00 - 0.40 10*3/mm3    Basophils, Absolute 0.04 0.00 - 0.20 10*3/mm3    Immature Grans, Absolute 0.03 0.00 - 0.05 10*3/mm3    nRBC 0.0 0.0 - 0.2 /100 WBC   TSH    Collection Time: 03/02/20  4:55 PM   Result Value Ref Range    TSH 4.610 (H) 0.270 - 4.200 uIU/mL   Gold Top - SST    Collection Time: 03/02/20  4:56 PM   Result Value Ref Range    Extra Tube Hold for add-ons.    Troponin    Collection Time: 03/02/20  7:08 PM   Result Value Ref Range    Troponin T <0.010 0.000 - 0.030 ng/mL     Note: In addition to lab results from this visit, the labs listed above may include labs taken at another facility or during a different encounter within the last 24 hours. Please correlate lab times with ED admission and discharge times for further clarification of the services performed during this visit.    CT Soft Tissue Neck Without Contrast   Preliminary Result   Moderate size bilateral pleural effusions with adenopathy   throughout the supraclavicular region bilaterally. Tiny fibronodular   densities seen diffusely throughout the lung fields with some air   bronchogram centrally suggesting either lymphangitic spread of disease   or diffuse infectious process. Clinical correlation is needed. The upper   abdomen reveals extensive disease with involvement of the right kidney,   bilateral adrenal glands, adenopathy in the retroperitoneum as well as   extensive disease throughout the liver. Tiny stone identified within the   gallbladder.              CT Chest Without Contrast   Preliminary Result   Moderate size bilateral pleural effusions with adenopathy   throughout the supraclavicular region bilaterally. Tiny fibronodular   densities seen diffusely throughout the lung fields with some air   bronchogram centrally suggesting either lymphangitic spread of disease   or diffuse infectious process. Clinical correlation is needed. The  "upper   abdomen reveals extensive disease with involvement of the right kidney,   bilateral adrenal glands, adenopathy in the retroperitoneum as well as   extensive disease throughout the liver. Tiny stone identified within the   gallbladder.              XR Chest 1 View   Preliminary Result   Worsening of the airspace disease seen within the lung   fields bilaterally. Findings suggesting either diffuse edema or   pneumonia. Small bilateral pleural effusions. The heart is borderline   enlarged. Clinical correlation is needed.              CT Guided Thoracentesis    (Results Pending)     Vitals:    03/02/20 1643 03/02/20 1741 03/02/20 1800 03/02/20 2030   BP: 132/91  115/59 136/85   BP Location: Left arm      Patient Position: Sitting      Pulse: 70 64 84 90   Resp: 12 16     Temp: 98.7 °F (37.1 °C)      TempSrc: Oral      SpO2: 95% 93% 90% 93%   Weight: 88.5 kg (195 lb)      Height: 167.6 cm (66\")        Medications   sodium chloride 0.9 % flush 10 mL (has no administration in time range)   Hold medication (has no administration in time range)   albuterol (PROVENTIL) nebulizer solution 0.083% 2.5 mg/3mL (2.5 mg Nebulization Given 3/2/20 1741)   furosemide (LASIX) injection 20 mg (20 mg Intravenous Given 3/2/20 2021)     ECG/EMG Results (last 24 hours)     Procedure Component Value Units Date/Time    ECG 12 Lead [[id number]] Collected:  03/02/20 1657     Updated:  03/02/20 1739    ECG 12 Lead [[id number]] Collected:  03/02/20 1900     Updated:  03/02/20 1900        ECG 12 Lead         ECG 12 Lead           After nebulizer treatment, patient states that his breathing is now better.  Placed on oxygen.      Discussed patient case with Dr. Coffey with oncology who recommends admission and possible thoracentesis for pleural effusions.  Feels that along with the CT findings and patient's symptoms that his symptoms are more related to progression of his lung cancer.    Spoke with Dr. Alfaro with hospital medicine who will admit " the patient to [facility]    Final diagnoses:   Hypoxia   Bilateral pleural effusion   Dyspnea, unspecified type   Primary malignant neoplasm of lung metastatic to other site, unspecified laterality (CMS/HCC)            Maryam May, LENNY  03/02/20 2103      Electronically signed by Theodore Hui MD at 03/02/20 1937       Vital Signs (last day)     Date/Time   Temp   Temp src   Pulse   Resp   BP   Patient Position   SpO2    03/03/20 0844   --   --   --   --   --   --   96    03/03/20 0700   98.7 (37.1)   Oral   67   20   122/67   Lying   97    03/03/20 0339   99.7 (37.6)   Oral   66   16   119/73   Lying   97    03/03/20 0004   97.7 (36.5)   Oral   75   18   114/72   Lying   96    03/02/20 2100   98 (36.7)   Oral   78   20   129/71   Sitting   94    03/02/20 2030   --   --   90   --   136/85   --   93    03/02/20 1800   --   --   84   --   115/59   --   90    03/02/20 1741   --   --   64   16   --   --   93    03/02/20 1643   98.7 (37.1)   Oral   70   12   132/91   Sitting   95                Current Facility-Administered Medications   Medication Dose Route Frequency Provider Last Rate Last Dose   • acetaminophen (TYLENOL) tablet 650 mg  650 mg Oral Q4H PRN Gonzalez Alfaro MD        Or   • acetaminophen (TYLENOL) 160 MG/5ML solution 650 mg  650 mg Oral Q4H PRN Gonzalez Alfaro MD        Or   • acetaminophen (TYLENOL) suppository 650 mg  650 mg Rectal Q4H PRN Gonzalez Alfaro MD       • axitinib (INLYTA) chemo tablet 5 mg(PATIENT SUPPLIED med)  5 mg Oral Q12H Gonzalez Alfaro MD   5 mg at 03/02/20 2318   • benzonatate (TESSALON) capsule 200 mg  200 mg Oral TID PRN Gonzalez Alfaro MD       • doxycycline (MONODOX) capsule 100 mg  100 mg Oral Q12H Gonzalez Alfaro MD   100 mg at 03/03/20 0902   • guaiFENesin-dextromethorphan (ROBITUSSIN DM) 100-10 MG/5ML syrup 5 mL  5 mL Oral Q6H PRN Gonzalez Alfaro MD   5 mL at 03/02/20 0138   • Hold medication  1  each Does not apply Continuous PRN Shoshana Delarosa, APRN       • HYDROcod Polst-CPM Polst ER (TUSSIONEX PENNKINETIC) 10-8 MG/5ML ER suspension 2.5 mL  2.5 mL Oral Q12H PRN Gonzalez Alfaro MD       • ipratropium-albuterol (DUO-NEB) nebulizer solution 3 mL  3 mL Nebulization Q4H PRN Gonzalez Alfaro MD       • magnesium sulfate 4 gram infusion - Mg less than or equal to 1mg/dL  4 g Intravenous PRN Gonzalez Alfaro MD        Or   • magnesium sulfate 3 gram infusion (1gm x 3) - Mg 1.1 - 1.5 mg/dL  1 g Intravenous PRN Gonzalez Alfaro MD        Or   • Magnesium Sulfate 2 gram infusion- Mg 1.6 - 1.9 mg/dL  2 g Intravenous PRN Gonzalez Alfaro MD       • melatonin tablet 5 mg  5 mg Oral Nightly PRN Gonzalez Alfaro MD   5 mg at 03/02/20 2324   • metoprolol succinate XL (TOPROL-XL) 24 hr tablet 100 mg  100 mg Oral Daily Gonzalez Alfaro MD   100 mg at 03/03/20 0902   • pantoprazole (PROTONIX) EC tablet 40 mg  40 mg Oral Daily Gonzalez Alfaro MD   40 mg at 03/03/20 0902   • promethazine (PHENERGAN) tablet 12.5 mg  12.5 mg Oral Q6H PRN Shoshana Delarosa, APRN       • sodium chloride 0.9 % flush 10 mL  10 mL Intravenous PRN Theodore Hui MD       • sodium chloride 0.9 % flush 10 mL  10 mL Intravenous Q12H Gonzalez Alfaro MD   10 mL at 03/03/20 0901   • sodium chloride 0.9 % flush 10 mL  10 mL Intravenous PRN Gonzalez Alfaro MD       • terazosin (HYTRIN) capsule 5 mg  5 mg Oral Q12H Gonzalez Alfaro MD   5 mg at 03/03/20 0902   • traMADol (ULTRAM) tablet 50 mg  50 mg Oral Q6H PRN Gonzalez Alfaro MD         Orders (all)      Start     Ordered    03/03/20 0900  hydroCHLOROthiazide (HYDRODIURIL) tablet 25 mg  Daily,   Status:  Discontinued      03/02/20 2131 03/03/20 0900  terazosin (HYTRIN) capsule 5 mg  Every 12 Hours Scheduled      03/02/20 2253 03/03/20 0900  metoprolol succinate XL (TOPROL-XL) 24 hr tablet 100 mg  Daily      03/02/20 2253 03/03/20 0900  pantoprazole  (PROTONIX) EC tablet 40 mg  Daily      03/02/20 2253 03/03/20 0702  Inpatient Hematology & Oncology Consult  IN AM     Specialty:  Hematology and Oncology  Provider:  Jose Coffey MD    03/02/20 2106 03/03/20 0600  aPTT  Morning Draw      03/02/20 2253 03/03/20 0600  Basic Metabolic Panel  Morning Draw      03/02/20 2106 03/03/20 0600  CBC Auto Differential  Morning Draw      03/02/20 2106 03/03/20 0600  Magnesium  Morning Draw      03/02/20 2251    03/03/20 0001  NPO Diet  Diet Effective Midnight      03/02/20 2106 03/03/20 0000  Vital Signs  Every 4 Hours      03/02/20 2106 03/03/20 0000  Inpatient Case Management  Consult  Once     Provider:  (Not yet assigned)    03/02/20 2106 03/03/20 0000  CT Guided Thoracentesis  1 Time Imaging      03/02/20 2054 03/02/20 2345  axitinib (INLYTA) chemo tablet 5 mg(PATIENT SUPPLIED med)  Every 12 Hours      03/02/20 2253 03/02/20 2345  doxycycline (MONODOX) capsule 100 mg  Every 12 Hours Scheduled      03/02/20 2251 03/02/20 2254  Obtain Informed Consent  Once      03/02/20 2253 03/02/20 2254  Body Fluid Cell Count With Differential - Pleural Fluid, Pleural Cavity  STAT      03/02/20 2253 03/02/20 2254  pH, Body Fluid - Pleural Fluid, Pleural Cavity  STAT      03/02/20 2253    03/02/20 2254  Protein, Body Fluid - Pleural Fluid, Pleural Cavity  STAT      03/02/20 2253 03/02/20 2254  Lactate Dehydrogenase, Body Fluid - Pleural Fluid, Pleural Cavity  STAT      03/02/20 2253 03/02/20 2254  Glucose, Body Fluid - Pleural Fluid, Pleural Cavity  STAT      03/02/20 2253    03/02/20 2254  Body Fluid Culture - Body Fluid, Pleural Cavity  STAT      03/02/20 2253 03/02/20 2254  KOH Prep - Body Fluid, Pleural Cavity  STAT      03/02/20 2253    03/02/20 2254  Body fluid cell count - Pleural Fluid, Pleural Cavity  PROCEDURE ONCE      03/02/20 2253 03/02/20 2253  ipratropium-albuterol (DUO-NEB) nebulizer solution 3 mL  Every 4  Hours PRN      03/02/20 2253    03/02/20 2253  benzonatate (TESSALON) capsule 200 mg  3 Times Daily PRN,   Status:  Discontinued      03/02/20 2253 03/02/20 2253  traMADol (ULTRAM) tablet 50 mg  Every 6 Hours PRN      03/02/20 2253    03/02/20 2252  Patient Currently On Electrolyte Replacement Protocol - Please Refer to MAR for Protocol Details  Misc Nursing Order (Specify)  Daily     Comments:  Patient Currently On Electrolyte Replacement Protocol - Please Refer to MAR for Protocol Details    03/02/20 2251 03/02/20 2250  magnesium sulfate 4 gram infusion - Mg less than or equal to 1mg/dL  As Needed      03/02/20 2251 03/02/20 2250  magnesium sulfate 3 gram infusion (1gm x 3) - Mg 1.1 - 1.5 mg/dL  As Needed      03/02/20 2251 03/02/20 2250  Magnesium Sulfate 2 gram infusion- Mg 1.6 - 1.9 mg/dL  As Needed      03/02/20 2251 03/02/20 2250  Continuous pulse oximetry  Continuous      03/02/20 2251 03/02/20 2250  Continuous pulse oximetry  Nursing Communication  Once     Comments:  Continuous pulse oximetry    03/02/20 2251 03/02/20 2249  Inpatient Admission  Once      03/02/20 2251 03/02/20 2248  benzonatate (TESSALON) capsule 200 mg  3 Times Daily PRN      03/02/20 2251 03/02/20 2248  guaiFENesin-dextromethorphan (ROBITUSSIN DM) 100-10 MG/5ML syrup 5 mL  Every 6 Hours PRN      03/02/20 2251 03/02/20 2248  HYDROcod Polst-CPM Polst ER (TUSSIONEX PENNKINETIC) 10-8 MG/5ML ER suspension 2.5 mL  Every 12 Hours PRN      03/02/20 2251 03/02/20 2200  sodium chloride 0.9 % flush 10 mL  Every 12 Hours Scheduled      03/02/20 2106 03/02/20 2200  Strict Intake & Output  Every Hour      03/02/20 2106 03/02/20 2141  promethazine (PHENERGAN) tablet 12.5 mg  Every 6 Hours PRN      03/02/20 2141 03/02/20 2116  Protime-INR  Once      03/02/20 2106 03/02/20 2116  aPTT  Once      03/02/20 2106 03/02/20 2107  Intake & Output  Every Shift      03/02/20 2106 03/02/20 2107  Weigh Patient  Once       03/02/20 2106 03/02/20 2107  Oxygen Therapy- Nasal Cannula; Titrate for SPO2: 90% - 95%  Continuous      03/02/20 2106 03/02/20 2107  Insert Peripheral IV  Once      03/02/20 2106 03/02/20 2107  Saline Lock & Maintain IV Access  Continuous      03/02/20 2106 03/02/20 2107  Place Sequential Compression Device  Once      03/02/20 2106 03/02/20 2107  Maintain Sequential Compression Device  Continuous      03/02/20 2106 03/02/20 2107  Pulse Oximetry, Continuous  Continuous,   Status:  Canceled      03/02/20 2106 03/02/20 2107  Daily Weights  Daily      03/02/20 2106 03/02/20 2107  Follow Standard Precautions  Once      03/02/20 2106 03/02/20 2107  Fall Precautions  Continuous      03/02/20 2106 03/02/20 2107  Notify Provider (With Default Parameters)  Until Discontinued      03/02/20 2106 03/02/20 2107  Diet Regular  Diet Effective Now,   Status:  Canceled      03/02/20 2106 03/02/20 2106  sodium chloride 0.9 % flush 10 mL  As Needed      03/02/20 2106 03/02/20 2106  acetaminophen (TYLENOL) tablet 650 mg  Every 4 Hours PRN      03/02/20 2106 03/02/20 2106  acetaminophen (TYLENOL) 160 MG/5ML solution 650 mg  Every 4 Hours PRN      03/02/20 2106 03/02/20 2106  acetaminophen (TYLENOL) suppository 650 mg  Every 4 Hours PRN      03/02/20 2106 03/02/20 2106  melatonin tablet 5 mg  Nightly PRN      03/02/20 2106 03/02/20 2052  Hold medication  Continuous PRN      03/02/20 2054 03/02/20 2051  Inpatient Consult to Advance Care Planning  Once     Provider:  (Not yet assigned)    03/02/20 2051 03/02/20 2050  Code Status and Medical Interventions:  Continuous      03/02/20 2052 03/02/20 2015  Initiate Observation Status  Once     Comments:  Med surg    03/02/20 2015 03/02/20 2004  Initiate Observation Status  Once     Comments:  [facility]. Med surgery    03/02/20 2003 03/02/20 1958  furosemide (LASIX) injection 20 mg  Once      03/02/20 1956 03/02/20 1958  Med /  Surg Bed Request  Once      03/02/20 1957 03/02/20 1919  Check Pulse Oximetry while ambulating  Once     Comments:  Notify physician of result and patient tolerance.    03/02/20 1918    03/02/20 1855  Troponin  STAT      03/02/20 1828    03/02/20 1855  ECG 12 Lead  STAT      03/02/20 1828    03/02/20 1819  CT Soft Tissue Neck Without Contrast  1 Time Imaging      03/02/20 1819    03/02/20 1819  CT Chest Without Contrast  1 Time Imaging      03/02/20 1819    03/02/20 1815  Ambulate Patient - Report tolerance to provider  Once      03/02/20 1814    03/02/20 1813  Oxygen Therapy- Nasal Cannula; 2 LPM; Titrate for SPO2: equal to or greater than, 92%  Continuous PRN,   Status:  Canceled      03/02/20 1813    03/02/20 1813  TSH  STAT      03/02/20 1812    03/02/20 1732  albuterol (PROVENTIL) nebulizer solution 0.083% 2.5 mg/3mL  Once      03/02/20 1730    03/02/20 1655  NPO Diet  Diet Effective Now,   Status:  Canceled      03/02/20 1654    03/02/20 1655  Undress and Gown  Once      03/02/20 1654    03/02/20 1655  Cardiac monitoring  Per Hospital Policy      03/02/20 1654    03/02/20 1655  Continuous Pulse Oximetry  Continuous,   Status:  Canceled      03/02/20 1654    03/02/20 1655  Vital Signs  Per Hospital Policy      03/02/20 1654    03/02/20 1655  XR Chest 1 View  1 Time Imaging      03/02/20 1654    03/02/20 1655  Insert peripheral IV  Once      03/02/20 1654    03/02/20 1655  [place] Draw  Once      03/02/20 1654    03/02/20 1655  CBC & Differential  Once      03/02/20 1654    03/02/20 1655  Comprehensive Metabolic Panel  Once      03/02/20 1654    03/02/20 1655  BNP  Once      03/02/20 1654    03/02/20 1655  Troponin  Once      03/02/20 1654    03/02/20 1655  Light Blue Top  PROCEDURE ONCE      03/02/20 1654    03/02/20 1655  Green Top (Gel)  PROCEDURE ONCE      03/02/20 1654 03/02/20 1655  Lavender Top  PROCEDURE ONCE      03/02/20 1654 03/02/20 1655  Gold Top - SST  PROCEDURE ONCE      03/02/20 1654     03/02/20 1655  CBC Auto Differential  PROCEDURE ONCE      03/02/20 1654    03/02/20 1654  Oxygen Therapy- Nasal Cannula; 2 LPM; Titrate for SPO2: equal to or greater than, 92%  Continuous PRN,   Status:  Canceled      03/02/20 1654    03/02/20 1654  sodium chloride 0.9 % flush 10 mL  As Needed      03/02/20 1654    03/02/20 1652  ECG 12 Lead  Once      03/02/20 1651    Unscheduled  Up With Assistance  As Needed      03/02/20 2106    Unscheduled  Follow Acute Urinary Retention Protocol  As Needed      03/02/20 2106    Unscheduled  Magnesium  As Needed      03/02/20 2251    Unscheduled  Potassium  As Needed      03/02/20 2251    Signed and Held  Albumin, Fluid - Pleural Fluid, Pleural Cavity  STAT,   Status:  Canceled      Signed and Held    Signed and Held  Triglycerides, Body Fluid - Pleural Fluid, Pleural Cavity  STAT,   Status:  Canceled      Signed and Held    Signed and Held  Cholesterol, Body Fluid - Pleural Fluid, Pleural Cavity  STAT,   Status:  Canceled      Signed and Held    Signed and Held  AFB Culture - Body Fluid, Pleural Cavity  STAT,   Status:  Canceled      Signed and Held    Signed and Held  Anaerobic Culture - Pleural Fluid, Pleural Cavity  STAT,   Status:  Canceled      Signed and Held    Signed and Held  Fungus Culture - Body Fluid, Pleural Cavity  STAT,   Status:  Canceled      Signed and Held    Signed and Held  Fungus Smear - Body Fluid, Pleural Cavity  STAT,   Status:  Canceled      Signed and Held    Signed and Held  Amylase, Body Fluid - Pleural Fluid, Pleural Cavity  STAT,   Status:  Canceled      Signed and Held                Operative/Procedure Notes (all)    No notes of this type exist for this encounter.         Physician Progress Notes (all)    No notes of this type exist for this encounter.         Consult Notes (all)    No notes of this type exist for this encounter.

## 2020-03-03 NOTE — H&P
"    [facility] Medicine Services  HISTORY AND PHYSICAL    Patient Name: Alphonso Evans  : 1965  MRN: [id number]  Primary Care Physician: Juan Mccarty MD  Date of admission: 3/2/2020      Subjective   Subjective     Chief Complaint:  Difficulty breathing    HPI:  Alphonso Evans is a 54 y.o. male with a medical history significant for HTN and metastatic kidney cancer (diagnosed in 2019 with presenting symptom of back pain who presents to the ER today with worsening shortness of breath. His cancer appears to be progressive and widely metastatic (liver, bone, lung, adrenal) despite being on Keytruda and Inlyta, actively being treated by Dr. Coffey. Presents tonight with worsening shortness of breath over the past month but much worse over the past few days, especially with exertion. Talking causes incessant cough and causes a headache \"gives me a migraine\". Denies fever, chills, purulent sputum, productive cough. He is not having chest pain, palpitations, worsening lower extremity edema. Describes decreased appetite throughout the day today, but is hungry now; he has been tolerating oral fluids and does not describe any decreased urine output. He does not smoke, drink alcohol or use illicit drugs. He is compliant with his cancer treatment regimen. Currently denies pain and nausea. He will be admitted to the hospital medicine service for further evaluation and treatment.    ER workup:    Chest XR = worsening of airspace disease bilaterally, suggest either diffuse edema or pneumonia; small bilateral pleural effusions; heart is borderline enlarged    CT chest, soft tissue neck w/o contrast = moderate bilateral pleural effusions w/ adenopathy throughout the supraclavicular region bilaterally; concern for lymphangitic spread of disease versus diffuse infectious process of lung fields; involvement of the right kidney, bilateral adrenal glands, adenopathy in the " "retroperitoneum and extensive disease throughout the liver; tiny stone in the gallbladder    Labs = troponin neg x 2; proBNP 148.8; glu 110; creatinine 1.69; bun 21; eGFR 42; TSH 4.610; CBC unremarkable     EKG = NSR    Review of Systems   Constitutional: Positive for activity change and fatigue. Negative for appetite change, chills and fever.   Eyes: Negative.    Respiratory: Positive for cough and shortness of breath. Negative for chest tightness, wheezing and stridor.    Cardiovascular: Negative.    Gastrointestinal: Negative.    Endocrine: Negative.    Genitourinary: Negative.    Musculoskeletal: Negative.    Skin: Negative.    Allergic/Immunologic: Negative.    Neurological: Positive for headaches (w/ coughing, \"gives me a migraine\").   Hematological: Negative.    Psychiatric/Behavioral: Negative.         All other systems reviewed and are negative.     Personal History     Past Medical History:   Diagnosis Date   • Cancer (CMS/HCC)     Renal, Spine, Brain   • Hypertension        Past Surgical History:   Procedure Laterality Date   • CYBERKNIFE Right 01/09/2020    Right paraspinal lesion behind the L2 vertebral body- Dr. Michael Rizo    • KNEE SURGERY Left     x 3   • ROTATOR CUFF REPAIR Right 2014       Family History: family history includes Cancer in his father; Heart disease in his father. Otherwise pertinent FHx was reviewed and unremarkable.     Social History:  reports that he has never smoked. He has never used smokeless tobacco. He reports that he does not drink alcohol or use drugs.  Social History     Social History Narrative    Lives in [place]. 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        Medications:  Available home medication information reviewed.  Medications Prior to Admission   Medication Sig Dispense Refill Last Dose   • albuterol sulfate  (90 Base) MCG/ACT inhaler Inhale 2 puffs Every 4 (Four) Hours As Needed for Wheezing or Shortness of Air. 1 inhaler 0 " 3/2/2020 at Unknown time   • axitinib (INLYTA) 5 MG chemo tablet Take 1 tablet by mouth Every 12 (Twelve) Hours. 60 tablet 5 3/2/2020 at Unknown time   • hydroCHLOROthiazide (HYDRODIURIL) 25 MG tablet Take 1 tablet by mouth Daily. 30 tablet 0 3/2/2020 at Unknown time   • losartan (COZAAR) 50 MG tablet Take 100 mg by mouth Daily.   3/2/2020 at Unknown time   • metoprolol succinate XL (TOPROL-XL) 50 MG 24 hr tablet 100 mg Daily.   3/2/2020 at Unknown time   • benzonatate (TESSALON PERLES) 100 MG capsule Take 2 capsules by mouth 3 (Three) Times a Day As Needed for Cough. 90 capsule 0 Taking   • doxazosin (CARDURA) 8 MG tablet 2 tabs po daily (Patient taking differently: 16 mg Every Night.) 180 tablet 3 Taking   • ondansetron (ZOFRAN) 8 MG tablet Take 1 tablet by mouth Every 8 (Eight) Hours As Needed for Nausea or Vomiting. 30 tablet 3    • pantoprazole (PROTONIX) 40 MG EC tablet Take 1 tablet by mouth Daily. 30 tablet 0 Taking   • Pembrolizumab (KEYTRUDA IV) Infuse  into a venous catheter Every 21 (Twenty-One) Days.   Taking   • promethazine (PHENERGAN) 12.5 MG tablet Take 1 tablet by mouth Every 6 (Six) Hours As Needed for Nausea or Vomiting for up to 60 doses. 60 tablet 0 Taking   • traMADol (ULTRAM) 50 MG tablet Take 50 mg by mouth Every 6 (Six) Hours As Needed for Moderate Pain .   Taking       No Known Allergies    Objective   Objective     Vital Signs:   Temp:  [98.7 °F (37.1 °C)] 98.7 °F (37.1 °C)  Heart Rate:  [64-90] 90  Resp:  [12-16] 16  BP: (115-136)/(59-91) 136/85        Physical Exam   Constitutional: He is oriented to person, place, and time. He appears well-developed and well-nourished. He appears distressed (mildly distressed; difficult to carry on a conversation d/t coughing when talking).   HENT:   Head: Normocephalic and atraumatic.   Mouth/Throat: Oropharynx is clear and moist. No oropharyngeal exudate.   Eyes: Pupils are equal, round, and reactive to light. Conjunctivae and EOM are normal. No  scleral icterus.   Neck: Normal range of motion. No JVD present.   Cardiovascular: Normal rate, regular rhythm, normal heart sounds and intact distal pulses.   No murmur heard.  Pulmonary/Chest: No accessory muscle usage. No tachypnea. He is in respiratory distress (mildly labored respirations at rest; increased respiratory effort when talking; difficulty talking d/t coughing). He has decreased breath sounds in the right upper field, the right middle field, the right lower field, the left middle field and the left lower field. He has rales in the right lower field and the left lower field.   Abdominal: Soft. Bowel sounds are normal. He exhibits distension (mild, soft). He exhibits no mass. There is no tenderness. There is no guarding. No hernia.   Musculoskeletal: Normal range of motion. He exhibits edema (trace BLE). He exhibits no tenderness or deformity.   Neurological: He is alert and oriented to person, place, and time.   Skin: Skin is warm and dry. Capillary refill takes less than 2 seconds. No rash noted. No erythema. No pallor.   Psychiatric: He has a normal mood and affect. His behavior is normal. Judgment and thought content normal.      Results Reviewed:  I have personally reviewed current lab and radiology data.    Results from last 7 days   Lab Units 03/02/20  1655   WBC 10*3/mm3 6.89   HEMOGLOBIN g/dL 15.1   HEMATOCRIT % 43.8   PLATELETS 10*3/mm3 187     Results from last 7 days   Lab Units 03/02/20  1908 03/02/20  1655   SODIUM mmol/L  --  141   POTASSIUM mmol/L  --  4.3   CHLORIDE mmol/L  --  102   CO2 mmol/L  --  28.0   BUN mg/dL  --  21*   CREATININE mg/dL  --  1.69*   GLUCOSE mg/dL  --  110*   CALCIUM mg/dL  --  9.0   ALT (SGPT) U/L  --  15   AST (SGOT) U/L  --  16   TROPONIN T ng/mL <0.010 <0.010   PROBNP pg/mL  --  148.8     Estimated Creatinine Clearance: 51.5 mL/min (A) (by C-G formula based on SCr of 1.69 mg/dL (H)).  Brief Urine Lab Results  (Last result in the past 365 days)      Color    Clarity   Blood   Leuk Est   Nitrite   Protein   CREAT   Urine HCG        02/03/20 0919 Yellow Clear Negative Negative Negative Negative             Imaging Results (Last 24 Hours)     Procedure Component Value Units Date/Time    CT Soft Tissue Neck Without Contrast [[id number]] Collected:  03/02/20 1842     Updated:  03/02/20 1847    Narrative:       EXAMINATION: CT SOFT TISSUE NECK WO CONTRAST-, CT CHEST WO CONTRAST-      INDICATION: Lung CA, difficulty swallowing follow-up lung cancer,  difficulty swallowing     TECHNIQUE: Multiple axial CT imaging is obtained of the neck and chest  without the ministration of intravenous contrast.     The radiation dose reduction device was turned on for each scan per the  ALARA (As Low as Reasonably Achievable) protocol.     COMPARISON: NONE     FINDINGS: Thyroid is homogeneous in appearance. There is no significant  adenopathy identified within the soft tissues of the neck. There is old  lymph nodes identified throughout the supraclavicular regions  bilaterally which are mildly enlarged. There are findings concerning for  reactive adenopathy however metastatic disease cannot be excluded. The  largest lymph node seen in the supra clavicular region on the left  posteriorly is 1.5 cm. There is a lymph node identified at the base of  the right neck measuring 1.2 cm. No other adenopathy is identified. The  visualized paranasal sinuses are clear. The mastoid air cells are  patent. The chest reveals adenopathy throughout the mediastinum for  example there is a paratracheal lymph node on the right measuring 2.4  cm. Bilateral pleural effusions moderate in size with fibronodular  densities seen diffusely throughout the lung fields bilaterally.  Findings may suggest a diffuse airspace disease such as infiltrate  pneumonia however lymphangitic spread of disease cannot be completely  excluded and clinical correlation is needed. The cardiac chambers within  normal limits. No pericardial  effusion. Visualized upper abdomen are  reveals abnormality identified within the right kidney. There is  normality identified within the adrenal gland on the right and left.  Multiple liver lesions are identified. There are stones seen in the  gallbladder. Adenopathy throughout the retroperitoneum. Splenule or soft  tissue implant identified of the left upper quadrant. The bony  structures reveal gentle changes seen within the spine.             Impression:       Moderate size bilateral pleural effusions with adenopathy  throughout the supraclavicular region bilaterally. Tiny fibronodular  densities seen diffusely throughout the lung fields with some air  bronchogram centrally suggesting either lymphangitic spread of disease  or diffuse infectious process. Clinical correlation is needed. The upper  abdomen reveals extensive disease with involvement of the right kidney,  bilateral adrenal glands, adenopathy in the retroperitoneum as well as  extensive disease throughout the liver. Tiny stone identified within the  gallbladder.          CT Chest Without Contrast [[id number]] Collected:  03/02/20 1842     Updated:  03/02/20 1847    Narrative:       EXAMINATION: CT SOFT TISSUE NECK WO CONTRAST-, CT CHEST WO CONTRAST-      INDICATION: Lung CA, difficulty swallowing follow-up lung cancer,  difficulty swallowing     TECHNIQUE: Multiple axial CT imaging is obtained of the neck and chest  without the ministration of intravenous contrast.     The radiation dose reduction device was turned on for each scan per the  ALARA (As Low as Reasonably Achievable) protocol.     COMPARISON: NONE     FINDINGS: Thyroid is homogeneous in appearance. There is no significant  adenopathy identified within the soft tissues of the neck. There is old  lymph nodes identified throughout the supraclavicular regions  bilaterally which are mildly enlarged. There are findings concerning for  reactive adenopathy however metastatic disease cannot be  excluded. The  largest lymph node seen in the supra clavicular region on the left  posteriorly is 1.5 cm. There is a lymph node identified at the base of  the right neck measuring 1.2 cm. No other adenopathy is identified. The  visualized paranasal sinuses are clear. The mastoid air cells are  patent. The chest reveals adenopathy throughout the mediastinum for  example there is a paratracheal lymph node on the right measuring 2.4  cm. Bilateral pleural effusions moderate in size with fibronodular  densities seen diffusely throughout the lung fields bilaterally.  Findings may suggest a diffuse airspace disease such as infiltrate  pneumonia however lymphangitic spread of disease cannot be completely  excluded and clinical correlation is needed. The cardiac chambers within  normal limits. No pericardial effusion. Visualized upper abdomen are  reveals abnormality identified within the right kidney. There is  normality identified within the adrenal gland on the right and left.  Multiple liver lesions are identified. There are stones seen in the  gallbladder. Adenopathy throughout the retroperitoneum. Splenule or soft  tissue implant identified of the left upper quadrant. The bony  structures reveal gentle changes seen within the spine.             Impression:       Moderate size bilateral pleural effusions with adenopathy  throughout the supraclavicular region bilaterally. Tiny fibronodular  densities seen diffusely throughout the lung fields with some air  bronchogram centrally suggesting either lymphangitic spread of disease  or diffuse infectious process. Clinical correlation is needed. The upper  abdomen reveals extensive disease with involvement of the right kidney,  bilateral adrenal glands, adenopathy in the retroperitoneum as well as  extensive disease throughout the liver. Tiny stone identified within the  gallbladder.          XR Chest 1 View [[id number]] Collected:  03/02/20 1713     Updated:  03/02/20 1714     Narrative:          EXAMINATION: XR CHEST 1 VW-      INDICATION: SOA triage protocol      COMPARISON: NONE     FINDINGS: Portable chest reveals cardiac silhouette to be enlarged.  Increased pulmonary vascularity bilaterally. Small bladder pleural  effusions with minimal increased markings at the lung bases.  Degenerative changes seen within the spine.           Impression:       Worsening of the airspace disease seen within the lung  fields bilaterally. Findings suggesting either diffuse edema or  pneumonia. Small bilateral pleural effusions. The heart is borderline  enlarged. Clinical correlation is needed.              Results for orders placed during the hospital encounter of 01/29/20   Adult Transthoracic Echo Complete W/ Cont if Necessary Per Protocol    Narrative · Estimated EF = 50-55%.  · Left ventricular systolic function is low normal.  · Mild tricuspid valve regurgitation is present.  · Calculated right ventricular systolic pressure from tricuspid   regurgitation is 27 mmHg.  · The global longitudinal strain is calculated at -17 which is slightly   less than normal (normal is -20)          Assessment/Plan   Assessment & Plan     Active Hospital Problems    Diagnosis POA   • **Hypoxia [R09.02] Yes   • Renal cancer, left (CMS/HCC) [C64.2] Yes   • Hypertension [I10] Yes     54 year old male with metastatic renal cell carcinoma presents to the ER with worsening shortness of breath; found to have bilateral pleural effusions on CT chest performed in ER as noted above. No fever, no chills, no chest pain, no nasal drainage, sneezing or itchy eyes or known sick contacts    Exertional Hypoxia (sats dropped to 85% w/ exertion in the ED)  - admit to med/surg inpatient w/ continuous pulse ox    Bilateral progressive malignant pleural effusions, left slightly > right  - oxygen PRN  - lasix 20 mg IV given in ER, monitor for effectiveness  - plan for CT guided thoracentesis of the left lung in am    Dry cough, likely due  to symptomatic moderate bilateral pleural effusions  -I doubt has current infection (normal wbc, nonproductive cough) but will add 5 days doxycycline empirically in case atypical organism bronchitis    Metastatic renal cancer:  - consult Dr. Coffey with Hem/Onc  - continue PO chemo regimen  - manage pain PRN  - manage nausea PRN    HTN:  - continue metoprolol, cardura to start in am w/ hold parameters  - watch BP closely overnight, may need to treat PRN if SBP >180    Elevated creatinine   - appears improved from last check 2/24/20 was 2.00, currently 1.69  -has large amount muscle mass so creatinine baseline expected to be higher than usual man of his weight    - repeat BMP in am    Plan:  -lasix 20mg iv in ed  -tussionex, antitussives  -doxycycline empiric rx  -plan therapeutic left thoracentesis tomorrow (ordered w/ some labs)  -if tolerates left side thoracentesis tomorrow, then perhaps right sided thoracentesis on Wednesday  -wean oxygen as tolerates  -Dr. Coffey to see Wednesday  -depending on rapidity of pleural fluid accumulation, decisions regarding prn therapeutic thoracenteses vs pleurx drains can be made      DVT prophylaxis:  SCDS    CODE STATUS:    Code Status and Medical Interventions:   Ordered at: 03/02/20 2052     Code Status:    CPR     Medical Interventions (Level of Support Prior to Arrest):    Full       Admission Status:  I believe this patient meets inpatient status due to hypoxia, symptomatic bilateral malignant pleural effusions which will require drainage sequentially, expected stay > 2 midnights    Electronically signed by LENNY Porras, 03/02/20, 8:26 PM.      Attending   Admission Attestation       I have seen and examined the patient, performing an independent face-to-face diagnostic evaluation with plan of care reviewed and developed with the advanced practice clinician (APC).      Brief Summary Statement:   Alphonso Evans is a 54 y.o. male w/ metastatic renal cancer (with  biopsy proven liver mets, radiographic evidence bony mets, and lung mets, adrenal mets) who has been receiving keytruda (followed by Dr. Coffey) and has received cyberknife to L2 metastatic lesions by Dr. Rizo. Patient presents with increasing dyspnea over past 3-5 days. Initially dyspnea was w/ exertion, now lying flat. Associated dry cough. No upper respiratory symptoms, no fever, no productive cough. In ED ct neck, chest shows advancement of fibronodular lung densities, increased supraclavicular adenopathy and retroperitoneal adenopathy w/ extensive liver mets, and larger bilateral moderate pleural effusions. In ED oxygen saturations would drop intermittently to 85% w/ coughing or movement.    Remainder of detailed HPI is as noted by APC and has been reviewed and/or edited by me for completeness.    Attending Physical Exam:  Constitutional:Alert, oriented x 3, nontoxic appearing, coughs frequently during exam (nonproductive), no increased work of breathing at rest  Psych:Normal/appropriate affect  HEENT:[place]cat, oroph clear  Neck: neck supple, full range of motion  Neuro: Face symmetric, speech clear, equal , moves all extremities  Cardiac: Rrr; No pretibial pitting edema  Resp: decreased air movement bilateral mid & lower lung fields (no wheezing)  GI: abd soft, nontender  Skin: No extremity rash  Musculoskeletal/extremities: no cyanosis extremities; no significant ankle edema            Brief Assessment/Plan :  See detailed assessment and plan developed with APC which I have reviewed and/or edited for completeness.    Electronically signed by Gonzalez Alfaro MD, 03/02/20, 11:00 PM.

## 2020-03-03 NOTE — ED PROVIDER NOTES
Subjective   Patient is a pleasant 54-year-old male with history of lung cancer with metastasis who presents to the ER today with complaints of dyspnea, increased dry cough, and lightheadedness.  States that for the past several weeks he has been short of breath but it is much worse over the past 24 hours.  Had a CT scan which showed small pleural effusions several weeks ago.  Is under the care of Dr. Coffey with oncology and currently is taking Keytruda IV once a month, and Inlyta twice a day.  Does not wear oxygen at home.  Denies any fevers, chills, nausea, vomiting, or diarrhea.  Denies any difficulty with urination      History provided by:  Patient  Shortness of Breath   Severity:  Moderate  Onset quality:  Gradual  Duration:  24 hours  Timing:  Constant  Progression:  Worsening  Chronicity:  Chronic  Context comment:  Shortness of breath at rest, but worse with activity  Relieved by:  Nothing  Worsened by:  Activity  Ineffective treatments:  Inhaler  Associated symptoms: cough    Associated symptoms: no abdominal pain, no chest pain, no fever, no hemoptysis, no sputum production, no syncope, no vomiting and no wheezing    Risk factors: hx of cancer        Review of Systems   Constitutional: Negative for chills and fever.   Respiratory: Positive for cough and shortness of breath. Negative for hemoptysis, sputum production, chest tightness and wheezing.    Cardiovascular: Negative for chest pain, palpitations and syncope.   Gastrointestinal: Negative for abdominal pain, diarrhea, nausea and vomiting.   Genitourinary: Negative for dysuria and frequency.   Neurological: Positive for light-headedness. Negative for syncope.   All other systems reviewed and are negative.      Past Medical History:   Diagnosis Date   • Cancer (CMS/HCC)     Renal, Spine, Brain   • Hypertension        No Known Allergies    Past Surgical History:   Procedure Laterality Date   • CYBERKNIFE Right 01/09/2020    Right paraspinal lesion  behind the L2 vertebral body- Dr. Michael Rizo    • KNEE SURGERY Left     x 3   • ROTATOR CUFF REPAIR Right 2014       Family History   Problem Relation Age of Onset   • Cancer Father    • Heart disease Father        Social History     Socioeconomic History   • Marital status:      Spouse name: Not on file   • Number of children: Not on file   • Years of education: Not on file   • Highest education level: Not on file   Tobacco Use   • Smoking status: Never Smoker   • Smokeless tobacco: Never Used   Substance and Sexual Activity   • Alcohol use: Never     Frequency: Never   • Drug use: Never   • Sexual activity: Defer   Social History Narrative    Lives in [place]. 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            Objective   Physical Exam   Constitutional: He is oriented to person, place, and time. He appears well-developed and well-nourished.   HENT:   Head: Normocephalic.   Neck: Neck supple. No JVD present.   Cardiovascular: Normal rate, regular rhythm and intact distal pulses.   Pulmonary/Chest: Effort normal and breath sounds normal. He has no wheezes. He has no rhonchi. He has no rales.   Abdominal: Soft. Bowel sounds are normal. There is no tenderness.   Musculoskeletal:        Right lower leg: Normal.        Left lower leg: Normal.   Neurological: He is alert and oriented to person, place, and time.   Skin: Skin is warm and dry. Capillary refill takes less than 2 seconds.   Psychiatric: He has a normal mood and affect. His behavior is normal.   Vitals reviewed.      Procedures           ED Course      Recent Results (from the past 24 hour(s))   Comprehensive Metabolic Panel    Collection Time: 03/02/20  4:55 PM   Result Value Ref Range    Glucose 110 (H) 65 - 99 mg/dL    BUN 21 (H) 6 - 20 mg/dL    Creatinine 1.69 (H) 0.76 - 1.27 mg/dL    Sodium 141 136 - 145 mmol/L    Potassium 4.3 3.5 - 5.2 mmol/L    Chloride 102 98 - 107 mmol/L    CO2 28.0 22.0 - 29.0 mmol/L    Calcium  9.0 8.6 - 10.5 mg/dL    Total Protein 6.4 6.0 - 8.5 g/dL    Albumin 3.50 3.50 - 5.20 g/dL    ALT (SGPT) 15 1 - 41 U/L    AST (SGOT) 16 1 - 40 U/L    Alkaline Phosphatase 67 39 - 117 U/L    Total Bilirubin 0.6 0.2 - 1.2 mg/dL    eGFR Non African Amer 42 (L) >60 mL/min/1.73    Globulin 2.9 gm/dL    A/G Ratio 1.2 g/dL    BUN/Creatinine Ratio 12.4 7.0 - 25.0    Anion Gap 11.0 5.0 - 15.0 mmol/L   BNP    Collection Time: 03/02/20  4:55 PM   Result Value Ref Range    proBNP 148.8 5.0 - 900.0 pg/mL   Troponin    Collection Time: 03/02/20  4:55 PM   Result Value Ref Range    Troponin T <0.010 0.000 - 0.030 ng/mL   Light Blue Top    Collection Time: 03/02/20  4:55 PM   Result Value Ref Range    Extra Tube hold for add-on    Green Top (Gel)    Collection Time: 03/02/20  4:55 PM   Result Value Ref Range    Extra Tube Hold for add-ons.    Lavender Top    Collection Time: 03/02/20  4:55 PM   Result Value Ref Range    Extra Tube hold for add-on    CBC Auto Differential    Collection Time: 03/02/20  4:55 PM   Result Value Ref Range    WBC 6.89 3.40 - 10.80 10*3/mm3    RBC 5.09 4.14 - 5.80 10*6/mm3    Hemoglobin 15.1 13.0 - 17.7 g/dL    Hematocrit 43.8 37.5 - 51.0 %    MCV 86.1 79.0 - 97.0 fL    MCH 29.7 26.6 - 33.0 pg    MCHC 34.5 31.5 - 35.7 g/dL    RDW 13.8 12.3 - 15.4 %    RDW-SD 42.7 37.0 - 54.0 fl    MPV 9.5 6.0 - 12.0 fL    Platelets 187 140 - 450 10*3/mm3    Neutrophil % 68.7 42.7 - 76.0 %    Lymphocyte % 20.2 19.6 - 45.3 %    Monocyte % 8.6 5.0 - 12.0 %    Eosinophil % 1.5 0.3 - 6.2 %    Basophil % 0.6 0.0 - 1.5 %    Immature Grans % 0.4 0.0 - 0.5 %    Neutrophils, Absolute 4.74 1.70 - 7.00 10*3/mm3    Lymphocytes, Absolute 1.39 0.70 - 3.10 10*3/mm3    Monocytes, Absolute 0.59 0.10 - 0.90 10*3/mm3    Eosinophils, Absolute 0.10 0.00 - 0.40 10*3/mm3    Basophils, Absolute 0.04 0.00 - 0.20 10*3/mm3    Immature Grans, Absolute 0.03 0.00 - 0.05 10*3/mm3    nRBC 0.0 0.0 - 0.2 /100 WBC   TSH    Collection Time: 03/02/20  4:55 PM    Result Value Ref Range    TSH 4.610 (H) 0.270 - 4.200 uIU/mL   Gold Top - SST    Collection Time: 03/02/20  4:56 PM   Result Value Ref Range    Extra Tube Hold for add-ons.    Troponin    Collection Time: 03/02/20  7:08 PM   Result Value Ref Range    Troponin T <0.010 0.000 - 0.030 ng/mL     Note: In addition to lab results from this visit, the labs listed above may include labs taken at another facility or during a different encounter within the last 24 hours. Please correlate lab times with ED admission and discharge times for further clarification of the services performed during this visit.    CT Soft Tissue Neck Without Contrast   Preliminary Result   Moderate size bilateral pleural effusions with adenopathy   throughout the supraclavicular region bilaterally. Tiny fibronodular   densities seen diffusely throughout the lung fields with some air   bronchogram centrally suggesting either lymphangitic spread of disease   or diffuse infectious process. Clinical correlation is needed. The upper   abdomen reveals extensive disease with involvement of the right kidney,   bilateral adrenal glands, adenopathy in the retroperitoneum as well as   extensive disease throughout the liver. Tiny stone identified within the   gallbladder.              CT Chest Without Contrast   Preliminary Result   Moderate size bilateral pleural effusions with adenopathy   throughout the supraclavicular region bilaterally. Tiny fibronodular   densities seen diffusely throughout the lung fields with some air   bronchogram centrally suggesting either lymphangitic spread of disease   or diffuse infectious process. Clinical correlation is needed. The upper   abdomen reveals extensive disease with involvement of the right kidney,   bilateral adrenal glands, adenopathy in the retroperitoneum as well as   extensive disease throughout the liver. Tiny stone identified within the   gallbladder.              XR Chest 1 View   Preliminary Result  "  Worsening of the airspace disease seen within the lung   fields bilaterally. Findings suggesting either diffuse edema or   pneumonia. Small bilateral pleural effusions. The heart is borderline   enlarged. Clinical correlation is needed.              CT Guided Thoracentesis    (Results Pending)     Vitals:    03/02/20 1643 03/02/20 1741 03/02/20 1800 03/02/20 2030   BP: 132/91  115/59 136/85   BP Location: Left arm      Patient Position: Sitting      Pulse: 70 64 84 90   Resp: 12 16     Temp: 98.7 °F (37.1 °C)      TempSrc: Oral      SpO2: 95% 93% 90% 93%   Weight: 88.5 kg (195 lb)      Height: 167.6 cm (66\")        Medications   sodium chloride 0.9 % flush 10 mL (has no administration in time range)   Hold medication (has no administration in time range)   albuterol (PROVENTIL) nebulizer solution 0.083% 2.5 mg/3mL (2.5 mg Nebulization Given 3/2/20 1741)   furosemide (LASIX) injection 20 mg (20 mg Intravenous Given 3/2/20 2021)     ECG/EMG Results (last 24 hours)     Procedure Component Value Units Date/Time    ECG 12 Lead [[id number]] Collected:  03/02/20 1657     Updated:  03/02/20 1739    ECG 12 Lead [[id number]] Collected:  03/02/20 1900     Updated:  03/02/20 1900        ECG 12 Lead         ECG 12 Lead           After nebulizer treatment, patient states that his breathing is now better.  Placed on oxygen.      Discussed patient case with Dr. Coffey with oncology who recommends admission and possible thoracentesis for pleural effusions.  Feels that along with the CT findings and patient's symptoms that his symptoms are more related to progression of his lung cancer.    Spoke with Dr. Alfaro with hospital medicine who will admit the patient to [facility] [place].                                     [facility]    Final diagnoses:   Hypoxia   Bilateral pleural effusion   Dyspnea, unspecified type   Primary malignant neoplasm of lung metastatic to other site, unspecified laterality (CMS/HCC)            Maryam May APRN  03/02/20 " [id number]

## 2020-03-03 NOTE — PROGRESS NOTES
[facility] Medicine Services  PROGRESS NOTE    Patient Name: Alphonso Evans  : 1965  MRN: [id number]    Date of Admission: 3/2/2020  Primary Care Physician: Juan Mccarty MD    Subjective   Subjective     CC:  Follow up SOB    HPI:  Progressive SOB with dry cough for better part of a month. Awaiting thoracentesis today. Follows with Dr. Coffey who is anticipated to see him tomorrow. Denies fever, chills, sputum production, edema.    Review of Systems  Gen- No fevers, chills  CV- No chest pain, palpitations  Resp- Yes cough, dyspnea  GI- No N/V/D, abd pain    Objective   Objective     Vital Signs:   Temp:  [97.7 °F (36.5 °C)-99.7 °F (37.6 °C)] 98.9 °F (37.2 °C)  Heart Rate:  [64-90] 74  Resp:  [12-20] 20  BP: (114-136)/(59-94) 120/84        Physical Exam:  Constitutional: No acute distress, awake, alert, middle aged male sitting up in bed  HENT: NCAT, mucous membranes moist  Respiratory: Diminished breath sounds BL bases with tachypnea but no distress   Cardiovascular: RRR, no murmurs, rubs, or gallops, palpable radial pulses bilaterally  Gastrointestinal: Positive bowel sounds, soft, nontender, nondistended  Musculoskeletal: No bilateral ankle edema  Psychiatric: Appropriate affect, cooperative  Neurologic: Speech clear  Skin: No rashes    Results Reviewed:  Results from last 7 days   Lab Units 20  0446 20  1655   WBC 10*3/mm3 5.79 6.89   HEMOGLOBIN g/dL 14.9 15.1   HEMATOCRIT % 44.0 43.8   PLATELETS 10*3/mm3 184 187   INR   --  1.02     Results from last 7 days   Lab Units 20  0446 20  1908 20  1655   SODIUM mmol/L 138  --  141   POTASSIUM mmol/L 3.9  --  4.3   CHLORIDE mmol/L 100  --  102   CO2 mmol/L 25.0  --  28.0   BUN mg/dL 21*  --  21*   CREATININE mg/dL 1.69*  --  1.69*   GLUCOSE mg/dL 92  --  110*   CALCIUM mg/dL 8.7  --  9.0   ALT (SGPT) U/L  --   --  15   AST (SGOT) U/L  --   --  16   TROPONIN T ng/mL  --  <0.010 <0.010   PROBNP  pg/mL  --   --  148.8     Estimated Creatinine Clearance: 51.1 mL/min (A) (by C-G formula based on SCr of 1.69 mg/dL (H)).    Microbiology Results Abnormal     None          Imaging Results (Last 24 Hours)     Procedure Component Value Units Date/Time    CT Guided Thoracentesis [[id number]] Resulted:  03/03/20 1443     Updated:  03/03/20 1518    CT Soft Tissue Neck Without Contrast [[id number]] Collected:  03/02/20 1842     Updated:  03/03/20 1203    Narrative:       EXAMINATION: CT SOFT TISSUE NECK WO CONTRAST-, CT CHEST WO CONTRAST-      INDICATION: Followup lung cancer, difficulty swallowing.     TECHNIQUE: Multiple axial CT imaging was obtained of the neck and chest  without the administration of intravenous contrast.     The radiation dose reduction device was turned on for each scan per the  ALARA (As Low as Reasonably Achievable) protocol.     COMPARISON: None.     FINDINGS: Thyroid is homogeneous in appearance. There is no significant  adenopathy identified within the soft tissues of the neck. There are old  lymph nodes identified throughout the supraclavicular regions  bilaterally which are mildly enlarged. There are findings concerning for  reactive adenopathy, however, metastatic disease cannot be excluded. The  largest lymph node seen in the supraclavicular region on the left  posteriorly is 1.5 cm. There is a lymph node identified at the base of  the right neck measuring 1.2 cm. No other adenopathy is identified. The  visualized paranasal sinuses are clear. The mastoid air cells are  patent. The chest reveals adenopathy throughout the mediastinum for  example there is a paratracheal lymph node on the right measuring 2.4  cm. Bilateral pleural effusions moderate in size with fibronodular  densities seen diffusely throughout the lung fields bilaterally.  Findings may suggest a diffuse airspace disease such as infiltrate  pneumonia, however, lymphangitic spread of disease cannot be completely  excluded and  clinical correlation is needed. The cardiac chambers are  within normal limits. No pericardial effusion. Visualized upper abdomen  reveals abnormality identified within the right kidney. There is  abnormality identified within the adrenal gland on the right and left.  Multiple liver lesions are identified. There are stones seen in the  gallbladder. Adenopathy throughout the retroperitoneum. Splenule or soft  tissue implant identified of the left upper quadrant. The bony  structures reveal degenerative changes seen within the spine.       Impression:       Moderate size bilateral pleural effusions with adenopathy  throughout the supraclavicular region bilaterally. Tiny fibronodular  densities seen diffusely throughout the lung fields with some air  bronchogram centrally suggesting either lymphangitic spread of disease  or diffuse infectious process. Clinical correlation is needed. The upper  abdomen reveals extensive disease with involvement of the right kidney,  bilateral adrenal glands, adenopathy in the retroperitoneum as well as  extensive disease throughout the liver. Tiny stone identified within the  gallbladder.     D:  03/02/2020  E:  03/03/2020       CT Chest Without Contrast [[id number]] Collected:  03/02/20 1842     Updated:  03/03/20 1203    Narrative:       EXAMINATION: CT SOFT TISSUE NECK WO CONTRAST-, CT CHEST WO CONTRAST-      INDICATION: Followup lung cancer, difficulty swallowing.     TECHNIQUE: Multiple axial CT imaging was obtained of the neck and chest  without the administration of intravenous contrast.     The radiation dose reduction device was turned on for each scan per the  ALARA (As Low as Reasonably Achievable) protocol.     COMPARISON: None.     FINDINGS: Thyroid is homogeneous in appearance. There is no significant  adenopathy identified within the soft tissues of the neck. There are old  lymph nodes identified throughout the supraclavicular regions  bilaterally which are mildly  enlarged. There are findings concerning for  reactive adenopathy, however, metastatic disease cannot be excluded. The  largest lymph node seen in the supraclavicular region on the left  posteriorly is 1.5 cm. There is a lymph node identified at the base of  the right neck measuring 1.2 cm. No other adenopathy is identified. The  visualized paranasal sinuses are clear. The mastoid air cells are  patent. The chest reveals adenopathy throughout the mediastinum for  example there is a paratracheal lymph node on the right measuring 2.4  cm. Bilateral pleural effusions moderate in size with fibronodular  densities seen diffusely throughout the lung fields bilaterally.  Findings may suggest a diffuse airspace disease such as infiltrate  pneumonia, however, lymphangitic spread of disease cannot be completely  excluded and clinical correlation is needed. The cardiac chambers are  within normal limits. No pericardial effusion. Visualized upper abdomen  reveals abnormality identified within the right kidney. There is  abnormality identified within the adrenal gland on the right and left.  Multiple liver lesions are identified. There are stones seen in the  gallbladder. Adenopathy throughout the retroperitoneum. Splenule or soft  tissue implant identified of the left upper quadrant. The bony  structures reveal degenerative changes seen within the spine.       Impression:       Moderate size bilateral pleural effusions with adenopathy  throughout the supraclavicular region bilaterally. Tiny fibronodular  densities seen diffusely throughout the lung fields with some air  bronchogram centrally suggesting either lymphangitic spread of disease  or diffuse infectious process. Clinical correlation is needed. The upper  abdomen reveals extensive disease with involvement of the right kidney,  bilateral adrenal glands, adenopathy in the retroperitoneum as well as  extensive disease throughout the liver. Tiny stone identified within  the  gallbladder.     D:  03/02/2020  E:  03/03/2020       XR Chest 1 View [[id number]] Collected:  03/02/20 1713     Updated:  03/03/20 1106    Narrative:       EXAMINATION: XR CHEST 1 VW-03/02/2020:      INDICATION: SOA, triage protocol.      COMPARISON: NONE.     FINDINGS: Portable chest reveals the cardiac silhouette to be enlarged.  Increased pulmonary vascularity bilaterally. Small bilateral pleural  effusions with minimal increased markings at the lung bases.  Degenerative changes seen within the spine.           Impression:       Worsening of the airspace disease seen within the lung  fields bilaterally. Findings suggesting either diffuse edema or  pneumonia. Small bilateral pleural effusions. The heart is borderline  enlarged. Clinical correlation is needed.     D:  03/02/2020  E:  03/03/2020                   Results for orders placed during the hospital encounter of 01/29/20   Adult Transthoracic Echo Complete W/ Cont if Necessary Per Protocol    Narrative · Estimated EF = 50-55%.  · Left ventricular systolic function is low normal.  · Mild tricuspid valve regurgitation is present.  · Calculated right ventricular systolic pressure from tricuspid   regurgitation is 27 mmHg.  · The global longitudinal strain is calculated at -17 which is slightly   less than normal (normal is -20)          I have reviewed the medications:  Scheduled Meds:  axitinib 5 mg Oral Q12H   doxycycline 100 mg Oral Q12H   metoprolol succinate  mg Oral Daily   pantoprazole 40 mg Oral Daily   sodium chloride 10 mL Intravenous Q12H   terazosin 5 mg Oral Q12H     Continuous Infusions:  hold 1 each     PRN Meds:.•  acetaminophen **OR** acetaminophen **OR** acetaminophen  •  benzonatate  •  guaiFENesin-dextromethorphan  •  hold  •  HYDROcod Polst-CPM Polst ER  •  ipratropium-albuterol  •  magnesium sulfate **OR** magnesium sulfate in D5W 1g/100mL (PREMIX) **OR** magnesium sulfate  •  melatonin  •  promethazine  •  sodium chloride  •   sodium chloride  •  traMADol    Assessment/Plan   Assessment & Plan     Active Hospital Problems    Diagnosis  POA   • **Hypoxia [R09.02]  Yes   • Metastatic cancer (CMS/HCC) [C79.9]  Yes   • Elevated serum creatinine [R79.89]  Unknown   • Renal cancer, left (CMS/HCC) [C64.2]  Yes   • Hypertension [I10]  Yes      Resolved Hospital Problems   No resolved problems to display.        Brief Hospital Course to date:  Alphonso Evans is a 54 y.o. 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 Pruitt and Dr. Coffey who presented with progressive 1 mo shortness of breath found to have BL pleural effusions and CT concerning for possible lymphangitic spread    The following problems are new to me today    Assessment/Plan    Acute hypoxic respiratory insuffiencey  BL pleural effusions  - based on subacute course and CT findings, I am concerned for lymphangitic spread and that these are malignant effusions  - CT guided thoracentesis (left) scheduled for today; studies to be sent  - will cont empiric doxycycline to cover atypical PNA/bronchitis  - per H&P Dr. Coffey is to see tomorrow (discussions about long term management of effusions as I anticipate them to be recurrent)  - reassess and possible RT side thoracentesis tomorrow  - with active malignancy, he is high risk for VTE disease    Diffusely metastatic renal Ca  - reviewed outpatient records, felt to be atypical non-clear cell RCC or a collecting duct carcinoma; final path sent to outside facility  - spine, liver, ?brain mets with known LAD in the chest  - follows with Dr. Pruitt and Dr. Coffey, per H&P Dr. Coffey to see tomorrow  - cont Keytruda and Inlyta while awaiting onc input  - appears to have progressed despite ~5 weeks of therapy, appreciate Dr. Ceballos's input    HTN  - cont metoprolol, hytrin    Increased serum creatinine  - likely 2/2 increased muscle mass, though potentially related to Renal  cancer    DVT Prophylaxis:  Mechanical pending possible repeat thoracentesis    Disposition: I expect the patient to be discharged in 1-2 days pending respiratory status and heme/onc input tomorrow.    CODE STATUS:   Code Status and Medical Interventions:   Ordered at: 03/02/20 2052     Code Status:    CPR     Medical Interventions (Level of Support Prior to Arrest):    Full         Electronically signed by Graeme Rhodes DO, 03/03/20, 3:50 PM.

## 2020-03-03 NOTE — PROGRESS NOTES
Discharge Planning Assessment  [facility]     Patient Name: Alphonso Evans  MRN: [id number]  Today's Date: 3/3/2020    Admit Date: 3/2/2020    Discharge Needs Assessment     Row Name 03/03/20 0935       Living Environment    Lives With  sibling(s)    Current Living Arrangements  home/apartment/condo    Primary Care Provided by  self    Provides Primary Care For  no one    Family Caregiver if Needed  sibling(s)    Quality of Family Relationships  involved;helpful    Able to Return to Prior Arrangements  yes       Transition Planning    Patient/Family Anticipates Transition to  home with family    Patient/Family Anticipated Services at Transition  none    Transportation Anticipated  family or friend will provide       Discharge Needs Assessment    Readmission Within the Last 30 Days  no previous admission in last 30 days    Concerns to be Addressed  discharge planning;adjustment to diagnosis/illness    Equipment Currently Used at Home  none    Anticipated Changes Related to Illness  none    Equipment Needed After Discharge  none        Discharge Plan     Row Name 03/03/20 0935       Plan    Plan  home with family    Patient/Family in Agreement with Plan  yes    Plan Comments  Pt lives with his sister in [facility]. He reports he is independent with all ADLs prior to admit and denies current use of any DME/HH. Pt is followed by his PCP and oncology and reports his medications are covered by [employer]. At this time his plan for discharge is to return home. CM to follow for any discharge needs.    Final Discharge Disposition Code  01 - home or self-care        Destination      Coordination has not been started for this encounter.      Durable Medical Equipment      Coordination has not been started for this encounter.      Dialysis/Infusion      Coordination has not been started for this encounter.      Home Medical Care      Coordination has not been started for this encounter.      Therapy      Coordination  has not been started for this encounter.      Community Resources      Coordination has not been started for this encounter.          Demographic Summary     Row Name 03/03/20 0934       General Information    Admission Type  inpatient    Referral Source  physician    Reason for Consult  discharge planning    General Information Comments  PCP Juan Mccarty       Contact Information    Permission Granted to Share Info With  ;family/designee    Contact Information Comments  Felecia Sandoval [phone]        Functional Status     Row Name 03/03/20 0934       Functional Status, IADL    Medications  independent    Meal Preparation  independent    Housekeeping  independent    Laundry  independent    Shopping  independent       Mental Status    General Appearance WDL  WDL       Mental Status Summary    Recent Changes in Mental Status/Cognitive Functioning  no changes        Psychosocial    No documentation.       Abuse/Neglect    No documentation.       Legal    No documentation.       Substance Abuse    No documentation.       Patient Forms    No documentation.           Leah Ashton RN

## 2020-03-03 NOTE — CONSULTS
HEMATOLOGY/ONCOLOGY INPATIENT CONSULT    REASON FOR CONSULT: metastatic kidney cancer    Subjective   HISTORY OF PRESENT ILLNESS; I am asked to see this 54 y.o.  male, referred for metastatic kidney cancer with possible lymphangitic spread.  Patient is currently receiving pembrolizumab and axitinib.  He is due for cycle 4 on 3/16/2020.  He presented to the ED yesterday with worsening shortness of breath.  CT chest, soft tissue neck w/o contrast showed moderate bilateral pleural effusions wtih adenopathy throughout the supraclavicular region bilaterally; concern for lymphangitic spread of disease versus diffuse infectious process of lung fields; involvement of the right kidney, bilateral adrenal glands, adenopathy in the retroperitoneum and extensive disease throughout the liver; tiny stone in the gallbladder.      Patient states breathing has not improved since admission.  Shortness of breath is worse with any movement or exertion.  Plan is for CT guided left side thoracentesis today.  If he tolerates then he will need right side thoracentesis tomorrow.  He continues to have non-productive cough.  Otherwise he has no new issues or concerns.        Past Medical History:   Diagnosis Date   • Cancer (CMS/HCC)     Renal, Spine, Brain   • Hypertension      Past Surgical History:   Procedure Laterality Date   • CYBERKNIFE Right 01/09/2020    Right paraspinal lesion behind the L2 vertebral body- Dr. Michael Rizo    • KNEE SURGERY Left     x 3   • ROTATOR CUFF REPAIR Right 2014       No current facility-administered medications on file prior to encounter.      Current Outpatient Medications on File Prior to Encounter   Medication Sig Dispense Refill   • albuterol sulfate  (90 Base) MCG/ACT inhaler Inhale 2 puffs Every 4 (Four) Hours As Needed for Wheezing or Shortness of Air. 1 inhaler 0   • axitinib (INLYTA) 5 MG chemo tablet Take 1 tablet by mouth Every 12 (Twelve) Hours. 60 tablet 5   • hydroCHLOROthiazide  "(HYDRODIURIL) 25 MG tablet Take 1 tablet by mouth Daily. 30 tablet 0   • losartan (COZAAR) 50 MG tablet Take 100 mg by mouth Daily.     • metoprolol succinate XL (TOPROL-XL) 50 MG 24 hr tablet 100 mg Daily.     • benzonatate (TESSALON PERLES) 100 MG capsule Take 2 capsules by mouth 3 (Three) Times a Day As Needed for Cough. 90 capsule 0   • doxazosin (CARDURA) 8 MG tablet 2 tabs po daily (Patient taking differently: 16 mg Every Night.) 180 tablet 3   • ondansetron (ZOFRAN) 8 MG tablet Take 1 tablet by mouth Every 8 (Eight) Hours As Needed for Nausea or Vomiting. 30 tablet 3   • pantoprazole (PROTONIX) 40 MG EC tablet Take 1 tablet by mouth Daily. 30 tablet 0   • Pembrolizumab (KEYTRUDA IV) Infuse  into a venous catheter Every 21 (Twenty-One) Days.     • promethazine (PHENERGAN) 12.5 MG tablet Take 1 tablet by mouth Every 6 (Six) Hours As Needed for Nausea or Vomiting for up to 60 doses. 60 tablet 0   • traMADol (ULTRAM) 50 MG tablet Take 50 mg by mouth Every 6 (Six) Hours As Needed for Moderate Pain .         No Known Allergies    Social History     Socioeconomic History   • Marital status:      Spouse name: Not on file   • Number of children: Not on file   • Years of education: Not on file   • Highest education level: Not on file   Tobacco Use   • Smoking status: Never Smoker   • Smokeless tobacco: Never Used   Substance and Sexual Activity   • Alcohol use: Never     Frequency: Never   • Drug use: Never   • Sexual activity: Defer   Social History Narrative    Lives in [place]. 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        Family History   Problem Relation Age of Onset   • Cancer Father    • Heart disease Father          REVIEW OF SYSTEMS:  A 14 point review of systems was performed and is negative except as noted above.    Objective   PHYSICAL EXAM:    /84   Pulse 74   Temp 98.9 °F (37.2 °C) (Oral)   Resp 20   Ht 167.6 cm (66\")   Wt 87.1 kg (192 lb)   SpO2 91%   BMI " 30.99 kg/m²     ECOG: (1) Restricted in Physically Strenuous Activity, Ambulatory & Able to Do Work of Light Nature  General: well appearing male in no acute distress  HEENT: sclerae anicteric, oropharynx clear  Lymphatics: no cervical, supraclavicular, inguinal, or axillary adenopathy  Neck: Supple. No thyromegaly.  Cardiovascular: regular rate and rhythm, no murmurs  Lungs: Diminished bases bilaterally, no respiratory distress at rest with oxygen in place  Abdomen: soft, nontender, nondistended.  No palpable organomegaly  Extremities: no lower extremity edema, cyanosis, or clubbing  Skin: no rashes, lesions, bruising, or petechiae  Neuro: Alert and oriented x3. Moves all extremities.    Results:    Results from last 7 days   Lab Units 03/03/20  0446 03/02/20  1655   WBC 10*3/mm3 5.79 6.89   HEMOGLOBIN g/dL 14.9 15.1   PLATELETS 10*3/mm3 184 187     Results from last 7 days   Lab Units 03/03/20  0446 03/02/20  1655   SODIUM mmol/L 138 141   POTASSIUM mmol/L 3.9 4.3   CO2 mmol/L 25.0 28.0   BUN mg/dL 21* 21*   CREATININE mg/dL 1.69* 1.69*   GLUCOSE mg/dL 92 110*     Results from last 7 days   Lab Units 03/02/20  1655   AST (SGOT) U/L 16   ALT (SGPT) U/L 15   BILIRUBIN mg/dL 0.6   ALK PHOS U/L 67         Ct Soft Tissue Neck Without Contrast    Result Date: 3/3/2020  Moderate size bilateral pleural effusions with adenopathy throughout the supraclavicular region bilaterally. Tiny fibronodular densities seen diffusely throughout the lung fields with some air bronchogram centrally suggesting either lymphangitic spread of disease or diffuse infectious process. Clinical correlation is needed. The upper abdomen reveals extensive disease with involvement of the right kidney, bilateral adrenal glands, adenopathy in the retroperitoneum as well as extensive disease throughout the liver. Tiny stone identified within the gallbladder.  D:  03/02/2020 E:  03/03/2020      Ct Chest Without Contrast    Result Date: 3/3/2020  Moderate  size bilateral pleural effusions with adenopathy throughout the supraclavicular region bilaterally. Tiny fibronodular densities seen diffusely throughout the lung fields with some air bronchogram centrally suggesting either lymphangitic spread of disease or diffuse infectious process. Clinical correlation is needed. The upper abdomen reveals extensive disease with involvement of the right kidney, bilateral adrenal glands, adenopathy in the retroperitoneum as well as extensive disease throughout the liver. Tiny stone identified within the gallbladder.  D:  03/02/2020 E:  03/03/2020      Xr Chest 1 View    Result Date: 3/3/2020  Worsening of the airspace disease seen within the lung fields bilaterally. Findings suggesting either diffuse edema or pneumonia. Small bilateral pleural effusions. The heart is borderline enlarged. Clinical correlation is needed.  D:  03/02/2020 E:  03/03/2020          Assessment    ASSESSMENT & PLAN:  1.  Kidney cancer with widespread metastases possible lymphangitic spread  2.  Bilateral pleural effusions  3.  Hypertension  4.  Increased serum creatinine    Discussion:  Plan for left side thoracentesis today and if he tolerates then plan for possible right side tomorrow.  Will proceed with further work-up to include bone scan, MRI of brain, and CT of abdomen pelvis.  Will have to do without contrast due to his elevated creatinine level.  It appears patient has progressive disease despite treatment with Keytruda and Inlyta.  Dr. Coffey will follow up with patient tomorrow to review scan results and discuss further treatment options and plan.    Trinh Alvarenga, APRN    3/3/2020

## 2020-03-03 NOTE — PLAN OF CARE
Problem: Patient Care Overview  Goal: Individualization and Mutuality  Outcome: Ongoing (interventions implemented as appropriate)     Problem: Patient Care Overview  Goal: Individualization and Mutuality  Outcome: Ongoing (interventions implemented as appropriate)  Goal: Interprofessional Rounds/Family Conf  Outcome: Ongoing (interventions implemented as appropriate)     Problem: Pain, Acute (Adult)  Goal: Identify Related Risk Factors and Signs and Symptoms  Outcome: Ongoing (interventions implemented as appropriate)  Flowsheets (Taken 3/3/2020 0001)  Related Risk Factors (Acute Pain): disease process  Goal: Acceptable Pain Control/Comfort Level  Outcome: Ongoing (interventions implemented as appropriate)     Problem: Activity Intolerance (Adult)  Goal: Identify Related Risk Factors and Signs and Symptoms  Outcome: Ongoing (interventions implemented as appropriate)  Flowsheets (Taken 3/3/2020 0001)  Related Risk Factors (Activity Intolerance): impaired pulmonary function; O2 supply/demand imbalance  Signs and Symptoms (Activity Intolerance): dyspnea/shortness of breath  Goal: Activity Tolerance  Outcome: Ongoing (interventions implemented as appropriate)  Goal: Effective Energy Conservation Techniques  Outcome: Ongoing (interventions implemented as appropriate)     Problem: Patient Care Overview  Goal: Plan of Care Review  3/3/2020 0004 by Alphonso Carlos, RN  Note:   VSS, O2 sats 95-96% on 2.5 L NC , pt comfortable currently, paracentesis scheduled for 3/3/20, will continue to monitor  3/3/2020 0001 by Alphonso Carlos, RN  Flowsheets (Taken 3/2/2020 2057)  Plan of Care Reviewed With: patient  Note:   VSS, paracentesis scheduled for 3/3/20, pt comfortable currently, will continue to monitor

## 2020-03-04 NOTE — PLAN OF CARE
Problem: Patient Care Overview  Goal: Plan of Care Review  Outcome: Ongoing (interventions implemented as appropriate)  Flowsheets  Taken 3/4/2020 0304  Progress: improving  Outcome Summary: VSS.  Thoracentesis Left done yesterday removed 600ml fluid.  O2.  Cough persists but is less SOA.  Possibly to have right thoracentesis today.  No orders to that effect.  Had MRI of brain and CT of abdomen done.  Taken 3/3/2020 2000  Plan of Care Reviewed With: patient;family

## 2020-03-04 NOTE — NURSING NOTE
Patient placed on cardiac monitors, vitals stable. Images taken and reviewed by Dr. Millan, A right thoracentesis completed with a small 10 ml serous sample obtained and sent to lab. Patient tolerated well. Report to 5B.

## 2020-03-04 NOTE — PROGRESS NOTES
Continued Stay Note  [facility]     Patient Name: Alphonso Evans  MRN: [id number]  Today's Date: 3/4/2020    Admit Date: 3/2/2020    Discharge Plan     Row Name 03/04/20 1122       Plan    Plan  Plan is home    Patient/Family in Agreement with Plan  yes    Plan Comments  Met with patient in the room.  Had L thoracentesis on 3-3 with 600 ml noted.  Having a bone scan and porrible R thoracentesis today.  Plan is home at discharge.  Following for D/C needs    Final Discharge Disposition Code  01 - home or self-care        Discharge Codes    No documentation.             Shweta Alfonso RN

## 2020-03-04 NOTE — PROGRESS NOTES
HEMATOLOGY/ONCOLOGY PROGRESS NOTE    Subjective      CC: Progressive renal cell carcinoma    SUBJECTIVE: Dyspnea better post thoracentesis but still with dry cough without fevers.        Past Medical History, Past Surgical History, Social History, Family History have been reviewed and are without significant changes except as mentioned.      Medications:  The current medication list was reviewed in the EMR    ALLERGIES: No Known Allergies    ROS:  A comprehensive 14 point review of systems was performed and was negative except as mentioned.      Objective      Vitals:    03/04/20 1126 03/04/20 1135 03/04/20 1141 03/04/20 1150   BP: 104/81 105/77 114/76 139/83   Pulse: 82 85 76 84   Resp: 20 20 18 18   Temp:    98.1 °F (36.7 °C)   TempSrc:    Oral   SpO2: 96% 97% 95% 95%   Weight:       Height:            1. ECOG: (2) Ambulatory & Capable of Self Care, Unable to Carry Out Work Activity, Up & About Greater Than 50% of Waking Hours    General: well appearing, in no acute distress  HEENT: sclerae anicteric, oropharynx clear  Lymphatics: no cervical, supraclavicular, inguinal, or axillary adenopathy  Cardiovascular: regular rate and rhythm, no murmurs  Lungs: clear to auscultation bilaterally  Abdomen: soft, nontender, nondistended.  No palpable organomegaly  Extremities: no lower extremity edema  Skin: no rashes, lesions, bruising, or petechiae  Neuro: Alert and oriented x 3. Moves all extremities.    RECENT LABS:    Results from last 7 days   Lab Units 03/03/20  0446 03/02/20  1655   WBC 10*3/mm3 5.79 6.89   HEMOGLOBIN g/dL 14.9 15.1   PLATELETS 10*3/mm3 184 187     Results from last 7 days   Lab Units 03/03/20  0446 03/02/20  1655   SODIUM mmol/L 138 141   POTASSIUM mmol/L 3.9 4.3   CO2 mmol/L 25.0 28.0   BUN mg/dL 21* 21*   CREATININE mg/dL 1.69* 1.69*   GLUCOSE mg/dL 92 110*     Results from last 7 days   Lab Units 03/02/20  1655   AST (SGOT) U/L 16   ALT (SGPT) U/L 15   BILIRUBIN mg/dL 0.6   ALK PHOS U/L 67          Ct Abdomen Pelvis Without Contrast    Result Date: 3/4/2020  1. Mild interval progression of liver metastatic disease, and adrenal disease compared to 12/18/2019 abdomen and pelvis CT scan. 2. Mild progression of lytic change of the right pedicle of L2. 3. No significant overall change in appearance of patient's left upper pole renal mass, perinephric fat stranding, and retroperitoneal adenopathy. 4. Small new pleural effusions, and dense interstitial disease lower lungs, which resembles interstitial edema.  D:  03/04/2020 E:  03/04/2020      Ct Soft Tissue Neck Without Contrast    Result Date: 3/3/2020  Moderate size bilateral pleural effusions with adenopathy throughout the supraclavicular region bilaterally. Tiny fibronodular densities seen diffusely throughout the lung fields with some air bronchogram centrally suggesting either lymphangitic spread of disease or diffuse infectious process. Clinical correlation is needed. The upper abdomen reveals extensive disease with involvement of the right kidney, bilateral adrenal glands, adenopathy in the retroperitoneum as well as extensive disease throughout the liver. Tiny stone identified within the gallbladder.  D:  03/02/2020 E:  03/03/2020      Ct Chest Without Contrast    Result Date: 3/3/2020  Moderate size bilateral pleural effusions with adenopathy throughout the supraclavicular region bilaterally. Tiny fibronodular densities seen diffusely throughout the lung fields with some air bronchogram centrally suggesting either lymphangitic spread of disease or diffuse infectious process. Clinical correlation is needed. The upper abdomen reveals extensive disease with involvement of the right kidney, bilateral adrenal glands, adenopathy in the retroperitoneum as well as extensive disease throughout the liver. Tiny stone identified within the gallbladder.  D:  03/02/2020 E:  03/03/2020      Mri Brain Without Contrast    Result Date: 3/3/2020  Mild senescent changes of  mild chronic small vessel ischemic disease involvement without acute infarction midline shift or obvious mass occupying focus however limited due to lack of intravenous contrast which should be recommended for definitive follow-up of potential metastatic involvement in a patient with known malignancy.    This report was finalized on 3/3/2020 6:52 PM by Dr. Fabián Francois.      Xr Chest 1 View    Result Date: 3/3/2020  Worsening of the airspace disease seen within the lung fields bilaterally. Findings suggesting either diffuse edema or pneumonia. Small bilateral pleural effusions. The heart is borderline enlarged. Clinical correlation is needed.  D:  03/02/2020 E:  03/03/2020                  Assessment   ASSESSMENT & PLAN:  1. Metastatic renal cell carcinoma  2. Worsening liver metastases  3. No change in renal mass  4. Worsening pulmonary reticulonodular infiltrate with dyspnea and effusions  5. No clear-cut progression in the brain though was noncontrast due to renal dysfunction  6. Renal dysfunction    Discussion: Feels much better after his thoracenteses.  If the fluid reaccumulate, rather than repeating thoracentesis I would recommend Pleurx drainage placement.  Clearly the Keytruda and axitinib is not holding this and it is possible that what we are seeing in the lung is lymphangitic spread.  It could also be Keytruda-induced pulmonary interstitial immune mediated injury.  In either case we are taking him off all therapy at the moment, placing him on high-dose steroids while he is inpatient with Solu-Medrol 125 mg every 12 hours and would ask the hospitalists to send him home on a Medrol Dosepak and he will see me back in my office in the days ahead to decide if we would put him through Cabozantanib or chemotherapy or to see if there are any molecular targets to hit.  It is possible that what is in the lungs could also be infectious but he has had no fevers or productive cough and that goes more towards either  lymphangitic spread or autoimmune attack.  Discussed with patient face-to-face 45 minutes greater than 50% spent counseling.                    Jose Coffey MD    3/4/2020

## 2020-03-04 NOTE — PROGRESS NOTES
Patient Name:  Alphonso Evans  YOB: 1965  Patient Age:  54 y.o.  Patient's Sex:  male    Date of Service:  03/04/2020    Provider:  Dr. Jose Coffey                                        RESEARCH INFORMED CONSENT                                     At the request of Dr. Coffey I met with Mr. Evans today to discuss the STR[employer] screening trial. Dr. Coffey previously introduced the trial. I reviewed all 8 elements of the ICF with the patient and his sister via telephone. The subject has read and verbalized an understanding of the informed consent.  All questions and concerns were addressed.  He signed and was given a copy of the written informed consent.    Selections made re: study related communication. I provided the [employer] “Fact and Information” brochure as well as my contact information. The patient knows to contact me further questions/concerns regarding the [employer] trial. We will submit tissue per Dr Coffey request.            Eda Hutchison    03/04/20, 3:53 PM

## 2020-03-04 NOTE — DISCHARGE SUMMARY
[facility] Medicine Services  DISCHARGE SUMMARY    Patient Name: Alphonso Evans  : 1965  MRN: [id number]    Date of Admission: 3/2/2020  4:52 PM  Date of Discharge:  3/4/2020  Primary Care Physician: Juan Mccarty MD    Consults     Date and Time Order Name Status Description    3/3/2020 0030 Inpatient Hematology & Oncology Consult Completed           Hospital Course     Presenting Problem:   Hypoxia [R09.02]  Hypoxia [R09.02]  Hypoxia [R09.02]    Active Hospital Problems    Diagnosis  POA   • **Hypoxia [R09.02]  Yes   • Metastatic cancer (CMS/HCC) [C79.9]  Yes   • Elevated serum creatinine [R79.89]  Unknown   • Renal cancer, left (CMS/HCC) [C64.2]  Yes   • Hypertension [I10]  Yes      Resolved Hospital Problems   No resolved problems to display.          Hospital Course:  Alphonso Evans is a 54 y.o. male with metastatic renal cancer (path still pending) on Keytruda and axitinib with Dr. Coffey who presented with >/= 1 month progressive dyspnea found to have moderate sized BL pleural effusions and likely lymphangitic spread of his malignancy to the chest. Atypical PNA would also be on the ddx but with 1 month duration, lack of fevers, leukocytosis, or sputum production this is felt less likely. He is given a course of doxycycline due to his immunosuppressed state, regardless. He underwent LT thoracentesis 600ml on 3/3/20 with drastic improvement (still requiring some O2) and RT side was attempted 3/4/20 and only 10ml was removed. He was seen by his oncologist Dr. Coffey who has stopped his treatment for the renal cancer as he has progressed despite treatment and there is the possibility of a drug reaction contributing to his acute pulmonary process. He received a dose of IV solumedrol and will be DC'ed on a medrol dosepak per Dr. Ceballos's request. Will have him follow up with Dr. Coffey soon for further management of his malignancy and plans if his effusions  reoccur.    Acute hypoxic respiratory insuffiencey  BL pleural effusions  - based on subacute course and CT findings, suspect lymphangitic spread  - LT thoracentesis 3/3/20 with 600ml, RT thoracentesis 3/4/20 with 10ml only - both sent for path  - 5 days empiric doxycycline to cover bronchitis/atypical PNA in immunosuppressed patient though doubt  - seen by Dr. Coffey, also possibly a drug reaction, medrol dosepak at ID     Diffusely metastatic renal Ca  - reviewed outpatient records, felt to be atypical non-clear cell RCC or a collecting duct carcinoma; final path sent to reference facility  - spine, liver, ?brain mets with known LAD in the chest  - follows with Dr. Pruitt and Dr. Coffey  - ID Keytruda and Inlyta per onc recs     HTN  - cont metoprolol, hytrin     Increased serum creatinine  - likely 2/2 increased muscle mass and left renal cancer      Discharge Follow Up Recommendations for outpatient labs/diagnostics:   Dr. Coffey about 5 days    Day of Discharge     HPI:   Breathing significantly better post thoracentesis today. Repeat planned this afternoon and hoping to go home afterwards. Coughing some but no sputum. Overall feeling much better    Review of Systems  Gen- No fevers, chills  CV- No chest pain, palpitations  Resp- Yes cough, dyspnea  GI- No N/V/D, abd pain    Vital Signs:   Temp:  [97.3 °F (36.3 °C)-98.5 °F (36.9 °C)] 98.1 °F (36.7 °C)  Heart Rate:  [74-86] 84  Resp:  [16-20] 18  BP: (104-139)/(76-94) 139/83     Physical Exam:  Constitutional: No acute distress, awake, alert, sitting up in bed  HENT: NCAT, mucous membranes moist  Respiratory: Diminished breath sounds RT base, LT side clear, no increase work in breathing   Cardiovascular: RRR, no murmurs, rubs, or gallops, palpable radial pulses bilaterally  Gastrointestinal: Positive bowel sounds, soft, nontender, nondistended  Musculoskeletal: No bilateral ankle edema  Psychiatric: Appropriate affect, cooperative  Neurologic: Speech  clear    Pertinent  and/or Most Recent Results     Results from last 7 days   Lab Units 03/03/20  0446 03/02/20  1655   WBC 10*3/mm3 5.79 6.89   HEMOGLOBIN g/dL 14.9 15.1   HEMATOCRIT % 44.0 43.8   PLATELETS 10*3/mm3 184 187   SODIUM mmol/L 138 141   POTASSIUM mmol/L 3.9 4.3   CHLORIDE mmol/L 100 102   CO2 mmol/L 25.0 28.0   BUN mg/dL 21* 21*   CREATININE mg/dL 1.69* 1.69*   GLUCOSE mg/dL 92 110*   CALCIUM mg/dL 8.7 9.0     Results from last 7 days   Lab Units 03/03/20  0446 03/02/20  1655   BILIRUBIN mg/dL  --  0.6   ALK PHOS U/L  --  67   ALT (SGPT) U/L  --  15   AST (SGOT) U/L  --  16   PROTIME Seconds  --  13.1   INR   --  1.02   APTT seconds 26.5 26.0           Invalid input(s): TG, LDLCALC, LDLREALC  Results from last 7 days   Lab Units 03/02/20  1908 03/02/20  1655   TSH uIU/mL  --  4.610*   PROBNP pg/mL  --  148.8   TROPONIN T ng/mL <0.010 <0.010       Brief Urine Lab Results  (Last result in the past 365 days)      Color   Clarity   Blood   Leuk Est   Nitrite   Protein   CREAT   Urine HCG        02/03/20 0919 Yellow Clear Negative Negative Negative Negative               Microbiology Results Abnormal     Procedure Component Value - Date/Time    Body Fluid Culture - Body Fluid, Pleural Cavity [[id number]] Collected:  03/03/20 1509    Lab Status:  Preliminary result Specimen:  Body Fluid from Pleural Cavity Updated:  03/04/20 0908     Body Fluid Culture No growth     Gram Stain Many (4+) WBCs seen      No organisms seen    KOH Prep - Body Fluid, Pleural Cavity [[id number]] Collected:  03/03/20 1509    Lab Status:  Final result Specimen:  Body Fluid from Pleural Cavity Updated:  03/03/20 1620     KOH Prep No yeast or hyphal elements seen          Imaging Results (All)     Procedure Component Value Units Date/Time    CT Guided Thoracentesis [[id number]] Resulted:  03/04/20 1100     Updated:  03/04/20 1145    CT Abdomen Pelvis Without Contrast [[id number]] Collected:  03/04/20 1043     Updated:  03/04/20 1127     Narrative:       EXAMINATION: CT ABDOMEN PELVIS WO CONTRAST-      INDICATION: Followup metastatic kidney cancer; R09.02-Hypoxemia;  W45-Lxydnjy effusion, not elsewhere classified; R06.00-Dyspnea,  unspecified; C34.90-Malignant neoplasm of unspecified part of  unspecified bronchus or lung.     TECHNIQUE: 5 mm unenhanced images through the abdomen and pelvis.     The radiation dose reduction device was turned on for each scan per the  ALARA (As Low as Reasonably Achievable) protocol.     COMPARISON: Abdominal CT scan 12/18/2019.     FINDINGS: History indicates metastatic kidney cancer. Followup. Previous  exam report from 12/18/2019 included multiple liver lesions,  multiloculated cystic mass of the left kidney, enlarged masslike right  adrenal gland, retroperitoneal lymphadenopathy.     Today's study shows new pleural effusions, small left, small to moderate  on the right, and denser interstitial disease of the lower lungs  resembling interstitial edema. Multiple liver lesions appear similar to  the prior exam for unenhanced study. Among the larger lesions, the  inferior right liver lobe lesion measures approximately 26 mm unchanged.  The central superior lesion, approximately segment 8 measures  approximately 29 mm previously 24 mm. Scattered other relatively small  lesions generally appear a couple of mm larger than on the prior study.     Small gallstone is again seen in the otherwise normal-appearing  gallbladder. Enlarged right adrenal gland, enlarged left adrenal gland,  and retroperitoneal nodes are again noted, commonly adrenal glands both  approximately 3 or 4 mm larger than on the prior study.     Regarding the lower abdomen and pelvis, there are again shotty  retroperitoneal lymph nodes. No inguinal or pelvic sidewall adenopathy  is seen. No mass or inflammatory change is seen. Bladder is moderately  distended and normal in appearance. There is slight progression of the  destructive lesion in the right  pedicle of L2.       Impression:       1. Mild interval progression of liver metastatic disease, and adrenal  disease compared to 12/18/2019 abdomen and pelvis CT scan.  2. Mild progression of lytic change of the right pedicle of L2.   3. No significant overall change in appearance of patient's left upper  pole renal mass, perinephric fat stranding, and retroperitoneal  adenopathy.  4. Small new pleural effusions, and dense interstitial disease lower  lungs, which resembles interstitial edema.     D:  03/04/2020  E:  03/04/2020       NM Bone Scan Whole Body [[id number]] Resulted:  03/04/20 1106     Updated:  03/04/20 1106    MRI Brain Without Contrast [[id number]] Collected:  03/03/20 1843     Updated:  03/03/20 1855    Narrative:       EXAMINATION: MRI BRAIN WO CONTRAST-     INDICATION: follow up metastatic kidney cancer; R09.02-Hypoxemia;  O89-Midthkx effusion, not elsewhere classified; R06.00-Dyspnea,  unspecified; C34.90-Malignant neoplasm of unspecified part of  unspecified bronchus or lung      TECHNIQUE: Multiplanar MRI of the brain without intravenous contrast     COMPARISON: MRI brain dated 02/20/2020     FINDINGS: No restriction on diffusion-weighted sequences. Midline  structures are symmetric without evidence of mass, mass effect or  midline shift. Ventricles and sulci within normal limits. Mild amount of  increased signal findings in the periventricular deep white matter  greatest right frontal lobe however occurring within the bifrontal  regions of the supratentorial white matter likely mild chronic small  vessel ischemic disease involvement. Enlarged perivascular spaces in the  basal ganglia right greater than left previously without abnormal  enhancement on prior imaging comparison. Pituitary and sella within  normal limits. Cervicomedullary junction demonstrates low-lying  cerebellar tonsils at or just below the level of this foramen magnum  without distinct greater than 5 mm to suggest Chiari  malformation  consistent tonsillar ectopia. Globes and orbits retain normal T2 signal  characteristics. Visualized paranasal sinuses and mastoid air cells are  grossly clear well-pneumatized. No cerebellopontine angle mass lesion.  Normal signal flow voids the distal internal carotid and vertebrobasilar  arteries.          Impression:       Mild senescent changes of mild chronic small vessel ischemic  disease involvement without acute infarction midline shift or obvious  mass occupying focus however limited due to lack of intravenous contrast  which should be recommended for definitive follow-up of potential  metastatic involvement in a patient with known malignancy.         This report was finalized on 3/3/2020 6:52 PM by Dr. Fabián Francois.       CT Guided Thoracentesis [[id number]] Resulted:  03/03/20 1443     Updated:  03/03/20 1518    CT Soft Tissue Neck Without Contrast [[id number]] Collected:  03/02/20 1842     Updated:  03/03/20 1203    Narrative:       EXAMINATION: CT SOFT TISSUE NECK WO CONTRAST-, CT CHEST WO CONTRAST-      INDICATION: Followup lung cancer, difficulty swallowing.     TECHNIQUE: Multiple axial CT imaging was obtained of the neck and chest  without the administration of intravenous contrast.     The radiation dose reduction device was turned on for each scan per the  ALARA (As Low as Reasonably Achievable) protocol.     COMPARISON: None.     FINDINGS: Thyroid is homogeneous in appearance. There is no significant  adenopathy identified within the soft tissues of the neck. There are old  lymph nodes identified throughout the supraclavicular regions  bilaterally which are mildly enlarged. There are findings concerning for  reactive adenopathy, however, metastatic disease cannot be excluded. The  largest lymph node seen in the supraclavicular region on the left  posteriorly is 1.5 cm. There is a lymph node identified at the base of  the right neck measuring 1.2 cm. No other adenopathy is identified.  The  visualized paranasal sinuses are clear. The mastoid air cells are  patent. The chest reveals adenopathy throughout the mediastinum for  example there is a paratracheal lymph node on the right measuring 2.4  cm. Bilateral pleural effusions moderate in size with fibronodular  densities seen diffusely throughout the lung fields bilaterally.  Findings may suggest a diffuse airspace disease such as infiltrate  pneumonia, however, lymphangitic spread of disease cannot be completely  excluded and clinical correlation is needed. The cardiac chambers are  within normal limits. No pericardial effusion. Visualized upper abdomen  reveals abnormality identified within the right kidney. There is  abnormality identified within the adrenal gland on the right and left.  Multiple liver lesions are identified. There are stones seen in the  gallbladder. Adenopathy throughout the retroperitoneum. Splenule or soft  tissue implant identified of the left upper quadrant. The bony  structures reveal degenerative changes seen within the spine.       Impression:       Moderate size bilateral pleural effusions with adenopathy  throughout the supraclavicular region bilaterally. Tiny fibronodular  densities seen diffusely throughout the lung fields with some air  bronchogram centrally suggesting either lymphangitic spread of disease  or diffuse infectious process. Clinical correlation is needed. The upper  abdomen reveals extensive disease with involvement of the right kidney,  bilateral adrenal glands, adenopathy in the retroperitoneum as well as  extensive disease throughout the liver. Tiny stone identified within the  gallbladder.     D:  03/02/2020  E:  03/03/2020       CT Chest Without Contrast [[id number]] Collected:  03/02/20 1842     Updated:  03/03/20 1203    Narrative:       EXAMINATION: CT SOFT TISSUE NECK WO CONTRAST-, CT CHEST WO CONTRAST-      INDICATION: Followup lung cancer, difficulty swallowing.     TECHNIQUE: Multiple axial  CT imaging was obtained of the neck and chest  without the administration of intravenous contrast.     The radiation dose reduction device was turned on for each scan per the  ALARA (As Low as Reasonably Achievable) protocol.     COMPARISON: None.     FINDINGS: Thyroid is homogeneous in appearance. There is no significant  adenopathy identified within the soft tissues of the neck. There are old  lymph nodes identified throughout the supraclavicular regions  bilaterally which are mildly enlarged. There are findings concerning for  reactive adenopathy, however, metastatic disease cannot be excluded. The  largest lymph node seen in the supraclavicular region on the left  posteriorly is 1.5 cm. There is a lymph node identified at the base of  the right neck measuring 1.2 cm. No other adenopathy is identified. The  visualized paranasal sinuses are clear. The mastoid air cells are  patent. The chest reveals adenopathy throughout the mediastinum for  example there is a paratracheal lymph node on the right measuring 2.4  cm. Bilateral pleural effusions moderate in size with fibronodular  densities seen diffusely throughout the lung fields bilaterally.  Findings may suggest a diffuse airspace disease such as infiltrate  pneumonia, however, lymphangitic spread of disease cannot be completely  excluded and clinical correlation is needed. The cardiac chambers are  within normal limits. No pericardial effusion. Visualized upper abdomen  reveals abnormality identified within the right kidney. There is  abnormality identified within the adrenal gland on the right and left.  Multiple liver lesions are identified. There are stones seen in the  gallbladder. Adenopathy throughout the retroperitoneum. Splenule or soft  tissue implant identified of the left upper quadrant. The bony  structures reveal degenerative changes seen within the spine.       Impression:       Moderate size bilateral pleural effusions with adenopathy  throughout  the supraclavicular region bilaterally. Tiny fibronodular  densities seen diffusely throughout the lung fields with some air  bronchogram centrally suggesting either lymphangitic spread of disease  or diffuse infectious process. Clinical correlation is needed. The upper  abdomen reveals extensive disease with involvement of the right kidney,  bilateral adrenal glands, adenopathy in the retroperitoneum as well as  extensive disease throughout the liver. Tiny stone identified within the  gallbladder.     D:  03/02/2020  E:  03/03/2020       XR Chest 1 View [[id number]] Collected:  03/02/20 1713     Updated:  03/03/20 1106    Narrative:       EXAMINATION: XR CHEST 1 VW-03/02/2020:      INDICATION: SOA, triage protocol.      COMPARISON: NONE.     FINDINGS: Portable chest reveals the cardiac silhouette to be enlarged.  Increased pulmonary vascularity bilaterally. Small bilateral pleural  effusions with minimal increased markings at the lung bases.  Degenerative changes seen within the spine.           Impression:       Worsening of the airspace disease seen within the lung  fields bilaterally. Findings suggesting either diffuse edema or  pneumonia. Small bilateral pleural effusions. The heart is borderline  enlarged. Clinical correlation is needed.     D:  03/02/2020  E:  03/03/2020                   Results for orders placed during the hospital encounter of 01/29/20   Adult Transthoracic Echo Complete W/ Cont if Necessary Per Protocol    Narrative · Estimated EF = 50-55%.  · Left ventricular systolic function is low normal.  · Mild tricuspid valve regurgitation is present.  · Calculated right ventricular systolic pressure from tricuspid   regurgitation is 27 mmHg.  · The global longitudinal strain is calculated at -17 which is slightly   less than normal (normal is -20)           Order Current Status    Non-gynecologic Cytology In process    Body Fluid Culture - Body Fluid, Pleural Cavity Preliminary result         Discharge Details        Discharge Medications      New Medications      Instructions Start Date   doxycycline 100 MG capsule  Commonly known as:  MONODOX   100 mg, Oral, Every 12 Hours Scheduled      guaiFENesin-dextromethorphan 100-10 MG/5ML syrup  Commonly known as:  ROBITUSSIN DM   5 mL, Oral, Every 6 Hours PRN      HYDROcod Polst-CPM Polst ER 10-8 MG/5ML ER suspension  Commonly known as:  TUSSIONEX PENNKINETIC   2.5 mL, Oral, Every 12 Hours PRN      methylPREDNISolone 4 MG tablet  Commonly known as:  MEDROL (FELECIA)   Take as directed on package instructions.         Changes to Medications      Instructions Start Date   doxazosin 8 MG tablet  Commonly known as:  CARDURA  What changed:    · how much to take  · when to take this  · additional instructions   2 tabs po daily         Continue These Medications      Instructions Start Date   albuterol sulfate  (90 Base) MCG/ACT inhaler  Commonly known as:  PROVENTIL HFA;VENTOLIN HFA;PROAIR HFA   2 puffs, Inhalation, Every 4 Hours PRN      benzonatate 100 MG capsule  Commonly known as:  TESSALON PERLES   200 mg, Oral, 3 Times Daily PRN      hydroCHLOROthiazide 25 MG tablet  Commonly known as:  HYDRODIURIL   25 mg, Oral, Daily      losartan 50 MG tablet  Commonly known as:  COZAAR   100 mg, Oral, Daily      metoprolol succinate XL 50 MG 24 hr tablet  Commonly known as:  TOPROL-XL   100 mg, Daily      ondansetron 8 MG tablet  Commonly known as:  ZOFRAN   8 mg, Oral, Every 8 Hours PRN      pantoprazole 40 MG EC tablet  Commonly known as:  PROTONIX   40 mg, Oral, Daily      promethazine 12.5 MG tablet  Commonly known as:  PHENERGAN   12.5 mg, Oral, Every 6 Hours PRN      traMADol 50 MG tablet  Commonly known as:  ULTRAM   50 mg, Oral, Every 6 Hours PRN         Stop These Medications    axitinib 5 MG chemo tablet  Commonly known as:  INLYTA     KEYTRUDA IV            No Known Allergies      Discharge Disposition:  Home or Self Care    Diet:  Hospital:  Diet Order    Procedures   • Diet Regular       Activity:  Activity Instructions     Gradually Increase Activity Until at Pre-Hospitalization Level          CODE STATUS:    Code Status and Medical Interventions:   Ordered at: 03/02/20 2052     Code Status:    CPR     Medical Interventions (Level of Support Prior to Arrest):    Full       Future Appointments   Date Time Provider Department Center   3/4/2020  3:00 PM [facility] 1 [facility]   3/12/2020  9:30 AM Vicenta Pearce MD [facility] None   3/16/2020 11:00 AM Tila Nickerson APRN [facility]   3/16/2020 11:30 AM CHAIR 16 [facility] OPI [facility]   8/27/2020  1:00 PM Payal Orantes APRN [facility] None       Additional Instructions for the Follow-ups that You Need to Schedule     Discharge Follow-up with Specified Provider: Dr. Coffey; 5 Days   As directed      To:  Dr. Coffey    Follow Up:  5 Days    Follow Up Details:  Monday next week               Time Spent on Discharge:  33 minutes    Electronically signed by Graeme Rhodes DO, 03/04/20, 2:20 PM.

## 2020-03-05 NOTE — OUTREACH NOTE
Prep Survey      Responses   [facility] facility patient discharged from?  [place]   Is LACE score < 7 ?  No   Eligibility  Readm Mgmt   Discharge diagnosis  Hypoxia metastatic renal cancer    Does the patient have one of the following disease processes/diagnoses(primary or secondary)?  Other   Does the patient have Home health ordered?  No   Is there a DME ordered?  No   Prep survey completed?  Yes          Clara Shin RN

## 2020-03-05 NOTE — TELEPHONE ENCOUNTER
Pt sister, Felecia, called stating pt was recently discharged from hospital on 3/4/20 and was told to schedule a follow-up appt on 3/9/20 with Dr. Coffey.   Please call Felecia back with a scheduled appt on 3/9/20 at [phone].

## 2020-03-06 NOTE — OUTREACH NOTE
Medical Week 1 Survey      Responses   [facility] [place] by [facility] patient discharged from?  [place]   Does the patient have one of the following disease processes/diagnoses(primary or secondary)?  Other   Is there a successful TCM telephone encounter documented?  No   Week 1 attempt successful?  Yes   Call start time  1137   Call end time  1142   Discharge diagnosis  Hypoxia metastatic renal cancer    Meds reviewed with patient/caregiver?  Yes   Is the patient having any side effects they believe may be caused by any medication additions or changes?  No   Does the patient have all medications ordered at discharge?  Yes   Is the patient taking all medications as directed (includes completed medication regime)?  Yes   Medication comments  Not taking chemo right now   Does the patient have a primary care provider?   Yes   Does the patient have an appointment with their PCP within 7 days of discharge?  Yes   Has the patient kept scheduled appointments due by today?  N/A   Has home health visited the patient within 72 hours of discharge?  N/A   Psychosocial issues?  No   Did the patient receive a copy of their discharge instructions?  Yes   Nursing interventions  Reviewed instructions with patient, Educated on MyChart   What is the patient's perception of their health status since discharge?  Improving   Is the patient/caregiver able to teach back signs and symptoms related to disease process for when to call PCP?  Yes   Is the patient/caregiver able to teach back signs and symptoms related to disease process for when to call 911?  Yes   Is the patient/caregiver able to teach back the hierarchy of who to call/visit for symptoms/problems? PCP, Specialist, Home health nurse, Urgent Care, ED, 911  Yes   Additional teach back comments  Pt doing better. Will keep up all f/u appts.   Week 1 call completed?  Yes          Leny Holt RN

## 2020-03-12 PROBLEM — J90 PLEURAL EFFUSION: Status: ACTIVE | Noted: 2020-01-01

## 2020-03-12 PROBLEM — C64.9: Status: ACTIVE | Noted: 2020-01-01

## 2020-03-12 NOTE — ACP (ADVANCE CARE PLANNING)
Pt has adult daughter and mother.  He wants his sister Felecia Sandoval to be HCS.  She is primary caregiver.

## 2020-03-12 NOTE — PROGRESS NOTES
PALLIATIVE CARE PROGRESS NOTE:    Lawanda Evans is a 54 y.o. male.     History of Present Illness   Referring/Oncology:  Jose Coffey MD and Tila GALVEZ  Primary Care:  Juan Mccarty MD  NS:  Michael Rizo MD     [id number] with stage IV renal cell carcinoma metastatic to mediastinal LNs, lung nodules, rib metastases, liver, bilateral adrenal glands, and brain (R frontotemporal), L2 with extension to paraspinal soft tissues and posterior extradural spinal canal with compression of thecal sac at L2  Dx 12/19/19 after persistent and rapidly escalating back pain that he contributed to [facility] pains related to horse riding.     Treatment history:   S/p SRT to L spine metastasis 1/9/20.  Axitinib, Pembro. Received Pembro Cycle #2 on 2/3/2020. Stopped due to respiratory SE.    Interim history:  Hospitalization 3/2-3/4 for about progressive dyspnea noted at last visit.  Found to have bilateral pleural effusions and likely lymphangitic spread to chest.  Underwent thoracentesis inpatient, removed 600mL form left chest and 10mL from right chest.  Treated with steroids as possible drug reaction to his cancer treatment.  Sees Dr. Coffey following this appt.      CT neck/chest 3/2/30:  IMPRESSION:  Moderate size bilateral pleural effusions with adenopathy  throughout the supraclavicular region bilaterally. Tiny fibronodular  densities seen diffusely throughout the lung fields with some air  bronchogram centrally suggesting either lymphangitic spread of disease  or diffuse infectious process. Clinical correlation is needed. The upper  abdomen reveals extensive disease with involvement of the right kidney,  bilateral adrenal glands, adenopathy in the retroperitoneum as well as  extensive disease throughout the liver. Tiny stone identified within the  Gallbladder.    MRI brain 3/3/20:  IMPRESSION:  Mild senescent changes of mild chronic small vessel ischemic  disease involvement without acute infarction midline shift  "or obvious  mass occupying focus however limited due to lack of intravenous contrast  which should be recommended for definitive follow-up of potential  metastatic involvement in a patient with known malignancy.    Bone scan 3/4/20:  IMPRESSION:  Minimal abnormal activity identified within the bony  skeleton involving the sternum, lower lumbar spine and sacrum and pelvis  concerning for possible metastatic lesions. There is abnormality  associated to these areas on the CT scan confirming metastatic  involvement.     Pain: \"like I've been in a car wreck\" of entire back continuous pain.  Worse with coughing and ambulation.  Recurrence of previously resolved low mid back continuous throbbing ache with numbness of R leg today with urination.       Medication management:  Tramadol 100mg not helpful.  Tussionex helps pain and cough.    Symptoms: Increasing dyspnea with exertion.  Using transport chair today due to this.  Has not qualified for home oxygen.  91-95% at rest on room air.  He got cold sweat and fell out of chair onto floor at checkout.  BS 87.  RA O2 sat 93%.  Too dizzy to stand up for ambulatory O2 sat.     Function: Supervision with transfers and ambulation.  Otherwise independent of ADLs     Support/Strengths:  Currently staying with his sister in [place].  She is APRN hospitalist here.  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  Practices mindfulness meditation daily.  Healthy athlete, healthy diet, and vitamin supplements.     Distress/Difficulties: None expressed today. Note that he is resistant to any daily medication regimen and particularly does not want any medications that may cause drowsiness. Noted family history of early CAD, father with MI in his 50s.     Substance Use History: never use     ACP/Goals: Introduced conversation today.  15 minutes today.  Agreeable and desires LW to assign HCS.    The following portions of the patient's " history were reviewed and updated as appropriate: allergies, current medications, past family history, past medical history, past social history, past surgical history and problem list.    Review of Systems  Otherwise negative except as below and as already detailed in HPI.    RAFAL:  Reviewed.  See scanned form in Media. No concerns.  Consistent with history.  Prescribers identified as members of care team.     Medication Counts:  Reviewed.  See RN note. Did not bring medication to appointment    CONTROLLED SUBSTANCE TRACKING 1/21/2020 2/13/2020 3/12/2020   Last Rafal 1/20/2020 2/12/2020 3/11/2020   Report Number [id number]   Last UDS - 1/21/2020 2/13/2020   Last Controlled Substance Agreement - 1/21/2020 1/21/2020   ORT Initial Risk Score 1 1 1   Prior UDT result - Expected Expected   Pill count Did not bring Did not bring Did not bring   Disposal Education and Agreement [id number]       UDS:  THC, Screen, Urine   Date Value Ref Range Status   02/13/2020 Negative Negative Final     Phencyclidine (PCP), Urine   Date Value Ref Range Status   02/13/2020 Negative Negative Final     Cocaine Screen, Urine   Date Value Ref Range Status   02/13/2020 Negative Negative Final     Methamphetamine, Ur   Date Value Ref Range Status   02/13/2020 Negative Negative Final     Opiate Screen   Date Value Ref Range Status   02/13/2020 Negative Negative Final     Amphetamine Screen, Urine   Date Value Ref Range Status   02/13/2020 Negative Negative Final     Benzodiazepine Screen, Urine   Date Value Ref Range Status   02/13/2020 Negative Negative Final     Tricyclic Antidepressants Screen   Date Value Ref Range Status   02/13/2020 Negative Negative Final     Methadone Screen, Urine   Date Value Ref Range Status   02/13/2020 Negative Negative Final     Barbiturates Screen, Urine   Date Value Ref Range Status   02/13/2020 Negative Negative Final     Oxycodone Screen, Urine   Date Value Ref Range Status    02/13/2020 Negative Negative Final     Propoxyphene Screen   Date Value Ref Range Status   02/13/2020 Negative Negative Final     Buprenorphine, Screen, Urine   Date Value Ref Range Status   02/13/2020 Negative Negative Final     Palliative Performance Scale  Palliative Performance Scale Score: 60%    [place] Symptom Assessment System Revised  Pain Score: 8   ESAS Tiredness Score: 8  ESAS Nausea Score: 7  ESAS Depression Score: No depression  ESAS Anxiety Score: No anxiety  ESAS Drowsiness Score: 6  ESAS Lack of Appetite Score: No lack of appetite  ESAS Wellbeing Score: Best wellbeing  ESAS Dyspnea Score: 8  ESAS Other Problem Score: Best possible response  ESAS Source of Information: patient    BROWN-7:    Over the last two weeks, how often have you been bothered by the following problems?  Feeling nervous, anxious or on edge: Not at all  Not being able to stop or control worrying: Not at all  Worrying too much about different things: Not at all  Trouble Relaxing: Not at all  Being so restless that it is hard to sit still: Not at all  Becoming easily annoyed or irritable: Not at all  Feeling afraid as if something awful might happen: Not at all  BROWN 7 Total Score: 0    PHQ-9:  PHQ-2/PHQ-9 Depression Screening 3/12/2020   Little interest or pleasure in doing things 1   Feeling down, depressed, or hopeless 0   Trouble falling or staying asleep, or sleeping too much 0   Feeling tired or having little energy 2   Poor appetite or overeating 0   Feeling bad about yourself - or that you are a failure or have let yourself or your family down 0   Trouble concentrating on things, such as reading the newspaper or watching television 0   Moving or speaking so slowly that other people could have noticed. Or the opposite - being so fidgety or restless that you have been moving around a lot more than usual 0   Thoughts that you would be better off dead, or of hurting yourself in some way 0   Total Score 3        ECOG: (2)  Ambulatory and capable of self care, unable to carry out work activity, up and about > 50% or waking hours    Objective   Physical Exam   Constitutional: He is oriented to person, place, and time. He appears well-developed and well-nourished. He appears distressed.   HENT:   Head: Normocephalic and atraumatic.   Eyes: Pupils are equal, round, and reactive to light. EOM are normal.   Cardiovascular: Normal rate, regular rhythm, normal heart sounds and intact distal pulses.   No murmur heard.  Pulmonary/Chest: Accessory muscle usage present. No respiratory distress. He has decreased breath sounds in the left middle field and the left lower field.   Dry cough after deep inspiration   Abdominal: Soft. Bowel sounds are normal.   Musculoskeletal: He exhibits tenderness. He exhibits no edema.        Lumbar back: He exhibits bony tenderness.   Neurological: He is alert and oriented to person, place, and time.   Skin: Skin is warm. He is diaphoretic.   Psychiatric: He has a normal mood and affect. His speech is normal. Judgment and thought content normal. He is withdrawn. Cognition and memory are normal. He is inattentive.   Nursing note and vitals reviewed.        Assessment/Plan   Alphonso was seen today for appointment, follow-up and renal cancer.    Diagnoses and all orders for this visit:    Bone metastases (CMS/HCC)  -     Ambulatory Referral to Advance Care Planning    Cancer related pain    Renal cancer, left (CMS/HCC)  -     Ambulatory Referral to Advance Care Planning    Hypoxia  -     Discontinue: HYDROcod Polst-CPM Polst ER (TUSSIONEX PENNKINETIC) 10-8 MG/5ML ER suspension; Take 2.5 mL by mouth Every 6 (Six) Hours As Needed for Cough.    Cough  -     oxyCODONE-acetaminophen (PERCOCET) 7.5-325 MG per tablet; 1-2 tabs every 6 hours as needed for pain  -     guaiFENesin-Codeine 100-6.3 MG/5ML solution; Take 10 mL by mouth 3 (Three) Times a Day As Needed (cough).    Other orders  -     senna-docusate sodium (SENOKOT-S)  8.6-50 MG tablet; Take 2 tablets by mouth Daily for 30 days.           Universal precautions:    ORT risk:  Low risk  Aberrant behavior:  None.  UDT and PDMP as expected  Indication:  Cancer inflammatory bone pain and radiculopathy of L2 and L4 malignant process  OME:  60mg allowed  Naloxone prescribed:  no     Advance care plan:    -  Healthcare decision making plan: No LW on file.  Wants one today.  Wants sister Felecia Sandoval to be HCS.  Has a daughter and a mother as well.    -  MOST discussions thus far:  Introduced general concept.  Pt was receptive but has not given that thought. Felecia states she feels comfortable in making decisions if/when he does not have capacity.      Total face to face time spent:  25 min.  Greater than 50% time spent in counseling and discussion re: -  15 minutes advance care planning   10 minutes symptom management - restart Percocet 7.5/325mg tabs he has at home, take 1-2 tabs q6h PRN.  Call in morning to report efficacy or SEs as well as # tabs he has so we can plan appropriate refills.    Advised he/they are low risk of abuse/diversion of meds.  We can do best phone support of severe symptoms related to advanced cancer and adjust controlled medications that are necessary to manage these symptoms prior to f/u appt.     Care coordination:    - spoke with Romeo Monahan pharmacist - Tussionex is not covered by insurance, switched to Robitussin AC  -  Called chaplain Zavaleta to get LW signed today for healthcare surrogate designation  - F/u within 3 weeks at [facility] appt

## 2020-03-12 NOTE — OUTREACH NOTE
Medical Week 2 Survey      Responses   [facility] patient discharged from?  [place]   Does the patient have one of the following disease processes/diagnoses(primary or secondary)?  Other   Week 2 attempt successful?  Yes   Call start time  1430   Discharge diagnosis  Hypoxia metastatic renal cancer    Rescheduled  Rescheduled-pt requested [at appt]          Shashi Luciano RN

## 2020-03-12 NOTE — ACP (ADVANCE CARE PLANNING)
I met with patient to assist in completing a living will, provided education.  Patient completed living will and selected sister, Felecia Sandoval, as his healthcare surrogate. Notarized living will and provided copies and the original to the patient, and a copy to the RN.

## 2020-03-12 NOTE — PROGRESS NOTES
CHIEF COMPLAINT: Collecting duct carcinoma    Problem List:  Oncology/Hematology History    1. Kidney cancer  2. Biopsy-proven liver metastases  3. Radiographic evidence of bone metastases and lung metastases  4. Probable adrenal metastases    Oncology timeline:  -12/18/2019 presented to emergency room with back pain.  Started while riding horses 12/11/2019 but began suddenly.  Went to chiropractor with no results.  Went to  emergency room on 12/14/2018 due to the pain radiating down into his lower extremities and they gave him steroids and a muscle relaxer.  12/18/2019 MRI emergency room showed L4 osseous tumor with extension into the paraspinal soft tissues as well as posterior extradural spinal canal with severe compression of the thecal sac at L2.  CTs of chest abdomen pelvis revealed mediastinal adenopathy, multiple lung nodules concerning for metastasis, and likely rib metastases.  There are abnormalities in the liver consistent with metastasis and a 6.3 cm left renal mass likely the primary as well as a left adrenal mass and involvement of the right adrenal gland.  MRI of lumbar and thoracic spine revealed multiple degenerative changes.  Saw Dr. Rizo who did not recommend surgery and they recommended CyberKnife radiation for which Dr. Madrigal evaluated 12/18/2019.  -12/20/2019 CT-guided liver mass biopsy suggestive of atypical non-clear-cell renal cell carcinoma sent to [facility] for further consultation.  Favor white count of 15,000, CBC and CMP unremarkable.  PSA normal at 2.69.  Per Dr. Pruitt this could be a collecting duct carcinoma or another rare variant of a kidney cancer.    If this is a collecting duct carcinoma, this would be a very aggressive subtype of renal cell carcinoma.  Patients with a fumarate hydratase genomic alteration might benefit from M tor inhibitors particularly if there is in an NF2 alteration.  [id number] suggests cisplatin resistance.  CDKN2A is seen in about 62% of these.   Prospective trials are limited in number and number of patients but older data suggests benefit of cisplatin gemcitabine while newer data casts doubt on such.  There are also conflicting reports of response and lack thereof to targeted therapy such as tyrosine kinase inhibitors with sorafenib and sunitinib and Cabozantanib having variable responses between 30 to 50%.  PDL 1 expression is highly variable and immunotherapy while not universally helpful may have a role.  There is a clinical trial looking at gemcitabine cisplatin plus up Avastin (GETA UG-AF u24) as well as cabozantinib (Bonsai trial).  In the meantime, I will prepare for high risk kidney cancer standard therapy with Keytruda axitinib while waiting on the above molecular testing and data [facility].  We will get an MRI of his brain as well as he has some dizziness and we need completion staging.  I have contacted radiation oncology to have them follow-up for spine radiation as his right leg is getting more numb.  We will also get in with our palliative care clinic for pain management.  We will also get an MRI of his neck with numbness now happening in his left arm.    -1/6/2020 MRI of the brain shows at least 2 sites of right frontotemporal involvement of abnormal enhancement concerning for metastatic disease.  MRI of the cervical spine negative other than for minimal degenerative changes.  However, per Dr. Grimaldo who had communication with Dr. Rizo, Dr. Rizo was not convinced of these being metastatic.    -1/13/2020: Started Keytruda axitinib    -2/10/2020 CT PE protocol showed no evidence of pulmonary embolus.  There was hilar and mediastinal adenopathy with reticulonodular bilateral pulmonary changes.  Ill-defined 5.6 cm cystic mass upper pole left kidney.  Ill-defined liver lesions largest 2.2 cm consistent with metastasis.    -2/20/2020 MRI brain showed small right-sided hypoenhancing lesions nonspecific and  unchanged from 1/6/2020 with no new disease.  Status post CyberKnife to L2 by Dr. Rizo    -3/4/2020 CT chest abdomen pelvis showed mild increased liver metastases and L2 with a stable left renal mass and retroperitoneal lymphadenopathy stable.      Total body bone scan show sternal, lower lumbar, sacral, and pelvic metastases.    MRI brain limited due to lack of contrast but no clear-cut metastasis    Pleural fluid cytology positive for renal cell carcinoma.        Lumbar spine tumor    12/18/2019 Initial Diagnosis     Lumbar spine tumor      1/9/2020 - 1/9/2020 Radiation     Radiation OncologyTreatment Course:  Alphonso Evans received 1400 cGy in 1 fractions to L-spine metastatsis via Stereotactic Radiation Therapy - SRT.        Renal cancer, left (CMS/HCC)    12/22/2019 Initial Diagnosis     Renal cancer, left (CMS/HCC)      12/30/2019 Cancer Staged     Staging form: Kidney, AJCC 8th Edition  - Clinical: Stage IV (cT1b, cN0, pM1) - Signed by Jose Coffey MD on 12/30/2019 1/6/2020 Imaging     MRI of the brain IMPRESSION:  At least 2 sites of right frontotemporal involvement of  abnormal enhancement on postcontrast sequences concerning for  intracranial metastasis as these are not well identified on precontrast  T1 sequences with attention on followup. Mild chronic small vessel  ischemic disease without acute intracranial findings otherwise noted.  However, per discussion with Dr. Grimaldo, the images were reviewed by Dr. Rizo who is not convinced of these being malignant metastases.  MRI cervical spine IMPRESSION:  Minimal degenerative changes with no significant disc  protrusion, spinal stenosis or abnormal cord finding.      1/13/2020 - 3/15/2020 Chemotherapy     OP KIDNEY Axitinib / Pembrolizumab      2/10/2020 Imaging     CT PE protocol showed no evidence of pulmonary embolus.  There was hilar and mediastinal adenopathy with reticulonodular bilateral pulmonary changes.  Ill-defined 5.6 cm cystic  mass upper pole left kidney.  Ill-defined liver lesions largest 2.2 cm consistent with metastasis.      2/20/2020 Imaging     MRI brain :Small right-sided hypoenhancing lesions, nonspecific in  appearance but unchanged from previous 01/06/2020 exam. No new or  increasing intracranial abnormality to suggest metastatic disease      3/2/2020 Imaging     CT chest without contrast showed bilateral pleural effusions with diffuse interstitial changes.      3/4/2020 Progression     CT chest abdomen pelvis showed mild increased liver metastases and L2 with a stable left renal mass and retroperitoneal lymphadenopathy stable.      Total body bone scan show sternal, lower lumbar, sacral, and pelvic metastases.    MRI brain limited due to lack of contrast but no clear-cut metastasis    Pleural fluid cytology positive for renal cell carcinoma      3/20/2020 -  Chemotherapy     OP BLADDER CARBOplatin / Gemcitabine        Collecting duct carcinoma (CMS/HCC)    1/13/2020 - 3/15/2020 Chemotherapy     OP KIDNEY Axitinib / Pembrolizumab      3/12/2020 Initial Diagnosis     Collecting duct carcinoma (CMS/HCC)      3/20/2020 -  Chemotherapy     OP BLADDER CARBOplatin / Gemcitabine         HISTORY OF PRESENT ILLNESS:  The patient is a 54 y.o. male, here for follow up on management of collecting duct carcinoma of kidney.  Was dizzy in the waiting room and went down briefly but with normal vital signs and never lost consciousness and says he is feeling fine now with normal vital signs.  In the last 24 hours has felt weaker with a little more dyspnea.  Last night he had some chills though he did not check his temperature.  Has had no viral exposures or exposure to people who have traveled      Past Medical History:   Diagnosis Date   • Cancer (CMS/HCC)     Renal, Spine, Brain   • Hypertension      Past Surgical History:   Procedure Laterality Date   • CYBERKNIFE Right 01/09/2020    Right paraspinal lesion behind the L2 vertebral body-   "Michael Rizo    • KNEE SURGERY Left     x 3   • ROTATOR CUFF REPAIR Right 2014       No Known Allergies    Family History and Social History reviewed and changed as necessary      REVIEW OF SYSTEM:   Review of Systems   Constitutional: Negative for appetite change, chills, diaphoresis, fatigue, fever and unexpected weight change.   HENT:   Negative for mouth sores, sore throat and trouble swallowing.    Eyes: Negative for icterus.   Respiratory: Negative for cough, hemoptysis and shortness of breath.    Cardiovascular: Negative for chest pain, leg swelling and palpitations.   Gastrointestinal: Negative for abdominal distention, abdominal pain, blood in stool, constipation, diarrhea, nausea and vomiting.   Endocrine: Negative for hot flashes.   Genitourinary: Negative for bladder incontinence, difficulty urinating, dysuria, frequency and hematuria.    Musculoskeletal: Negative for gait problem, neck pain and neck stiffness.   Skin: Negative for rash.   Neurological: Negative for dizziness, gait problem, headaches, light-headedness and numbness.   Hematological: Negative for adenopathy. Does not bruise/bleed easily.   Psychiatric/Behavioral: Negative for depression. The patient is not nervous/anxious.    All other systems reviewed and are negative.       PHYSICAL EXAM    Vitals:    03/12/20 1410   BP: 99/65   Pulse: 96   Resp: 16   Temp: 97.8 °F (36.6 °C)   SpO2: 93%   Weight: 84.4 kg (186 lb)   Height: 167.6 cm (66\")     Vitals:    03/12/20 1410   PainSc: 8  Comment: back and rib pain = 8 x2 days ago        Constitutional: Appears well-developed and well-nourished. No distress.   ECOG: (2) Ambulatory & Capable of Self Care, Unable to Carry Out Work Activity, Up & About Greater Than 50% of Waking Hours  HENT:   Head: Normocephalic.   Mouth/Throat: Oropharynx is clear and moist.   Eyes: Conjunctivae are normal. Pupils are equal, round, and reactive to light. No scleral icterus.   Neck: Neck supple. No JVD present. " No thyromegaly present.   Cardiovascular: Normal rate, regular rhythm and normal heart sounds.    Pulmonary/Chest: Decreased breath sounds in the bases  Abdominal: Soft. Exhibits no distension and no mass. There is no hepatosplenomegaly. There is no tenderness. There is no rebound and no guarding.   Musculoskeletal:Exhibits no edema, tenderness or deformity.   Neurological: Alert and oriented to person, place, and time. Exhibits normal muscle tone.   Skin: No ecchymosis, no petechiae and no rash noted. Not diaphoretic. No cyanosis. Nails show no clubbing.   Psychiatric: Normal mood and affect.   Vitals reviewed.      Lab Results   Component Value Date    HGB 14.9 03/03/2020    HCT 44.0 03/03/2020    MCV 86.8 03/03/2020     03/03/2020    WBC 5.79 03/03/2020    NEUTROABS 3.69 03/03/2020    LYMPHSABS 1.28 03/03/2020    MONOSABS 0.61 03/03/2020    EOSABS 0.12 03/03/2020    BASOSABS 0.04 03/03/2020       Lab Results   Component Value Date    GLUCOSE 92 03/03/2020    BUN 21 (H) 03/03/2020    CREATININE 1.69 (H) 03/03/2020     03/03/2020    K 3.9 03/03/2020     03/03/2020    CO2 25.0 03/03/2020    CALCIUM 8.7 03/03/2020    PROTEINTOT 6.4 03/02/2020    ALBUMIN 3.50 03/02/2020    BILITOT 0.6 03/02/2020    ALKPHOS 67 03/02/2020    AST 16 03/02/2020    ALT 15 03/02/2020                   ASSESSMENT & PLAN:    1. Collecting duct carcinoma  2. Renal insufficiency  3. Malignant pleural effusions  4.  increasing liver metastases despite Keytruda and axitinib    Discussion: This is acting more like a collecting duct carcinoma than just an atypical renal cell non-clear cell carcinoma.  We will try carboplatin and Gemzar and try to get that approved but first with his shortness of breath we will get a stat CT of the chest.  Along with a rapid influenza and see him back in a day to go over the results of that.  If the flu test is negative we will send coronavirus testing tomorrow and noncontrast CT of the chest can  have suggestive changes that are fairly classic for corona if we find that.  Would want this sorted out before I think of starting with carboplatin and Gemzar.  He has no port but I would try to do this without a port initially.  He understands the palliative nature of what ever going to do.  Discussed with patient 45 minutes face-to-face greater than 50% spent counseling.  I also did a flu swab myself on him to avoid staph exposure.      Jose Coffey MD    03/12/2020

## 2020-03-17 NOTE — SIGNIFICANT NOTE
PT's cough and SOA is not new. PT tested for FLU and CT Chest last week.     03/17/20 1546   Outbreak Screening   Do you currently have the following symptoms? (!) No;Shortness of breath;Cough   In the last 14 days before you began feeling sick, have you traveled to the following country?   No   In the last 14 days, have you had contact with anyone known to have the 2019 Novel Coronavirus or anyone being tested for the 2019 Novel Coronavirus? No

## 2020-03-18 NOTE — PROGRESS NOTES
Onc Nutrition     Patient:  Alphonso Evans  YOB: 1965    Diagnosis: collecting duct carcinoma of the kidney  Treatment:  Carbo(D1) / Gemzar(D1,8) - every 21 days (start date 3/20/20)    Weight: 186#(3/12/20) / ~20# (10%) weight loss x ~2 months  Nutrition Symptoms:  Will assess at time of consult    Attempted to reach patient via phone call today for nutritional reassessment / education, however he did not answer.  Detailed voice mail message provided stating the nature of the phone call and RD's contact information with instructions to call back at his convenience.  Will continue to try and reach patient.  RD available to assist prn.    Raissa Saenz RD  03/18/20

## 2020-03-19 NOTE — PROGRESS NOTES
Onc Nutrition     Patient:  Alphonso Evans  YOB: 1965    Diagnosis: collecting duct carcinoma of the kidney  Treatment:  Carbo(D1) / Gemzar(D1,8) - every 21 days (start date 3/20/20)    Weight: 186# (3/12/20) / ~20# (10%) unintentional weight loss x ~2 months / severe chronic disease related weight loss  Nutrition Symptoms: none    Spoke with patient via phone call for nutritional assessment / education.  Note patient is starting on a new treatment plan due to progressive disease.  Patient reports his appetite has been good and he has been eating normally (3 meals per day plus occasional bedtime snack).  He denies nutritional complaints at this time.    Reviewed the importance of good nutrition during his treatment course.  Recommended he add snacks in-between his meals to aid with calorie / protein intake and potential nausea management.  Emphasized the importance of protein in the diet; reviewed high protein foods; and recommended he have a protein source at each meal / snack.  Also emphasized the importance of hydration and advised him to continue sipping on Gatorade and water throughout the day with a goal of at least 64 ounces daily.    Answered his questions and he voiced understanding of information discussed.  Will provide written diet materials at upcoming infusion appointment to reinforce information discussed.  Encouraged him to call RD with questions.  Will monitor as needed during his treatment course.    Raissa Saenz RD  03/19/20

## 2020-03-19 NOTE — TELEPHONE ENCOUNTER
Patient's sister, Felecia Sandoval, calling requesting patient being placed on a low dose steroid. Patient felt much better while taking. Increased ability to breath and activity level. Advised Dr. Coffey is out this afternoon and patient has an appointment in the morning. She agreed they could discuss medications at the visit tomorrow.

## 2020-03-19 NOTE — PLAN OF CARE
Outpatient Infusion • [address] • [address] • [place] [id number] • [phone]      CHEMOTHERAPY EDUCATION SHEET    NAME:  Alphonso Evans      : 1965           DATE: 20    Booklets Given: Chemotherapy and You []  Eating Hints []    Sexuality/Fertility Books []     Chemotherapy/Biotherapy Education Sheets: (list all that apply)  Will give sheets for carboplatin and gemcitabine tomorrow during infusion appt                                                                                                                                                                Chemotherapy Regimen:  Carboplatin Day 1 and gemcitabine Days 1 and 8 every 21 days    TOPICS EDUCATION PROVIDED EDUCATION REINFORCED COMMENTS   ANEMIA:  role of RBC, cause, s/s, ways to manage, role of transfusion [x] [] Discussed risk of anemia and encouraged to stay active.   THROMBOCYTOPENIA:  role of platelet, cause, s/s, ways to prevent bleeding, things to avoid, when to seek help [x] [] Discussed risk of bruising and bleeding.   NEUTROPENIA:  role of WBC, cause, infection precautions, s/s of infection, when to call MD [x] [] Discussed risk of infection and when to notify MD office; disucssed importance of hand washing.  Discussed when to call with a fever.   NUTRITION & APPETITE CHANGES:  importance of maintaining healthy diet & weight, ways to manage to improve intake, dietary consult, exercise regimen [x] [] Discussed risk of decreased appetite.  Discussed importance of hydration.   DIARRHEA:  causes, s/s of dehydration, ways to manage, dietary changes, when to call MD [x] [] Recommended OTC Immodium if needed.   CONSTIPATION:  causes, ways to manage, dietary changes, when to call MD [x] [] Recommended stool softner and Miralax if needed. Patient is on Percocet - discussed constipation risk.   NAUSEA & VOMITING:  cause, use of antiemetics, dietary changes, when to call MD [x] [] Discussed pre-meds and at home meds-when  and how to take. Patient took dex a few weeks ago since he thought he was supposed to go ahead and take it.  He is getting a refill and will start Saturday - discuss instructions in detail.   MOUTH SORES:  causes, oral care, ways to manage [x] [] Discussed preventive mouthwash with salt/baking soda/water and to notify MD office if sores develop.  Discussed using a soft brissle toothbrush and flossing.   ALOPECIA:  cause, ways to manage, resources [x] []    INFERTILITY & SEXUALITY:  causes, fertility preservation options, sexuality changes, ways to manage, importance of birth control [x] [] Discussed safe sex practices.   NERVOUS SYSTEM CHANGES:  causes, s/s, neuropathies, cognitive changes, ways to manage [x] [] Discussed risk of peripheral neuropathy.   PAIN:  causes, ways to manage [] [] ????   SKIN & NAIL CHANGES:  cause, s/s, ways to manage [x] [] Discussed risk of rash.   ORGAN TOXICITIES:  cause, s/s, need for diagnostic tests, labs, when to notify MD [x] [] Discussed labs to monitor.   SURVIVORSHIP:  distress, distress assessment, secondary malignancies, early/late effects, follow-up, social issues, social support [] []    HOME CARE:  use of spill kits, storing of PO chemo, how to manage bodily fluids [x] [] Discussed how to manage all ADRs at home and when to notify MD office.   MISCELLANEOUS:  drug interactions, administration, vesicant, et [x] [] Discussed frequency of infusions and length of treatment.     Referrals:        Notes:   Discussed aforementioned material with patient over the phone. All questions and concerns addressed. Provided patient with my contact information and instructions to call should additional questions arise. Will obtained signed consent tomorrow during infusion appt. Will provided patient with personalized treatment calendar.      Education provided via telephone in an effort to practice spatial distancing and reduce transmission risk of COVID-19 for both patients and employees  during the COVID-19 pandemic.

## 2020-03-20 NOTE — PROGRESS NOTES
CHIEF COMPLAINT: Metastatic kidney cancer progressing on Keytruda and axitinib status post CyberKnife to L2 with increasing liver metastases    Problem List:  Oncology/Hematology History    1. Kidney cancer  2. Biopsy-proven liver metastases  3. Radiographic evidence of bone metastases and lung metastases  4. Probable adrenal metastases    Oncology timeline:  -12/18/2019 presented to emergency room with back pain.  Started while riding horses 12/11/2019 but began suddenly.  Went to chiropractor with no results.  Went to  emergency room on 12/14/2018 due to the pain radiating down into his lower extremities and they gave him steroids and a muscle relaxer.  12/18/2019 MRI emergency room showed L4 osseous tumor with extension into the paraspinal soft tissues as well as posterior extradural spinal canal with severe compression of the thecal sac at L2.  CTs of chest abdomen pelvis revealed mediastinal adenopathy, multiple lung nodules concerning for metastasis, and likely rib metastases.  There are abnormalities in the liver consistent with metastasis and a 6.3 cm left renal mass likely the primary as well as a left adrenal mass and involvement of the right adrenal gland.  MRI of lumbar and thoracic spine revealed multiple degenerative changes.  Saw Dr. Rizo who did not recommend surgery and they recommended CyberKnife radiation for which Dr. Madrigal evaluated 12/18/2019.  -12/20/2019 CT-guided liver mass biopsy suggestive of atypical non-clear-cell renal cell carcinoma sent to [facility] for further consultation.  Favor white count of 15,000, CBC and CMP unremarkable.  PSA normal at 2.69.  Per Dr. Pruitt this could be a collecting duct carcinoma or another rare variant of a kidney cancer.    If this is a collecting duct carcinoma, this would be a very aggressive subtype of renal cell carcinoma.  Patients with a fumarate hydratase genomic alteration might benefit from M tor inhibitors particularly if there is in  an NF2 alteration.  NDR1Q47 suggests cisplatin resistance.  CDKN2A is seen in about 62% of these.  Prospective trials are limited in number and number of patients but older data suggests benefit of cisplatin gemcitabine while newer data casts doubt on such.  There are also conflicting reports of response and lack thereof to targeted therapy such as tyrosine kinase inhibitors with sorafenib and sunitinib and Cabozantanib having variable responses between 30 to 50%.  PDL 1 expression is highly variable and immunotherapy while not universally helpful may have a role.  There is a clinical trial looking at gemcitabine cisplatin plus up Avastin (GETA UG-AF u24) as well as cabozantinib (Bonsai trial).  In the meantime, I will prepare for high risk kidney cancer standard therapy with Keytruda axitinib while waiting on the above molecular testing and data [facility].  We will get an MRI of his brain as well as he has some dizziness and we need completion staging.  I have contacted radiation oncology to have them follow-up for spine radiation as his right leg is getting more numb.  We will also get in with our palliative care clinic for pain management.  We will also get an MRI of his neck with numbness now happening in his left arm.    -1/6/2020 MRI of the brain shows at least 2 sites of right frontotemporal involvement of abnormal enhancement concerning for metastatic disease.  MRI of the cervical spine negative other than for minimal degenerative changes.  However, per Dr. Grimaldo who had communication with Dr. Rizo, Dr. Rizo was not convinced of these being metastatic.    -1/13/2020: Started Keytruda axitinib    -2/10/2020 CT PE protocol showed no evidence of pulmonary embolus.  There was hilar and mediastinal adenopathy with reticulonodular bilateral pulmonary changes.  Ill-defined 5.6 cm cystic mass upper pole left kidney.  Ill-defined liver lesions largest 2.2 cm consistent with  metastasis.    -2/20/2020 MRI brain showed small right-sided hypoenhancing lesions nonspecific and unchanged from 1/6/2020 with no new disease.  Status post CyberKnife to L2 by Dr. Rizo    -3/4/2020 CT chest abdomen pelvis showed mild increased liver metastases and L2 with a stable left renal mass and retroperitoneal lymphadenopathy stable.      Total body bone scan show sternal, lower lumbar, sacral, and pelvic metastases.    MRI brain limited due to lack of contrast but no clear-cut metastasis    Pleural fluid cytology positive for renal cell carcinoma.        Lumbar spine tumor    12/18/2019 Initial Diagnosis     Lumbar spine tumor      1/9/2020 - 1/9/2020 Radiation     Radiation OncologyTreatment Course:  Alphonso Evans received 1400 cGy in 1 fractions to L-spine metastatsis via Stereotactic Radiation Therapy - SRT.        Renal cancer, left (CMS/HCC)    12/22/2019 Initial Diagnosis     Renal cancer, left (CMS/HCC)      12/30/2019 Cancer Staged     Staging form: Kidney, AJCC 8th Edition  - Clinical: Stage IV (cT1b, cN0, pM1) - Signed by Jose Coffey MD on 12/30/2019 1/6/2020 Imaging     MRI of the brain IMPRESSION:  At least 2 sites of right frontotemporal involvement of  abnormal enhancement on postcontrast sequences concerning for  intracranial metastasis as these are not well identified on precontrast  T1 sequences with attention on followup. Mild chronic small vessel  ischemic disease without acute intracranial findings otherwise noted.  However, per discussion with Dr. Grimaldo, the images were reviewed by Dr. Rizo who is not convinced of these being malignant metastases.  MRI cervical spine IMPRESSION:  Minimal degenerative changes with no significant disc  protrusion, spinal stenosis or abnormal cord finding.      1/13/2020 - 3/15/2020 Chemotherapy     OP KIDNEY Axitinib / Pembrolizumab      2/10/2020 Imaging     CT PE protocol showed no evidence of pulmonary embolus.  There was hilar and  mediastinal adenopathy with reticulonodular bilateral pulmonary changes.  Ill-defined 5.6 cm cystic mass upper pole left kidney.  Ill-defined liver lesions largest 2.2 cm consistent with metastasis.      2/20/2020 Imaging     MRI brain :Small right-sided hypoenhancing lesions, nonspecific in  appearance but unchanged from previous 01/06/2020 exam. No new or  increasing intracranial abnormality to suggest metastatic disease      3/2/2020 Imaging     CT chest without contrast showed bilateral pleural effusions with diffuse interstitial changes.      3/4/2020 Progression     CT chest abdomen pelvis showed mild increased liver metastases and L2 with a stable left renal mass and retroperitoneal lymphadenopathy stable.      Total body bone scan show sternal, lower lumbar, sacral, and pelvic metastases.    MRI brain limited due to lack of contrast but no clear-cut metastasis    Pleural fluid cytology positive for renal cell carcinoma      3/20/2020 -  Chemotherapy     OP BLADDER CARBOplatin / Gemcitabine        Collecting duct carcinoma (CMS/HCC)    1/13/2020 - 3/15/2020 Chemotherapy     OP KIDNEY Axitinib / Pembrolizumab      3/12/2020 Initial Diagnosis     Collecting duct carcinoma (CMS/HCC)      3/20/2020 -  Chemotherapy     OP BLADDER CARBOplatin / Gemcitabine         HISTORY OF PRESENT ILLNESS:  The patient is a 54 y.o. 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  Feels great on steroids but should only take them around the time of treatment and he apparently misunderstood that.      Past Medical History:   Diagnosis Date   • Cancer (CMS/HCC)     Renal, Spine, Brain   • Hypertension      Past Surgical History:   Procedure Laterality Date   • CYBERKNIFE Right 01/09/2020    Right paraspinal lesion behind the L2 vertebral body- Dr. Michael Rizo    • KNEE SURGERY Left     x 3   • ROTATOR CUFF REPAIR Right 2014       No Known Allergies    Family History and Social History  "reviewed and changed as necessary      REVIEW OF SYSTEM:   Review of Systems   Constitutional: Negative for appetite change, chills, diaphoresis, fatigue, fever and unexpected weight change.   HENT:   Negative for mouth sores, sore throat and trouble swallowing.    Eyes: Negative for icterus.   Respiratory: Negative for cough, hemoptysis and shortness of breath.    Cardiovascular: Negative for chest pain, leg swelling and palpitations.   Gastrointestinal: Negative for abdominal distention, abdominal pain, blood in stool, constipation, diarrhea, nausea and vomiting.   Endocrine: Negative for hot flashes.   Genitourinary: Negative for bladder incontinence, difficulty urinating, dysuria, frequency and hematuria.    Musculoskeletal: Negative for gait problem, neck pain and neck stiffness.   Skin: Negative for rash.   Neurological: Negative for dizziness, gait problem, headaches, light-headedness and numbness.   Hematological: Negative for adenopathy. Does not bruise/bleed easily.   Psychiatric/Behavioral: Negative for depression. The patient is not nervous/anxious.    All other systems reviewed and are negative.       PHYSICAL EXAM    Vitals:    03/20/20 1038   BP: 125/93   Pulse: (!) 130   Resp: 20   Temp: 97.4 °F (36.3 °C)   SpO2: 95%   Weight: 83.5 kg (184 lb)   Height: 167.6 cm (66\")     Vitals:    03/20/20 1038   PainSc: 0-No pain  Comment: +SOA seems to be getting worse        Constitutional: Appears well-developed and well-nourished. No distress.   ECOG: (1) Restricted in Physically Strenuous Activity, Ambulatory & Able to Do Work of Light Nature  HENT:   Head: Normocephalic.   Mouth/Throat: Oropharynx is clear and moist.   Eyes: Conjunctivae are normal. Pupils are equal, round, and reactive to light. No scleral icterus.   Neck: Neck supple. No JVD present. No thyromegaly present.   Cardiovascular: .  Asymptomatic resting tachycardia with regular rhythm on steroids.  Pulmonary/Chest: Breath sounds normal. No " respiratory distress.   Abdominal: Soft. Exhibits no distension and no mass. There is no hepatosplenomegaly. There is no tenderness. There is no rebound and no guarding.   Musculoskeletal:Exhibits no edema, tenderness or deformity.   Neurological: Alert and oriented to person, place, and time. Exhibits normal muscle tone.   Skin: No ecchymosis, no petechiae and no rash noted. Not diaphoretic. No cyanosis. Nails show no clubbing.   Psychiatric: Normal mood and affect.   Vitals reviewed.      Lab Results   Component Value Date    HGB 14.9 03/03/2020    HCT 44.0 03/03/2020    MCV 86.8 03/03/2020     03/03/2020    WBC 5.79 03/03/2020    NEUTROABS 3.69 03/03/2020    LYMPHSABS 1.28 03/03/2020    MONOSABS 0.61 03/03/2020    EOSABS 0.12 03/03/2020    BASOSABS 0.04 03/03/2020       Lab Results   Component Value Date    GLUCOSE 92 03/03/2020    BUN 21 (H) 03/03/2020    CREATININE 1.69 (H) 03/03/2020     03/03/2020    K 3.9 03/03/2020     03/03/2020    CO2 25.0 03/03/2020    CALCIUM 8.7 03/03/2020    PROTEINTOT 6.4 03/02/2020    ALBUMIN 3.50 03/02/2020    BILITOT 0.6 03/02/2020    ALKPHOS 67 03/02/2020    AST 16 03/02/2020    ALT 15 03/02/2020                   ASSESSMENT & PLAN:  1. Collecting duct carcinoma  2. Renal insufficiency  3. Malignant pleural effusions  4.  increasing liver metastases despite Keytruda and axitinib    Discussion: 3/12/2020 CT chest showed actual decrease in the volume in the right and left pleural effusions but with extensive bilateral intralobular septal thickening and irregular diffuse bilateral pulmonary nodularity similar to his prior metastases with increasing perinephric edema within the left kidney and left adrenal mass with a known left superior pole soft tissue mass increasing in size just in a 10-day period.  He has had no further fevers and his flu test was negative.  No changes in his chronic cough to suggest Covid 19 and no increasing shortness of breath.  We will  given carboplatin and Gemzar peripherally without port.  We will see us back at the beginning of each course of therapy, perhaps seeing him in the treatment area.  Discussed with patient face-to-face 30 minutes greater than 50% spent counseling regarding this complex plan as outlined above.  He knows to shelter in place during Covid 19 pandemic.  He will only get out to come in for treatment.  He understands the added risk.        Jose Coffey MD    03/20/2020

## 2020-04-05 PROBLEM — J96.02 ACUTE RESPIRATORY FAILURE WITH HYPOXIA AND HYPERCAPNIA (HCC): Status: ACTIVE | Noted: 2020-01-01

## 2020-04-05 PROBLEM — J96.01 ACUTE HYPOXEMIC RESPIRATORY FAILURE (HCC): Status: ACTIVE | Noted: 2020-01-01

## 2020-04-05 PROBLEM — J91.0 MALIGNANT PLEURAL EFFUSION: Status: ACTIVE | Noted: 2020-01-01

## 2020-04-05 PROBLEM — J96.01 ACUTE RESPIRATORY FAILURE WITH HYPOXIA (HCC): Status: ACTIVE | Noted: 2020-01-01

## 2020-04-05 PROBLEM — N18.30 CKD (CHRONIC KIDNEY DISEASE) STAGE 3, GFR 30-59 ML/MIN (HCC): Status: ACTIVE | Noted: 2020-01-01

## 2020-04-05 NOTE — ED PROVIDER NOTES
Subjective   Alphonso Evans is a 54 y.o.male who presents to the ED with complaints of a neurologic problem. His last known time well is midnight last night. When he woke up this morning, the patient was experiencing confusion, generalized weakness, and myalgias. He has not taken any medication for his symptoms. He also complains of a headache, generalized abdominal pain, and diarrhea. He denies any chest pain, nausea, vomiting, or cough. Additionally, he has a history of kidney cancer with metastases to the liver and lung. He is followed by Dr. Coffey for this. He is undergoing chemotherapy treatment, as his most recent appointment was nine days ago. He denies experiencing any symptoms similar to his current discomfort since undegoing chemotherapy. There are no other complaints at this time.       History provided by:  Patient  Neurologic Problem   The patient's primary symptoms include an altered mental status and weakness (generalized). This is a new problem. The current episode started today. The neurological problem developed suddenly. The last time the patient was known to be well was 4/5/2020 12:00 AM.  The problem is unchanged. There was no focality noted. Associated symptoms include abdominal pain, confusion and headaches. Pertinent negatives include no chest pain, nausea or vomiting. Past treatments include nothing. The treatment provided no relief.       Review of Systems   Respiratory: Negative for cough.    Cardiovascular: Negative for chest pain.   Gastrointestinal: Positive for abdominal pain and diarrhea. Negative for nausea and vomiting.   Musculoskeletal: Positive for myalgias.   Neurological: Positive for weakness (generalized) and headaches.   Psychiatric/Behavioral: Positive for confusion.   All other systems reviewed and are negative.      Past Medical History:   Diagnosis Date   • Cancer (CMS/HCC)     Renal, Spine, Brain   • Hypertension        No Known Allergies    Past Surgical History:    Procedure Laterality Date   • CYBERKNIFE Right 01/09/2020    Right paraspinal lesion behind the L2 vertebral body- Dr. Michael Rizo    • KNEE SURGERY Left     x 3   • ROTATOR CUFF REPAIR Right 2014       Family History   Problem Relation Age of Onset   • Cancer Father    • Heart disease Father        Social History     Socioeconomic History   • Marital status:      Spouse name: Not on file   • Number of children: Not on file   • Years of education: Not on file   • Highest education level: Not on file   Tobacco Use   • Smoking status: Never Smoker   • Smokeless tobacco: Never Used   Substance and Sexual Activity   • Alcohol use: Never     Frequency: Never   • Drug use: Never   • Sexual activity: Defer   Social History Narrative    Lives in [place]. 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          Objective   Physical Exam   Constitutional: He is oriented to person, place, and time. He appears well-developed and well-nourished.   HENT:   Head: Normocephalic and atraumatic.   Eyes: Conjunctivae are normal. No scleral icterus.   Neck: Normal range of motion. Neck supple.   Cardiovascular: Normal rate, regular rhythm, normal heart sounds and intact distal pulses.   No murmur heard.  Pulmonary/Chest: Effort normal and breath sounds normal. No respiratory distress.   Abdominal: Soft. Bowel sounds are normal. There is no tenderness.   Musculoskeletal: Normal range of motion. He exhibits no edema.   Neurological: He is alert and oriented to person, place, and time.   Moves all extremities equally and at full strength.    Skin: Skin is warm and dry.   Psychiatric: He has a normal mood and affect. His behavior is normal.   Nursing note and vitals reviewed.      Critical Care  Performed by: Abdulaziz Sánchez MD  Authorized by: Abdulaziz Sánchez MD     Critical care provider statement:     Critical care time (minutes):  35    Critical care time was exclusive of:  Separately billable procedures  and treating other patients    Critical care was necessary to treat or prevent imminent or life-threatening deterioration of the following conditions:  CNS failure or compromise    Critical care was time spent personally by me on the following activities:  Ordering and performing treatments and interventions, ordering and review of laboratory studies, ordering and review of radiographic studies, pulse oximetry, re-evaluation of patient's condition, review of old charts, development of treatment plan with patient or surrogate, discussions with consultants, evaluation of patient's response to treatment, examination of patient and obtaining history from patient or surrogate    I assumed direction of critical care for this patient from another provider in my specialty: no               ED Course  ED Course as of Apr 05 1604   Sun Apr 05, 2020   1332 Patient is seen emergently in the CT scanner for stroke evaluation and stroke alert.  Initial examination was on the CT scanner table.  He is confused but had no focal neurologic deficit at that time.Patient sats dropped.  He is reevaluated and has moderate expiratory wheezing.  His temp is 99.2.  He has diminished breath sounds bilaterally and now examined not in the CT scanner but in the room while sitting upright, in a much better examinationCT of the chest without contrast is obtained.  Final radiologic interpretation is pending but findings are concerning.Laboratory evaluation is ongoing.  Troponin is negative.  Patient has renal insufficiency, but has had elevated creatinine in the past.  His lactate is 2.1 ammonia is normal.    [HH]   1428 Contacted Dr. Clemente, radiology who feels that the CT looks like lymphangitic spread, not novel coronavirus, or at least not high or moderate probability notable coronavirus.  Sats are now in the upper 90s on 10 L O2.  BNP is elevated.I will culture the patient and treat with antibiotics as there are possible infiltrates as well.   Of discussed this with pulmonary.    [HH]   1422 I discussed the findings with the patient and a plan admission.  I have discussed his living well.  He reports that he does not want to be intubated or placed on a ventilator.    [HH]   1423   If he has declined from his cancer he does not want resuscitation.He agrees with plan admission.  We will treat him with pulmonary medications    [HH]   1456 I discussed findings with Dr. Vu who will admit for definitive inpatient care.  We reviewed my discussion with the patient with instructions for no intubation, no ventilation.  He additionally stated if he has cardiac arrest due to progression of his cancer no resuscitation    [HH]      ED Course User Index  [] Abdulaziz Sánchez MD     Recent Results (from the past 24 hour(s))   POC Protime / INR    Collection Time: 04/05/20 11:59 AM   Result Value Ref Range    Protime 14.3 12.8 - 15.2 seconds    INR 1.2 0.8 - 1.2   POC Creatinine    Collection Time: 04/05/20 11:59 AM   Result Value Ref Range    Creatinine 2.00 (H) 0.60 - 1.30 mg/dL   POC Surgery Labs    Collection Time: 04/05/20 12:00 PM   Result Value Ref Range    Ionized Calcium 1.20 1.20 - 1.32 mmol/L    POC Potassium 4.6 3.5 - 4.9 mmol/L    Sodium 134 (L) 138 - 146 mmol/L    Total CO2 31 (H) 24 - 29 mmol/L    Hemoglobin 11.9 (L) 12.0 - 17.0 g/dL    Hematocrit 35 (L) 38 - 51 %    pCO2, Arterial 61.8 (H) 35 - 45 mm Hg    pO2, Arterial 15 (L) 80 - 105 mmHg    Base Excess 2.0000 -5 - 5 mmol/L    O2 Saturation, Arterial 16 (L) 95 - 98 %    pH, Arterial 7.28 (L) 7.35 - 7.6 pH units    HCO3, Arterial 28.9 (H) 22 - 26 mmol/L   aPTT    Collection Time: 04/05/20 12:10 PM   Result Value Ref Range    PTT 25.6 24.0 - 37.0 seconds   Troponin    Collection Time: 04/05/20 12:10 PM   Result Value Ref Range    Troponin T <0.010 0.000 - 0.030 ng/mL   Comprehensive Metabolic Panel    Collection Time: 04/05/20 12:10 PM   Result Value Ref Range    Glucose 109 (H) 65 - 99 mg/dL    BUN  42 (H) 6 - 20 mg/dL    Creatinine 1.86 (H) 0.76 - 1.27 mg/dL    Sodium 137 136 - 145 mmol/L    Potassium 4.7 3.5 - 5.2 mmol/L    Chloride 95 (L) 98 - 107 mmol/L    CO2 28.0 22.0 - 29.0 mmol/L    Calcium 8.7 8.6 - 10.5 mg/dL    Total Protein 6.8 6.0 - 8.5 g/dL    Albumin 3.70 3.50 - 5.20 g/dL    ALT (SGPT) 44 (H) 1 - 41 U/L    AST (SGOT) 23 1 - 40 U/L    Alkaline Phosphatase 80 39 - 117 U/L    Total Bilirubin 0.4 0.2 - 1.2 mg/dL    eGFR Non African Amer 38 (L) >60 mL/min/1.73    Globulin 3.1 gm/dL    A/G Ratio 1.2 g/dL    BUN/Creatinine Ratio 22.6 7.0 - 25.0    Anion Gap 14.0 5.0 - 15.0 mmol/L   Ammonia    Collection Time: 04/05/20 12:10 PM   Result Value Ref Range    Ammonia 32 16 - 60 umol/L   Magnesium    Collection Time: 04/05/20 12:10 PM   Result Value Ref Range    Magnesium 2.4 1.6 - 2.6 mg/dL   Light Blue Top    Collection Time: 04/05/20 12:10 PM   Result Value Ref Range    Extra Tube hold for add-on    Green Top (Gel)    Collection Time: 04/05/20 12:10 PM   Result Value Ref Range    Extra Tube Hold for add-ons.    Lavender Top    Collection Time: 04/05/20 12:10 PM   Result Value Ref Range    Extra Tube hold for add-on    Gold Top - SST    Collection Time: 04/05/20 12:10 PM   Result Value Ref Range    Extra Tube Hold for add-ons.    CBC Auto Differential    Collection Time: 04/05/20 12:10 PM   Result Value Ref Range    WBC 2.68 (L) 3.40 - 10.80 10*3/mm3    RBC 4.23 4.14 - 5.80 10*6/mm3    Hemoglobin 12.2 (L) 13.0 - 17.7 g/dL    Hematocrit 37.2 (L) 37.5 - 51.0 %    MCV 87.9 79.0 - 97.0 fL    MCH 28.8 26.6 - 33.0 pg    MCHC 32.8 31.5 - 35.7 g/dL    RDW 13.8 12.3 - 15.4 %    RDW-SD 44.0 37.0 - 54.0 fl    MPV 9.8 6.0 - 12.0 fL    Platelets 113 (L) 140 - 450 10*3/mm3    Neutrophil % 50.4 42.7 - 76.0 %    Lymphocyte % 37.3 19.6 - 45.3 %    Monocyte % 9.0 5.0 - 12.0 %    Eosinophil % 0.7 0.3 - 6.2 %    Basophil % 0.7 0.0 - 1.5 %    Immature Grans % 1.9 (H) 0.0 - 0.5 %    Neutrophils, Absolute 1.35 (L) 1.70 - 7.00  10*3/mm3    Lymphocytes, Absolute 1.00 0.70 - 3.10 10*3/mm3    Monocytes, Absolute 0.24 0.10 - 0.90 10*3/mm3    Eosinophils, Absolute 0.02 0.00 - 0.40 10*3/mm3    Basophils, Absolute 0.02 0.00 - 0.20 10*3/mm3    Immature Grans, Absolute 0.05 0.00 - 0.05 10*3/mm3    nRBC 0.7 (H) 0.0 - 0.2 /100 WBC   C-reactive Protein    Collection Time: 04/05/20 12:10 PM   Result Value Ref Range    C-Reactive Protein 0.92 (H) 0.00 - 0.50 mg/dL   D-dimer, Quantitative    Collection Time: 04/05/20 12:10 PM   Result Value Ref Range    D-Dimer, Quantitative 2.61 (H) 0.00 - 0.56 MCGFEU/mL   Procalcitonin    Collection Time: 04/05/20 12:10 PM   Result Value Ref Range    Procalcitonin 0.41 (H) 0.10 - 0.25 ng/mL   Lactate Dehydrogenase    Collection Time: 04/05/20 12:10 PM   Result Value Ref Range     (H) 135 - 225 U/L   BNP    Collection Time: 04/05/20 12:10 PM   Result Value Ref Range    proBNP 3,078.0 (H) 5.0 - 900.0 pg/mL   Lactic Acid, Plasma    Collection Time: 04/05/20 12:13 PM   Result Value Ref Range    Lactate 2.1 (C) 0.5 - 2.0 mmol/L   Lactic Acid, Reflex Timer (This will reflex a repeat order 3-3:15 hours after ordered.)    Collection Time: 04/05/20 12:13 PM   Result Value Ref Range    Hold Tube Hold for add-ons.    Blood Gas, Arterial With Co-Ox    Collection Time: 04/05/20  2:45 PM   Result Value Ref Range    Site Right Radial     Andi's Test N/A     pH, Arterial 7.288 (L) 7.350 - 7.450 pH units    pCO2, Arterial 54.7 (H) 35.0 - 45.0 mm Hg    pO2, Arterial 164.0 (H) 83.0 - 108.0 mm Hg    HCO3, Arterial 26.1 (H) 20.0 - 26.0 mmol/L    Base Excess, Arterial -1.2 (L) 0.0 - 2.0 mmol/L    Hemoglobin, Blood Gas 11.4 (L) 13.5 - 17.5 g/dL    Hematocrit, Blood Gas 34.9 %    Oxyhemoglobin 96.9 94 - 99 %    Methemoglobin 1.30 0.00 - 1.50 %    Carboxyhemoglobin 1.1 0 - 2 %    CO2 Content 27.8 22 - 33 mmol/L    Temperature 37.0 C    Barometric Pressure for Blood Gas      Modality NRB     FIO2 60 %    Ventilator Mode       Note       pH, Temp Corrected 7.288 pH Units    pCO2, Temperature Corrected 54.7 (H) 35 - 48 mm Hg    pO2, Temperature Corrected 164 (H) 83 - 108 mm Hg   Urinalysis With Culture If Indicated - Urine, Clean Catch    Collection Time: 04/05/20  3:27 PM   Result Value Ref Range    Color, UA Yellow Yellow, Straw    Appearance, UA Clear Clear    pH, UA <=5.0 5.0 - 8.0    Specific Gravity, UA 1.026 1.001 - 1.030    Glucose, UA Negative Negative    Ketones, UA Negative Negative    Bilirubin, UA Negative Negative    Blood, UA Negative Negative    Protein, UA 30 mg/dL (1+) (A) Negative    Leuk Esterase, UA Negative Negative    Nitrite, UA Negative Negative    Urobilinogen, UA 1.0 E.U./dL 0.2 - 1.0 E.U./dL   Urinalysis, Microscopic Only - Urine, Clean Catch    Collection Time: 04/05/20  3:27 PM   Result Value Ref Range    RBC, UA 0-2 None Seen, 0-2 /HPF    WBC, UA 0-2 None Seen, 0-2 /HPF    Bacteria, UA None Seen None Seen, Trace /HPF    Squamous Epithelial Cells, UA 0-2 None Seen, 0-2 /HPF    Hyaline Casts, UA 13-20 0 - 6 /LPF    Methodology Automated Microscopy      Note: In addition to lab results from this visit, the labs listed above may include labs taken at another facility or during a different encounter within the last 24 hours. Please correlate lab times with ED admission and discharge times for further clarification of the services performed during this visit.    CT Chest Without Contrast   Preliminary Result   Bilateral pleural effusions are moderate to large in size,   left greater than right, with significant increase seen in the   lymphangitic spread of disease,  now with consolidation seen in the   perihilar regions bilaterally with multiple fibronodular densities   identified. The examination is atypical in appearance for COVID-19   pneumonia. Findings most suggestive again of lymphangitic spread of   disease. A superimposed central infiltrate cannot be completely   excluded, and clinical correlation is needed. There is  "extensive   metastatic disease identified within the upper abdomen.       DICTATED:   04/05/2020   EDITED/ls :   04/05/2020           XR Chest 1 View   Preliminary Result   Patchy airspace disease seen within the mid and lower lung   fields bilaterally with small bilateral pleural effusions. Findings have   worsened in the interval.       DICTATED:   04/05/2020   EDITED/ls :   04/05/2020           CT Head Without Contrast Stroke Protocol   Preliminary Result   Old insult to the right basal ganglia, small in size with no   acute intracranial abnormality identified.       Examination was performed 04/05/2020 at 11:46 AM and examination results   were given to the ER physician at the scanner by Dr. Lindsay on 04/05/2020   at 11:48 AM.       DICTATED:   04/05/2020   EDITED/ls :   04/05/2020                 Vitals:    04/05/20 1215 04/05/20 1237 04/05/20 1449 04/05/20 1545   BP:    120/67   BP Location:    Right arm   Patient Position:    Lying   Pulse: 94  93 99   Resp: 18  20 20   Temp:  99.2 °F (37.3 °C)     TempSrc:  Oral     SpO2: 99%  98% 97%   Weight:  86.2 kg (190 lb)     Height:  170.2 cm (67\")       Medications   sodium chloride 0.9 % flush 10 mL (has no administration in time range)   sodium chloride 0.9 % bolus 1,000 mL (0 mL Intravenous Stopped 4/5/20 1551)   sodium chloride 0.9 % infusion (125 mL/hr Intravenous New Bag 4/5/20 1552)   albuterol sulfate HFA (PROVENTIL HFA;VENTOLIN HFA;PROAIR HFA) inhaler 6 puff (has no administration in time range)   furosemide (LASIX) injection 40 mg (has no administration in time range)   piperacillin-tazobactam (ZOSYN) 4.5 g in iso-osmotic dextrose 100 mL IVPB (premix) (0 g Intravenous Stopped 4/5/20 1527)   vancomycin 1750 mg/500 mL 0.9% NS IVPB ([facility]) (0 mg/kg × 86.2 kg Intravenous Stopped 4/5/20 1550)     ECG/EMG Results (last 24 hours)     ** No results found for the last 24 hours. **        ECG 12 Lead   Final Result   Test Reason : Acute Stroke Protocol (onset < 12 hrs) "   Blood Pressure : **/** mmHG   Vent. Rate : 093 BPM     Atrial Rate : 093 BPM      P-R Int : 136 ms          QRS Dur : 092 ms       QT Int : 348 ms       P-R-T Axes : 033 017 031 degrees      QTc Int : 432 ms      Normal sinus rhythm   When compared with ECG of 02-MAR-2020 19:00,   No significant change was found   Confirmed by NAVDEEP SÁNCHEZ MD ([id number]) on 4/5/2020 12:42:58 PM      Referred By:  RL           Confirmed By:NAVDEEP SÁNCHEZ MD                                                 MDM  Number of Diagnoses or Management Options     Amount and/or Complexity of Data Reviewed  Decide to obtain previous medical records or to obtain history from someone other than the patient: yes    Critical Care  Total time providing critical care: 30-74 minutes      Final diagnoses:   Confusion   Shortness of breath   Hypoxia   Metastatic cancer (CMS/HCC)   Acidosis   Renal insufficiency   Encntr for obs for susp expsr to oth biolg agents ruled out       Documentation assistance provided by wilian Singh.  Information recorded by the scribe was done at my direction and has been verified and validated by me.     Renzo Singh  04/05/20 1159       Renzo Singh  04/05/20 1200       Renzo Singh  04/05/20 1242       Navdeep Sánchez MD  04/05/20 1500       Navdeep Sánchez MD  04/05/20 2004

## 2020-04-05 NOTE — PROGRESS NOTES
Patient remains very symptomatic after 4mg morphine.  RN states he is breathing 40 times a minute.  Plan to start morphine drip tonight.  I discussed with his sister and she is in agreement and plan comfort measures only at this point.  I will DC non-comfort based medications.    Electronically signed by Jose Vu MD, 04/05/20, 7:00 PM.

## 2020-04-05 NOTE — H&P
[facility] Medicine Services  HISTORY AND PHYSICAL    Patient Name: Alphonso Evans  : 1965  MRN: [id number]  Primary Care Physician: Juan Mccarty MD  Date of admission: 2020      Subjective   Subjective     Chief Complaint:  Shortness of breath    HPI:  Alphonso Evans is a 54 y.o. male with a history of metastatic renal cell carcinoma initially diagnosed in 2019.  Is found to be widely metastatic including bones, liver, recent lymphangitic spread in the lungs.  Followed by Dr. Coffey and started on Keytruda and axitinib in January.  Had disease progression and most recently started on carboplatin and Gemzar.  Presents to the emergency room with progressive shortness of breath.  Has no malignant pleural effusions and last tapped on 3/4.  Patient is currently a poor historian.  I did discuss with his sister who is a nurse practitioner who states that his breathing has gotten worse over the last few days.  Normally does not wear oxygen at baseline.  He denies any fevers.  Positive nonproductive cough.  He has no recent travel, mostly stays inside, has no known COVID exposures.  Work-up in the emergency room included a CT of his chest which showed recurrent left greater than right pleural effusions.  And progression of lymphangitic spread in the lungs.  ABG showed hypoxia and hypercarbia.  He was given 40 mg of IV Lasix.  He is being admitted for further evaluation.    Review of Systems   Gen- No fevers, chills  CV- No chest pain, palpitations  Resp- + cough, + dyspnea  GI- + decreased appetite  + LE edema    All other systems reviewed and are negative.     Personal History     Past Medical History:   Diagnosis Date   • Cancer (CMS/HCC)     Renal, Spine, Brain   • Hypertension        Past Surgical History:   Procedure Laterality Date   • CYBERKNIFE Right 2020    Right paraspinal lesion behind the L2 vertebral body- Dr. Michael Rizo    • KNEE SURGERY  Left     x 3   • ROTATOR CUFF REPAIR Right 2014       Family History: family history includes Cancer in his father; Heart disease in his father. Otherwise pertinent FHx was reviewed and unremarkable.     Social History:  reports that he has never smoked. He has never used smokeless tobacco. He reports that he does not drink alcohol or use drugs.  Social History     Social History Narrative    Lives in [place]. 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        Medications:  Available home medication information reviewed.    No Known Allergies    Objective   Objective     Vital Signs:   Temp:  [99.2 °F (37.3 °C)] 99.2 °F (37.3 °C)  Heart Rate:  [93-99] 99  Resp:  [18-20] 20  BP: (109-120)/(67-73) 120/67   Total (NIH Stroke Scale): 0    Physical Exam   Constitutional: Awake, alert, sitting up in bed, ill appearing  Eyes: PERRLA, sclerae anicteric, no conjunctival injection  HENT: NCAT, mucous membranes moist  Neck: Supple, no thyromegaly, no lymphadenopathy, trachea midline  Respiratory: diminished breath sounds at bases, tachypnic, on NRB, in moderate resp distress with accessory muscle use, unable to complete sentences  Cardiovascular: tachy, regular, no murmurs, rubs, or gallops  Gastrointestinal: Positive bowel sounds, soft, nontender, nondistended  Musculoskeletal: 1+ bilateral ankle edema, no clubbing or cyanosis to extremities  Psychiatric: Appropriate affect, cooperative  Neurologic: Mild confusion, but oriented, moves all 4, follows commands, no focal weakness  Skin: No rashes      Results Reviewed:  I have personally reviewed current lab and radiology data.    Results from last 7 days   Lab Units 04/05/20  1210  04/05/20  1159   WBC 10*3/mm3 2.68*  --   --    HEMOGLOBIN g/dL 12.2*  --   --    HEMOGLOBIN, POC   --    < >  --    HEMATOCRIT % 37.2*  --   --    HEMATOCRIT POC   --    < >  --    PLATELETS 10*3/mm3 113*  --   --    INR   --   --  1.2    < > = values in this interval not  displayed.     Results from last 7 days   Lab Units 04/05/20  1213 04/05/20  1210   SODIUM mmol/L  --  137   POTASSIUM mmol/L  --  4.7   CHLORIDE mmol/L  --  95*   CO2 mmol/L  --  28.0   BUN mg/dL  --  42*   CREATININE mg/dL  --  1.86*   GLUCOSE mg/dL  --  109*   CALCIUM mg/dL  --  8.7   ALT (SGPT) U/L  --  44*   AST (SGOT) U/L  --  23   TROPONIN T ng/mL  --  <0.010   PROBNP pg/mL  --  3,078.0*   LACTATE mmol/L 2.1*  --    PROCALCITONIN ng/mL  --  0.41*     Estimated Creatinine Clearance: 47 mL/min (A) (by C-G formula based on SCr of 1.86 mg/dL (H)).  Brief Urine Lab Results  (Last result in the past 365 days)      Color   Clarity   Blood   Leuk Est   Nitrite   Protein   CREAT   Urine HCG        04/05/20 1527 Yellow Clear Negative Negative Negative 30 mg/dL (1+)             Imaging Results (Last 24 Hours)     Procedure Component Value Units Date/Time    XR Chest 1 View [[id number]] Collected:  04/05/20 1313     Updated:  04/05/20 1420    Narrative:          EXAMINATION: XR CHEST, SINGLE VIEW - 04/05/2020     INDICATION: Shortness of air, difficulty breathing. Stroke protocol.     COMPARISON: 03/02/2020     FINDINGS: Portable chest reveals worsening of the patchy airspace  disease identified within the mid and lower lung fields bilaterally with  small bilateral pleural effusions which are slightly worsened in the  interval. The heart is borderline enlarged. Degenerative changes seen  within the spine. Upper lung fields again reveal increased markings  bilaterally. Degenerative changes seen within the spine.           Impression:       Patchy airspace disease seen within the mid and lower lung  fields bilaterally with small bilateral pleural effusions. Findings have  worsened in the interval.     DICTATED:   04/05/2020  EDITED/ls :   04/05/2020        CT Chest Without Contrast [[id number]] Collected:  04/05/20 1336     Updated:  04/05/20 1358    Narrative:       EXAMINATION: CT CHEST WO CONTRAST - 04/05/2020      INDICATION: Shortness of breath, generalized weakness, low O2 sats.     TECHNIQUE: Multiple axial CT imaging is obtained of the chest without  the administration of intravenous contrast.     The radiation dose reduction device was turned on for each scan per the  ALARA (As Low as Reasonably Achievable) protocol.     COMPARISON: 03/12/2020     FINDINGS: There are bilateral pleural effusions, left greater than right  and moderate to large in size. Multiple fibronodular densities seen  diffusely throughout the aerated portions of the lung fields with  consolidation seen in the perihilar regions bilaterally, all suggesting  lymphangitic spread of disease which is significantly worsened on  today's examination than when compared to the prior study. The  examination is atypical in appearance with uncommon reported features  for COVID-19 pneumonia. Alternative diagnoses should be considered such  as lymphangitic spread of disease. A superimposed pneumonia in the  perihilar regions cannot completely be excluded, however, clinical  correlation is needed. There is no mediastinal mass. Similar  lymphadenopathy identified in the mediastinum and hilar regions. Cardiac  chambers within normal limits. There is no pericardial effusion. The  upper abdomen reveals metastatic disease diffusely throughout the liver.  There is abnormal appearance of the left kidney with stranding.  Adenopathy in the left perirenal space as well as retroperitoneal  adenopathy. Visualized spleen is homogeneous. Diverticulosis of the  colon. Pancreas is homogeneous. The bony structures reveal degenerative  changes seen within the spine.       Impression:       Bilateral pleural effusions are moderate to large in size,  left greater than right, with significant increase seen in the  lymphangitic spread of disease,  now with consolidation seen in the  perihilar regions bilaterally with multiple fibronodular densities  identified. The examination is  atypical in appearance for COVID-19  pneumonia. Findings most suggestive again of lymphangitic spread of  disease. A superimposed central infiltrate cannot be completely  excluded, and clinical correlation is needed. There is extensive  metastatic disease identified within the upper abdomen.     DICTATED:   04/05/2020  EDITED/ls :   04/05/2020        CT Head Without Contrast Stroke Protocol [[id number]] Collected:  04/05/20 1202     Updated:  04/05/20 1210    Narrative:       EXAMINATION: CT HEAD WO CONTRAST  - 04/05/2020     INDICATION: Generalized weakness. Evaluate for stroke.     TECHNIQUE: Multiple axial CT imaging is obtained of the head from skull  base to skull vertex without the administration of intravenous contrast.  Stroke protocol.     The radiation dose reduction device was turned on for each scan per the  ALARA (As Low as Reasonably Achievable) protocol.     COMPARISON: 03/02/2020     FINDINGS: The brain parenchyma is unremarkable in appearance.  Low-density area identified in the right basal ganglia possibly from  prior insult. This is stable and unchanged when compared to the prior  study. No hemorrhage or hydrocephalus. No mass, mass effect or midline  shift. No abnormal extra-axial fluid collection identified. Bony  structures reveal no evidence of osseous abnormality. Visualized  paranasal sinuses are clear. The mastoid air cells are patent.       Impression:       Old insult to the right basal ganglia, small in size with no  acute intracranial abnormality identified.     Examination was performed 04/05/2020 at 11:46 AM and examination results  were given to the ER physician at the scanner by Dr. Lindsay on 04/05/2020  at 11:48 AM.     DICTATED:   04/05/2020  EDITED/ls :   04/05/2020               Results for orders placed during the hospital encounter of 01/29/20   Adult Transthoracic Echo Complete W/ Cont if Necessary Per Protocol    Narrative · Estimated EF = 50-55%.  · Left ventricular systolic  function is low normal.  · Mild tricuspid valve regurgitation is present.  · Calculated right ventricular systolic pressure from tricuspid   regurgitation is 27 mmHg.  · The global longitudinal strain is calculated at -17 which is slightly   less than normal (normal is -20)          Assessment/Plan   Assessment & Plan     Active Hospital Problems    Diagnosis POA   • **Acute respiratory failure with hypoxia and hypercapnia (CMS/HCC) [J96.01, J96.02] Yes     Priority: Medium   • Acute hypoxemic respiratory failure (CMS/HCC) [J96.01] Yes   • CKD (chronic kidney disease) stage 3, GFR 30-59 ml/min (CMS/HCC) [N18.3] Yes   • Malignant pleural effusion [J91.0] Yes   • Metastatic cancer (CMS/HCC) [C79.9] Yes   • Bone metastases (CMS/HCC) [C79.51] Yes   • Renal cancer, left (CMS/HCC) [C64.2] Yes   • Lumbar spine tumor [D49.2] Yes       Mr. Evans is an unfortunate 54-year-old male with widely metastatic renal cell carcinoma followed by Dr. Coffey and now on second line chemotherapy with Gemzar and carboplatin.  I personally reviewed chest CT which shows left greater than right pleural effusions with worsening lymphangitic spread compared to March.  He has acute hypoxic and hypercarbic respiratory failure as a result of this.  I discussed with him and his sister via phone (Felecia GALVEZ with hospitalist) that he is a very ill.  Could have worsening tonight.  Based on my discussion with him and his sister we have opted for a DO NOT RESUSCITATE and overall goals towards comfort which I think is appropriate.  I have a low suspicion for COVID based on history, CT, and risk factors but cannot completely exclude.  He has been placed on the COVID rule out floor.  Will defer to day team to determine testing.  Pending that decision the patient will need a thoracentesis for symptoms, Dr. Coffey consult, and palliative care consult.  For now we will provide him oxygen and symptom support with morphine, Phenergan.  Agree with the  Lasix given him by the emergency room.  Creatinine is on the upper limits of his recent baseline.  I will defer blood work in the morning due to general comfort based approach.      Addendum: Discussed with Dr. Zavala, will sent COVID test to [facility] in AM.    COVID-19 RISK SCREEN     1. Has the patient had close contact without PPE with a lab confirmed COVID-19 (+) person or a person under investigation (PUI) for COVID-19 infection?  -- No     2. Has the patient had respiratory symptoms, worsened/new cough and/or SOA, unexplained fever, or sudden loss of smell and/or taste in the past 7 days? --  Yes    3. Does the patient have baseline higher exposure risk such as working in healthcare field, currently residing in healthcare facility, or ongoing hemodialysis?  --  No         Critical Care time spent in direct patient care:    40 minutes (excluding procedure time, if applicable) including high complexity decision making to assess and treat vital organ system failure in this individual who has impairment of one or more vital organ systems such that there is a high probability of imminent or life threatening deterioration in the patient’s condition and failure to initiate the above interventions on an urgent basis would likely result in sudden, clinically significant or life threatening deterioration in the patient's condition.  16:08        DVT prophylaxis:  lovenox    CODE STATUS:    Code Status and Medical Interventions:   Ordered at: 04/05/20 5228     Level Of Support Discussed With:    Patient    Health Care Surrogate     Code Status:    No CPR     Medical Interventions (Level of Support Prior to Arrest):    Comfort Measures       Admission Status:  I believe this patient meets inpatient criteria based on acute respiratory failure with moderate respiratory distress, interval worsening of lymphangitic spread of his cancer.  Anticipate him being in the hospital least 2 nights.    Electronically signed  by Jose Vu MD, 04/05/20, 3:52 PM.

## 2020-04-06 NOTE — PROGRESS NOTES
Continued Stay Note  [facility]     Patient Name: Alphonso Evans  MRN: [id number]  Today's Date: 4/6/2020    Admit Date: 4/5/2020    Discharge Plan     Row Name 04/06/20 1422       Plan    Plan  Initial CM eval     Patient/Family in Agreement with Plan  yes    Plan Comments  Patient lives with his sister Yolanda in [facility]. He has been undergoing chemotherapy but upon this admission patient and sister are now pursuing comfort measures. His covid-19 test is currently pending - once resulted, further arrangements can be made - CM following- yolanda [phone]     Final Discharge Disposition Code  30 - still a patient        Discharge Codes    No documentation.             Yolanda Solano RN

## 2020-04-06 NOTE — PAYOR COMM NOTE
"Maribel Pandey RN   Phone [phone]  Fax [phone]      Farnaz Jett (54 y.o. Male)     Date of Birth Social Security Number Address Home Phone MRN    1965  [id number] [facility] [address] [phone] [id number]    Protestant Marital Status          None        Admission Date Admission Type Admitting Provider Attending Provider Department, Room/Bed    20 Emergency Niecy Gracia MD Reddy, Mayuri V, MD [facility] 6A, N623/1    Discharge Date Discharge Disposition Discharge Destination                       Attending Provider:  Niecy Gracia MD    Allergies:  No Known Allergies    Isolation:  Contact Air   Infection:  COVID (rule out) (20)   Code Status:  No CPR    Ht:  170.2 cm (67\")   Wt:  86.2 kg (190 lb)    Admission Cmt:  None   Principal Problem:  Acute respiratory failure with hypoxia and hypercapnia (CMS/HCC) [J96.01,J96.02]                 Active Insurance as of 2020     Primary Coverage     Payor Plan Insurance Group Employer/Plan Group    ANTHEM MEDICAID ANTHEM MEDICAID [id number]     Payor Plan Address Payor Plan Phone Number Payor Plan Fax Number Effective Dates    [address] [address] [phone]  2019 - None Entered    [facility] [id number]       Subscriber Name Subscriber Birth Date Member ID       FARNAZ JETT 1965 [id number]                 Emergency Contacts      (Rel.) Home Phone Work Phone Mobile Phone    INDIO SINGH (Sister) [phone] -- [phone]               History & Physical      DossettJose MD at 20 1546              [facility] Medicine Services  HISTORY AND PHYSICAL    Patient Name: Farnaz Jett  : 1965  MRN: [id number]  Primary Care Physician: Juan Mccarty MD  Date of admission: 2020      Subjective   Subjective     Chief Complaint:  Shortness of breath    HPI:  Farnaz Jett is a 54 y.o. male with " a history of metastatic renal cell carcinoma initially diagnosed in February 2019.  Is found to be widely metastatic including bones, liver, recent lymphangitic spread in the lungs.  Followed by Dr. Coffey and started on Keytruda and axitinib in January.  Had disease progression and most recently started on carboplatin and Gemzar.  Presents to the emergency room with progressive shortness of breath.  Has no malignant pleural effusions and last tapped on 3/4.  Patient is currently a poor historian.  I did discuss with his sister who is a nurse practitioner who states that his breathing has gotten worse over the last few days.  Normally does not wear oxygen at baseline.  He denies any fevers.  Positive nonproductive cough.  He has no recent travel, mostly stays inside, has no known COVID exposures.  Work-up in the emergency room included a CT of his chest which showed recurrent left greater than right pleural effusions.  And progression of lymphangitic spread in the lungs.  ABG showed hypoxia and hypercarbia.  He was given 40 mg of IV Lasix.  He is being admitted for further evaluation.    Review of Systems   Gen- No fevers, chills  CV- No chest pain, palpitations  Resp- + cough, + dyspnea  GI- + decreased appetite  + LE edema    All other systems reviewed and are negative.     Personal History     Past Medical History:   Diagnosis Date   • Cancer (CMS/HCC)     Renal, Spine, Brain   • Hypertension        Past Surgical History:   Procedure Laterality Date   • CYBERKNIFE Right 01/09/2020    Right paraspinal lesion behind the L2 vertebral body- Dr. Michael Rizo    • KNEE SURGERY Left     x 3   • ROTATOR CUFF REPAIR Right 2014       Family History: family history includes Cancer in his father; Heart disease in his father. Otherwise pertinent FHx was reviewed and unremarkable.     Social History:  reports that he has never smoked. He has never used smokeless tobacco. He reports that he does not drink alcohol or use  drugs.  Social History     Social History Narrative    Lives in [place]. 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        Medications:  Available home medication information reviewed.    No Known Allergies    Objective   Objective     Vital Signs:   Temp:  [99.2 °F (37.3 °C)] 99.2 °F (37.3 °C)  Heart Rate:  [93-99] 99  Resp:  [18-20] 20  BP: (109-120)/(67-73) 120/67   Total (NIH Stroke Scale): 0    Physical Exam   Constitutional: Awake, alert, sitting up in bed, ill appearing  Eyes: PERRLA, sclerae anicteric, no conjunctival injection  HENT: NCAT, mucous membranes moist  Neck: Supple, no thyromegaly, no lymphadenopathy, trachea midline  Respiratory: diminished breath sounds at bases, tachypnic, on NRB, in moderate resp distress with accessory muscle use, unable to complete sentences  Cardiovascular: tachy, regular, no murmurs, rubs, or gallops  Gastrointestinal: Positive bowel sounds, soft, nontender, nondistended  Musculoskeletal: 1+ bilateral ankle edema, no clubbing or cyanosis to extremities  Psychiatric: Appropriate affect, cooperative  Neurologic: Mild confusion, but oriented, moves all 4, follows commands, no focal weakness  Skin: No rashes      Results Reviewed:  I have personally reviewed current lab and radiology data.    Results from last 7 days   Lab Units 04/05/20  1210  04/05/20  1159   WBC 10*3/mm3 2.68*  --   --    HEMOGLOBIN g/dL 12.2*  --   --    HEMOGLOBIN, POC   --    < >  --    HEMATOCRIT % 37.2*  --   --    HEMATOCRIT POC   --    < >  --    PLATELETS 10*3/mm3 113*  --   --    INR   --   --  1.2    < > = values in this interval not displayed.     Results from last 7 days   Lab Units 04/05/20  1213 04/05/20  1210   SODIUM mmol/L  --  137   POTASSIUM mmol/L  --  4.7   CHLORIDE mmol/L  --  95*   CO2 mmol/L  --  28.0   BUN mg/dL  --  42*   CREATININE mg/dL  --  1.86*   GLUCOSE mg/dL  --  109*   CALCIUM mg/dL  --  8.7   ALT (SGPT) U/L  --  44*   AST (SGOT) U/L  --  23   TROPONIN  T ng/mL  --  <0.010   PROBNP pg/mL  --  3,078.0*   LACTATE mmol/L 2.1*  --    PROCALCITONIN ng/mL  --  0.41*     Estimated Creatinine Clearance: 47 mL/min (A) (by C-G formula based on SCr of 1.86 mg/dL (H)).  Brief Urine Lab Results  (Last result in the past 365 days)      Color   Clarity   Blood   Leuk Est   Nitrite   Protein   CREAT   Urine HCG        04/05/20 1527 Yellow Clear Negative Negative Negative 30 mg/dL (1+)             Imaging Results (Last 24 Hours)     Procedure Component Value Units Date/Time    XR Chest 1 View [[id number]] Collected:  04/05/20 1313     Updated:  04/05/20 1420    Narrative:          EXAMINATION: XR CHEST, SINGLE VIEW - 04/05/2020     INDICATION: Shortness of air, difficulty breathing. Stroke protocol.     COMPARISON: 03/02/2020     FINDINGS: Portable chest reveals worsening of the patchy airspace  disease identified within the mid and lower lung fields bilaterally with  small bilateral pleural effusions which are slightly worsened in the  interval. The heart is borderline enlarged. Degenerative changes seen  within the spine. Upper lung fields again reveal increased markings  bilaterally. Degenerative changes seen within the spine.           Impression:       Patchy airspace disease seen within the mid and lower lung  fields bilaterally with small bilateral pleural effusions. Findings have  worsened in the interval.     DICTATED:   04/05/2020  EDITED/ls :   04/05/2020        CT Chest Without Contrast [[id number]] Collected:  04/05/20 1336     Updated:  04/05/20 1358    Narrative:       EXAMINATION: CT CHEST WO CONTRAST - 04/05/2020     INDICATION: Shortness of breath, generalized weakness, low O2 sats.     TECHNIQUE: Multiple axial CT imaging is obtained of the chest without  the administration of intravenous contrast.     The radiation dose reduction device was turned on for each scan per the  ALARA (As Low as Reasonably Achievable) protocol.     COMPARISON: 03/12/2020     FINDINGS:  There are bilateral pleural effusions, left greater than right  and moderate to large in size. Multiple fibronodular densities seen  diffusely throughout the aerated portions of the lung fields with  consolidation seen in the perihilar regions bilaterally, all suggesting  lymphangitic spread of disease which is significantly worsened on  today's examination than when compared to the prior study. The  examination is atypical in appearance with uncommon reported features  for COVID-19 pneumonia. Alternative diagnoses should be considered such  as lymphangitic spread of disease. A superimposed pneumonia in the  perihilar regions cannot completely be excluded, however, clinical  correlation is needed. There is no mediastinal mass. Similar  lymphadenopathy identified in the mediastinum and hilar regions. Cardiac  chambers within normal limits. There is no pericardial effusion. The  upper abdomen reveals metastatic disease diffusely throughout the liver.  There is abnormal appearance of the left kidney with stranding.  Adenopathy in the left perirenal space as well as retroperitoneal  adenopathy. Visualized spleen is homogeneous. Diverticulosis of the  colon. Pancreas is homogeneous. The bony structures reveal degenerative  changes seen within the spine.       Impression:       Bilateral pleural effusions are moderate to large in size,  left greater than right, with significant increase seen in the  lymphangitic spread of disease,  now with consolidation seen in the  perihilar regions bilaterally with multiple fibronodular densities  identified. The examination is atypical in appearance for COVID-19  pneumonia. Findings most suggestive again of lymphangitic spread of  disease. A superimposed central infiltrate cannot be completely  excluded, and clinical correlation is needed. There is extensive  metastatic disease identified within the upper abdomen.     DICTATED:   04/05/2020  EDITED/ls :   04/05/2020        CT Head  Without Contrast Stroke Protocol [[id number]] Collected:  04/05/20 1202     Updated:  04/05/20 1210    Narrative:       EXAMINATION: CT HEAD WO CONTRAST  - 04/05/2020     INDICATION: Generalized weakness. Evaluate for stroke.     TECHNIQUE: Multiple axial CT imaging is obtained of the head from skull  base to skull vertex without the administration of intravenous contrast.  Stroke protocol.     The radiation dose reduction device was turned on for each scan per the  ALARA (As Low as Reasonably Achievable) protocol.     COMPARISON: 03/02/2020     FINDINGS: The brain parenchyma is unremarkable in appearance.  Low-density area identified in the right basal ganglia possibly from  prior insult. This is stable and unchanged when compared to the prior  study. No hemorrhage or hydrocephalus. No mass, mass effect or midline  shift. No abnormal extra-axial fluid collection identified. Bony  structures reveal no evidence of osseous abnormality. Visualized  paranasal sinuses are clear. The mastoid air cells are patent.       Impression:       Old insult to the right basal ganglia, small in size with no  acute intracranial abnormality identified.     Examination was performed 04/05/2020 at 11:46 AM and examination results  were given to the ER physician at the scanner by Dr. Lindsay on 04/05/2020  at 11:48 AM.     DICTATED:   04/05/2020  EDITED/ls :   04/05/2020               Results for orders placed during the hospital encounter of 01/29/20   Adult Transthoracic Echo Complete W/ Cont if Necessary Per Protocol    Narrative · Estimated EF = 50-55%.  · Left ventricular systolic function is low normal.  · Mild tricuspid valve regurgitation is present.  · Calculated right ventricular systolic pressure from tricuspid   regurgitation is 27 mmHg.  · The global longitudinal strain is calculated at -17 which is slightly   less than normal (normal is -20)          Assessment/Plan   Assessment & Plan     Active Hospital Problems    Diagnosis  POA   • **Acute respiratory failure with hypoxia and hypercapnia (CMS/HCC) [J96.01, J96.02] Yes     Priority: Medium   • Acute hypoxemic respiratory failure (CMS/HCC) [J96.01] Yes   • CKD (chronic kidney disease) stage 3, GFR 30-59 ml/min (CMS/HCC) [N18.3] Yes   • Malignant pleural effusion [J91.0] Yes   • Metastatic cancer (CMS/HCC) [C79.9] Yes   • Bone metastases (CMS/HCC) [C79.51] Yes   • Renal cancer, left (CMS/HCC) [C64.2] Yes   • Lumbar spine tumor [D49.2] Yes       Mr. Evans is an unfortunate 54-year-old male with widely metastatic renal cell carcinoma followed by Dr. Coffey and now on second line chemotherapy with Gemzar and carboplatin.  I personally reviewed chest CT which shows left greater than right pleural effusions with worsening lymphangitic spread compared to March.  He has acute hypoxic and hypercarbic respiratory failure as a result of this.  I discussed with him and his sister via phone (Felecia GALVEZ with hospitalist) that he is a very ill.  Could have worsening tonight.  Based on my discussion with him and his sister we have opted for a DO NOT RESUSCITATE and overall goals towards comfort which I think is appropriate.  I have a low suspicion for COVID based on history, CT, and risk factors but cannot completely exclude.  He has been placed on the COVID rule out floor.  Will defer to day team to determine testing.  Pending that decision the patient will need a thoracentesis for symptoms, Dr. Coffey consult, and palliative care consult.  For now we will provide him oxygen and symptom support with morphine, Phenergan.  Agree with the Lasix given him by the emergency room.  Creatinine is on the upper limits of his recent baseline.  I will defer blood work in the morning due to general comfort based approach.      Addendum: Discussed with Dr. Zavala, will sent COVID test to [facility] in AM.    COVID-19 RISK SCREEN     1. Has the patient had close contact without PPE with a lab  confirmed COVID-19 (+) person or a person under investigation (PUI) for COVID-19 infection?  -- No     2. Has the patient had respiratory symptoms, worsened/new cough and/or SOA, unexplained fever, or sudden loss of smell and/or taste in the past 7 days? --  Yes    3. Does the patient have baseline higher exposure risk such as working in healthcare field, currently residing in healthcare facility, or ongoing hemodialysis?  --  No         Critical Care time spent in direct patient care:    40 minutes (excluding procedure time, if applicable) including high complexity decision making to assess and treat vital organ system failure in this individual who has impairment of one or more vital organ systems such that there is a high probability of imminent or life threatening deterioration in the patient’s condition and failure to initiate the above interventions on an urgent basis would likely result in sudden, clinically significant or life threatening deterioration in the patient's condition.  16:08        DVT prophylaxis:  lovenox    CODE STATUS:    Code Status and Medical Interventions:   Ordered at: 04/05/20 1552     Level Of Support Discussed With:    Patient    Health Care Surrogate     Code Status:    No CPR     Medical Interventions (Level of Support Prior to Arrest):    Comfort Measures       Admission Status:  I believe this patient meets inpatient criteria based on acute respiratory failure with moderate respiratory distress, interval worsening of lymphangitic spread of his cancer.  Anticipate him being in the hospital least 2 nights.    Electronically signed by Jose Vu MD, 04/05/20, 3:52 PM.      Electronically signed by Jose Vu MD at 04/05/20 2252          Emergency Department Notes      Abdulaziz Sánchez MD at 04/05/20 1152      Procedure Orders    1. Critical Care [[id number]] ordered by Abdulaziz Sánchez MD at 04/05/20 1241                Subjective   Alphonso Evans is a 54 y.o.male who  presents to the ED with complaints of a neurologic problem. His last known time well is midnight last night. When he woke up this morning, the patient was experiencing confusion, generalized weakness, and myalgias. He has not taken any medication for his symptoms. He also complains of a headache, generalized abdominal pain, and diarrhea. He denies any chest pain, nausea, vomiting, or cough. Additionally, he has a history of kidney cancer with metastases to the liver and lung. He is followed by Dr. Coffey for this. He is undergoing chemotherapy treatment, as his most recent appointment was nine days ago. He denies experiencing any symptoms similar to his current discomfort since undegoing chemotherapy. There are no other complaints at this time.       History provided by:  Patient  Neurologic Problem   The patient's primary symptoms include an altered mental status and weakness (generalized). This is a new problem. The current episode started today. The neurological problem developed suddenly. The last time the patient was known to be well was 4/5/2020 12:00 AM.  The problem is unchanged. There was no focality noted. Associated symptoms include abdominal pain, confusion and headaches. Pertinent negatives include no chest pain, nausea or vomiting. Past treatments include nothing. The treatment provided no relief.       Review of Systems   Respiratory: Negative for cough.    Cardiovascular: Negative for chest pain.   Gastrointestinal: Positive for abdominal pain and diarrhea. Negative for nausea and vomiting.   Musculoskeletal: Positive for myalgias.   Neurological: Positive for weakness (generalized) and headaches.   Psychiatric/Behavioral: Positive for confusion.   All other systems reviewed and are negative.      Past Medical History:   Diagnosis Date   • Cancer (CMS/HCC)     Renal, Spine, Brain   • Hypertension        No Known Allergies    Past Surgical History:   Procedure Laterality Date   • CYBERKNIFE Right  01/09/2020    Right paraspinal lesion behind the L2 vertebral body- Dr. Michael Rizo    • KNEE SURGERY Left     x 3   • ROTATOR CUFF REPAIR Right 2014       Family History   Problem Relation Age of Onset   • Cancer Father    • Heart disease Father        Social History     Socioeconomic History   • Marital status:      Spouse name: Not on file   • Number of children: Not on file   • Years of education: Not on file   • Highest education level: Not on file   Tobacco Use   • Smoking status: Never Smoker   • Smokeless tobacco: Never Used   Substance and Sexual Activity   • Alcohol use: Never     Frequency: Never   • Drug use: Never   • Sexual activity: Defer   Social History Narrative    Lives in [place]. 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          Objective   Physical Exam   Constitutional: He is oriented to person, place, and time. He appears well-developed and well-nourished.   HENT:   Head: Normocephalic and atraumatic.   Eyes: Conjunctivae are normal. No scleral icterus.   Neck: Normal range of motion. Neck supple.   Cardiovascular: Normal rate, regular rhythm, normal heart sounds and intact distal pulses.   No murmur heard.  Pulmonary/Chest: Effort normal and breath sounds normal. No respiratory distress.   Abdominal: Soft. Bowel sounds are normal. There is no tenderness.   Musculoskeletal: Normal range of motion. He exhibits no edema.   Neurological: He is alert and oriented to person, place, and time.   Moves all extremities equally and at full strength.    Skin: Skin is warm and dry.   Psychiatric: He has a normal mood and affect. His behavior is normal.   Nursing note and vitals reviewed.      Critical Care  Performed by: Abdulaziz Sánchez MD  Authorized by: Abdulaziz Sánchez MD     Critical care provider statement:     Critical care time (minutes):  35    Critical care time was exclusive of:  Separately billable procedures and treating other patients    Critical care was  necessary to treat or prevent imminent or life-threatening deterioration of the following conditions:  CNS failure or compromise    Critical care was time spent personally by me on the following activities:  Ordering and performing treatments and interventions, ordering and review of laboratory studies, ordering and review of radiographic studies, pulse oximetry, re-evaluation of patient's condition, review of old charts, development of treatment plan with patient or surrogate, discussions with consultants, evaluation of patient's response to treatment, examination of patient and obtaining history from patient or surrogate    I assumed direction of critical care for this patient from another provider in my specialty: no              ED Course  ED Course as of Apr 05 1604   Sun Apr 05, 2020   1332 Patient is seen emergently in the CT scanner for stroke evaluation and stroke alert.  Initial examination was on the CT scanner table.  He is confused but had no focal neurologic deficit at that time.Patient sats dropped.  He is reevaluated and has moderate expiratory wheezing.  His temp is 99.2.  He has diminished breath sounds bilaterally and now examined not in the CT scanner but in the room while sitting upright, in a much better examinationCT of the chest without contrast is obtained.  Final radiologic interpretation is pending but findings are concerning.Laboratory evaluation is ongoing.  Troponin is negative.  Patient has renal insufficiency, but has had elevated creatinine in the past.  His lactate is 2.1 ammonia is normal.    [HH]   1420 Contacted Dr. Clemente, radiology who feels that the CT looks like lymphangitic spread, not novel coronavirus, or at least not high or moderate probability notable coronavirus.  Sats are now in the upper 90s on 10 L O2.  BNP is elevated.I will culture the patient and treat with antibiotics as there are possible infiltrates as well.  Of discussed this with pulmonary.    [HH]   6635 I  discussed the findings with the patient and a plan admission.  I have discussed his living well.  He reports that he does not want to be intubated or placed on a ventilator.    []   1423   If he has declined from his cancer he does not want resuscitation.He agrees with plan admission.  We will treat him with pulmonary medications    [HH]   1456 I discussed findings with Dr. Vu who will admit for definitive inpatient care.  We reviewed my discussion with the patient with instructions for no intubation, no ventilation.  He additionally stated if he has cardiac arrest due to progression of his cancer no resuscitation    [HH]      ED Course User Index  [] Abdulaziz Sánchez MD     Recent Results (from the past 24 hour(s))   POC Protime / INR    Collection Time: 04/05/20 11:59 AM   Result Value Ref Range    Protime 14.3 12.8 - 15.2 seconds    INR 1.2 0.8 - 1.2   POC Creatinine    Collection Time: 04/05/20 11:59 AM   Result Value Ref Range    Creatinine 2.00 (H) 0.60 - 1.30 mg/dL   POC Surgery Labs    Collection Time: 04/05/20 12:00 PM   Result Value Ref Range    Ionized Calcium 1.20 1.20 - 1.32 mmol/L    POC Potassium 4.6 3.5 - 4.9 mmol/L    Sodium 134 (L) 138 - 146 mmol/L    Total CO2 31 (H) 24 - 29 mmol/L    Hemoglobin 11.9 (L) 12.0 - 17.0 g/dL    Hematocrit 35 (L) 38 - 51 %    pCO2, Arterial 61.8 (H) 35 - 45 mm Hg    pO2, Arterial 15 (L) 80 - 105 mmHg    Base Excess 2.0000 -5 - 5 mmol/L    O2 Saturation, Arterial 16 (L) 95 - 98 %    pH, Arterial 7.28 (L) 7.35 - 7.6 pH units    HCO3, Arterial 28.9 (H) 22 - 26 mmol/L   aPTT    Collection Time: 04/05/20 12:10 PM   Result Value Ref Range    PTT 25.6 24.0 - 37.0 seconds   Troponin    Collection Time: 04/05/20 12:10 PM   Result Value Ref Range    Troponin T <0.010 0.000 - 0.030 ng/mL   Comprehensive Metabolic Panel    Collection Time: 04/05/20 12:10 PM   Result Value Ref Range    Glucose 109 (H) 65 - 99 mg/dL    BUN 42 (H) 6 - 20 mg/dL    Creatinine 1.86 (H) 0.76 -  1.27 mg/dL    Sodium 137 136 - 145 mmol/L    Potassium 4.7 3.5 - 5.2 mmol/L    Chloride 95 (L) 98 - 107 mmol/L    CO2 28.0 22.0 - 29.0 mmol/L    Calcium 8.7 8.6 - 10.5 mg/dL    Total Protein 6.8 6.0 - 8.5 g/dL    Albumin 3.70 3.50 - 5.20 g/dL    ALT (SGPT) 44 (H) 1 - 41 U/L    AST (SGOT) 23 1 - 40 U/L    Alkaline Phosphatase 80 39 - 117 U/L    Total Bilirubin 0.4 0.2 - 1.2 mg/dL    eGFR Non African Amer 38 (L) >60 mL/min/1.73    Globulin 3.1 gm/dL    A/G Ratio 1.2 g/dL    BUN/Creatinine Ratio 22.6 7.0 - 25.0    Anion Gap 14.0 5.0 - 15.0 mmol/L   Ammonia    Collection Time: 04/05/20 12:10 PM   Result Value Ref Range    Ammonia 32 16 - 60 umol/L   Magnesium    Collection Time: 04/05/20 12:10 PM   Result Value Ref Range    Magnesium 2.4 1.6 - 2.6 mg/dL   Light Blue Top    Collection Time: 04/05/20 12:10 PM   Result Value Ref Range    Extra Tube hold for add-on    Green Top (Gel)    Collection Time: 04/05/20 12:10 PM   Result Value Ref Range    Extra Tube Hold for add-ons.    Lavender Top    Collection Time: 04/05/20 12:10 PM   Result Value Ref Range    Extra Tube hold for add-on    Gold Top - SST    Collection Time: 04/05/20 12:10 PM   Result Value Ref Range    Extra Tube Hold for add-ons.    CBC Auto Differential    Collection Time: 04/05/20 12:10 PM   Result Value Ref Range    WBC 2.68 (L) 3.40 - 10.80 10*3/mm3    RBC 4.23 4.14 - 5.80 10*6/mm3    Hemoglobin 12.2 (L) 13.0 - 17.7 g/dL    Hematocrit 37.2 (L) 37.5 - 51.0 %    MCV 87.9 79.0 - 97.0 fL    MCH 28.8 26.6 - 33.0 pg    MCHC 32.8 31.5 - 35.7 g/dL    RDW 13.8 12.3 - 15.4 %    RDW-SD 44.0 37.0 - 54.0 fl    MPV 9.8 6.0 - 12.0 fL    Platelets 113 (L) 140 - 450 10*3/mm3    Neutrophil % 50.4 42.7 - 76.0 %    Lymphocyte % 37.3 19.6 - 45.3 %    Monocyte % 9.0 5.0 - 12.0 %    Eosinophil % 0.7 0.3 - 6.2 %    Basophil % 0.7 0.0 - 1.5 %    Immature Grans % 1.9 (H) 0.0 - 0.5 %    Neutrophils, Absolute 1.35 (L) 1.70 - 7.00 10*3/mm3    Lymphocytes, Absolute 1.00 0.70 - 3.10  10*3/mm3    Monocytes, Absolute 0.24 0.10 - 0.90 10*3/mm3    Eosinophils, Absolute 0.02 0.00 - 0.40 10*3/mm3    Basophils, Absolute 0.02 0.00 - 0.20 10*3/mm3    Immature Grans, Absolute 0.05 0.00 - 0.05 10*3/mm3    nRBC 0.7 (H) 0.0 - 0.2 /100 WBC   C-reactive Protein    Collection Time: 04/05/20 12:10 PM   Result Value Ref Range    C-Reactive Protein 0.92 (H) 0.00 - 0.50 mg/dL   D-dimer, Quantitative    Collection Time: 04/05/20 12:10 PM   Result Value Ref Range    D-Dimer, Quantitative 2.61 (H) 0.00 - 0.56 MCGFEU/mL   Procalcitonin    Collection Time: 04/05/20 12:10 PM   Result Value Ref Range    Procalcitonin 0.41 (H) 0.10 - 0.25 ng/mL   Lactate Dehydrogenase    Collection Time: 04/05/20 12:10 PM   Result Value Ref Range     (H) 135 - 225 U/L   BNP    Collection Time: 04/05/20 12:10 PM   Result Value Ref Range    proBNP 3,078.0 (H) 5.0 - 900.0 pg/mL   Lactic Acid, Plasma    Collection Time: 04/05/20 12:13 PM   Result Value Ref Range    Lactate 2.1 (C) 0.5 - 2.0 mmol/L   Lactic Acid, Reflex Timer (This will reflex a repeat order 3-3:15 hours after ordered.)    Collection Time: 04/05/20 12:13 PM   Result Value Ref Range    Hold Tube Hold for add-ons.    Blood Gas, Arterial With Co-Ox    Collection Time: 04/05/20  2:45 PM   Result Value Ref Range    Site Right Radial     Andi's Test N/A     pH, Arterial 7.288 (L) 7.350 - 7.450 pH units    pCO2, Arterial 54.7 (H) 35.0 - 45.0 mm Hg    pO2, Arterial 164.0 (H) 83.0 - 108.0 mm Hg    HCO3, Arterial 26.1 (H) 20.0 - 26.0 mmol/L    Base Excess, Arterial -1.2 (L) 0.0 - 2.0 mmol/L    Hemoglobin, Blood Gas 11.4 (L) 13.5 - 17.5 g/dL    Hematocrit, Blood Gas 34.9 %    Oxyhemoglobin 96.9 94 - 99 %    Methemoglobin 1.30 0.00 - 1.50 %    Carboxyhemoglobin 1.1 0 - 2 %    CO2 Content 27.8 22 - 33 mmol/L    Temperature 37.0 C    Barometric Pressure for Blood Gas      Modality NRB     FIO2 60 %    Ventilator Mode       Note      pH, Temp Corrected 7.288 pH Units    pCO2,  Temperature Corrected 54.7 (H) 35 - 48 mm Hg    pO2, Temperature Corrected 164 (H) 83 - 108 mm Hg   Urinalysis With Culture If Indicated - Urine, Clean Catch    Collection Time: 04/05/20  3:27 PM   Result Value Ref Range    Color, UA Yellow Yellow, Straw    Appearance, UA Clear Clear    pH, UA <=5.0 5.0 - 8.0    Specific Gravity, UA 1.026 1.001 - 1.030    Glucose, UA Negative Negative    Ketones, UA Negative Negative    Bilirubin, UA Negative Negative    Blood, UA Negative Negative    Protein, UA 30 mg/dL (1+) (A) Negative    Leuk Esterase, UA Negative Negative    Nitrite, UA Negative Negative    Urobilinogen, UA 1.0 E.U./dL 0.2 - 1.0 E.U./dL   Urinalysis, Microscopic Only - Urine, Clean Catch    Collection Time: 04/05/20  3:27 PM   Result Value Ref Range    RBC, UA 0-2 None Seen, 0-2 /HPF    WBC, UA 0-2 None Seen, 0-2 /HPF    Bacteria, UA None Seen None Seen, Trace /HPF    Squamous Epithelial Cells, UA 0-2 None Seen, 0-2 /HPF    Hyaline Casts, UA 13-20 0 - 6 /LPF    Methodology Automated Microscopy      Note: In addition to lab results from this visit, the labs listed above may include labs taken at another facility or during a different encounter within the last 24 hours. Please correlate lab times with ED admission and discharge times for further clarification of the services performed during this visit.    CT Chest Without Contrast   Preliminary Result   Bilateral pleural effusions are moderate to large in size,   left greater than right, with significant increase seen in the   lymphangitic spread of disease,  now with consolidation seen in the   perihilar regions bilaterally with multiple fibronodular densities   identified. The examination is atypical in appearance for COVID-19   pneumonia. Findings most suggestive again of lymphangitic spread of   disease. A superimposed central infiltrate cannot be completely   excluded, and clinical correlation is needed. There is extensive   metastatic disease identified  "within the upper abdomen.       DICTATED:   04/05/2020   EDITED/ls :   04/05/2020           XR Chest 1 View   Preliminary Result   Patchy airspace disease seen within the mid and lower lung   fields bilaterally with small bilateral pleural effusions. Findings have   worsened in the interval.       DICTATED:   04/05/2020   EDITED/ls :   04/05/2020           CT Head Without Contrast Stroke Protocol   Preliminary Result   Old insult to the right basal ganglia, small in size with no   acute intracranial abnormality identified.       Examination was performed 04/05/2020 at 11:46 AM and examination results   were given to the ER physician at the scanner by Dr. Lindsay on 04/05/2020   at 11:48 AM.       DICTATED:   04/05/2020   EDITED/ls :   04/05/2020                 Vitals:    04/05/20 1215 04/05/20 1237 04/05/20 1449 04/05/20 1545   BP:    120/67   BP Location:    Right arm   Patient Position:    Lying   Pulse: 94  93 99   Resp: 18  20 20   Temp:  99.2 °F (37.3 °C)     TempSrc:  Oral     SpO2: 99%  98% 97%   Weight:  86.2 kg (190 lb)     Height:  170.2 cm (67\")       Medications   sodium chloride 0.9 % flush 10 mL (has no administration in time range)   sodium chloride 0.9 % bolus 1,000 mL (0 mL Intravenous Stopped 4/5/20 1551)   sodium chloride 0.9 % infusion (125 mL/hr Intravenous New Bag 4/5/20 1552)   albuterol sulfate HFA (PROVENTIL HFA;VENTOLIN HFA;PROAIR HFA) inhaler 6 puff (has no administration in time range)   furosemide (LASIX) injection 40 mg (has no administration in time range)   piperacillin-tazobactam (ZOSYN) 4.5 g in iso-osmotic dextrose 100 mL IVPB (premix) (0 g Intravenous Stopped 4/5/20 1527)   vancomycin 1750 mg/500 mL 0.9% NS IVPB ([facility]) (0 mg/kg × 86.2 kg Intravenous Stopped 4/5/20 1550)     ECG/EMG Results (last 24 hours)     ** No results found for the last 24 hours. **        ECG 12 Lead   Final Result   Test Reason : Acute Stroke Protocol (onset < 12 hrs)   Blood Pressure : **/** mmHG   Vent. Rate " : 093 BPM     Atrial Rate : 093 BPM      P-R Int : 136 ms          QRS Dur : 092 ms       QT Int : 348 ms       P-R-T Axes : 033 017 031 degrees      QTc Int : 432 ms      Normal sinus rhythm   When compared with ECG of 02-MAR-2020 19:00,   No significant change was found   Confirmed by NAVDEEP SÁNCHEZ MD ([id number]) on 4/5/2020 12:42:58 PM      Referred By:  RL           Confirmed By:NAVDEEP SÁNCHEZ MD                                                 MDM  Number of Diagnoses or Management Options     Amount and/or Complexity of Data Reviewed  Decide to obtain previous medical records or to obtain history from someone other than the patient: yes    Critical Care  Total time providing critical care: 30-74 minutes      Final diagnoses:   Confusion   Shortness of breath   Hypoxia   Metastatic cancer (CMS/HCC)   Acidosis   Renal insufficiency   Encntr for obs for susp expsr to oth biolg agents ruled out       Documentation assistance provided by wilian Singh.  Information recorded by the scribe was done at my direction and has been verified and validated by me.     Renzo Singh  04/05/20 1159       Renzo Singh  04/05/20 1200       Renzo Singh  04/05/20 1242       Navdeep Sánchez MD  04/05/20 1500       Navdeep Sánchez MD  04/05/20 1604      Electronically signed by Navdeep Sánchez MD at 04/05/20 1604       Vital Signs (last day)     Date/Time   Temp   Temp src   Pulse   Resp   BP   Patient Position   SpO2    04/06/20 0600   --   --   88   --   --   --   95    04/06/20 0500   --   --   87   --   --   --   94    04/06/20 0415   96.8 (36)   Oral   95   (!) 30   103/71   Lying   93    04/06/20 0400   --   --   93   --   --   --   95    04/06/20 0300   --   --   99   --   --   --   92    04/06/20 0200   --   --   96   --   --   --   93    04/06/20 0100   --   --   102   --   --   --   90    04/06/20 0000   97 (36.1)   Oral   100   (!) 30   113/65   Lying   90    04/05/20 2020   96.5 (35.8)   Oral   92    (!) 30   110/86   Lying   91    04/05/20 1853   --   --   105   --   --   --   91    04/05/20 1831   96.3 (35.7)   Oral   94   (!) 40   --   --   90    04/05/20 1827   --   --   107   --   132/76   --   --    04/05/20 1730   --   --   104   --   --   --   91    04/05/20 1646   --   --   --   --   121/73   --   --    04/05/20 1645   --   --   103   20   121/73   --   90    04/05/20 1545   --   --   99   20   120/67   Lying   97    04/05/20 1449   --   --   93   20   --   Lying   98    04/05/20 1237   99.2 (37.3)   Oral   --   --   --   --   --    04/05/20 1215   --   --   94   18   --   --   99    04/05/20 1208   --   --   98   18   109/73   --   99                Facility-Administered Medications as of 4/6/2020   Medication Dose Route Frequency Provider Last Rate Last Dose   • albuterol sulfate HFA (PROVENTIL HFA;VENTOLIN HFA;PROAIR HFA) inhaler 2 puff  2 puff Inhalation Q4H PRN Jose Vu MD       • albuterol sulfate HFA (PROVENTIL HFA;VENTOLIN HFA;PROAIR HFA) inhaler 6 puff  6 puff Inhalation Once Abdulaziz Sánchez MD       • [COMPLETED] furosemide (LASIX) injection 40 mg  40 mg Intravenous Once Abdulaziz Sánchez MD   40 mg at 04/05/20 1608   • guaiFENesin-codeine (ROMILAR-AC) syrup 10 mL  10 mL Oral TID PRN Jose Vu MD       • losartan (COZAAR) tablet 50 mg  50 mg Oral Daily DossetJose busby MD   50 mg at 04/06/20 0824   • Morphine sulfate (PF) injection 4 mg  4 mg Intravenous Q2H PRN Jose Vu MD   4 mg at 04/05/20 1827   • Morphine sulfate PCA 1 mg/mL 30 mL syringe  2 mg/hr Intravenous Continuous DosJose bryan MD       • ondansetron (ZOFRAN) tablet 8 mg  8 mg Oral TID PRN Jose Vu MD       • [COMPLETED] piperacillin-tazobactam (ZOSYN) 4.5 g in iso-osmotic dextrose 100 mL IVPB (premix)  4.5 g Intravenous Once Abdulaziz Sánchez MD   Stopped at 04/05/20 1527   • promethazine (PHENERGAN) injection 12.5 mg  12.5 mg Intravenous Q6H PRN Jose Vu MD       • sodium chloride 0.9 % bolus  1,000 mL  1,000 mL Intravenous Once Abdulaziz Sánchez MD   Stopped at 04/05/20 1551   • sodium chloride 0.9 % flush 10 mL  10 mL Intravenous PRN Abdulaziz Sánchez MD       • sodium chloride 0.9 % flush 10 mL  10 mL Intravenous Q12H Jose Vu MD   10 mL at 04/06/20 0825   • sodium chloride 0.9 % flush 10 mL  10 mL Intravenous PRN Jose Vu MD       • traMADol (ULTRAM) tablet 50 mg  50 mg Oral Q6H PRN Jose Vu MD   50 mg at 04/06/20 0112   • [COMPLETED] vancomycin 1750 mg/500 mL 0.9% NS IVPB ([facility])  20 mg/kg Intravenous Once Abdulaziz Sánchez MD   Stopped at 04/05/20 1550       Lab Results (last 24 hours)     Procedure Component Value Units Date/Time    SARS-CoV-2, PCR (IN-HOUSE), NP Swab in Transport Media - Swab, Nasopharynx [[id number]] Collected:  04/06/20 0030    Specimen:  Swab from Nasopharynx Updated:  04/06/20 0104    Lactic Acid, Reflex [[id number]]  (Normal) Collected:  04/05/20 1621    Specimen:  Blood Updated:  04/05/20 1713     Lactate 1.8 mmol/L      Comment: Falsely depressed results may occur on samples drawn from patients receiving N-Acetylcysteine (NAC) or Metamizole.       Respiratory Panel, PCR - Swab, Nasopharynx [[id number]]  (Normal) Collected:  04/05/20 1527    Specimen:  Swab from Nasopharynx Updated:  04/05/20 1637     ADENOVIRUS, PCR Not Detected     Coronavirus 229E Not Detected     Coronavirus HKU1 Not Detected     Coronavirus NL63 Not Detected     Coronavirus OC43 Not Detected     Human Metapneumovirus Not Detected     Human Rhinovirus/Enterovirus Not Detected     Influenza B PCR Not Detected     Parainfluenza Virus 1 Not Detected     Parainfluenza Virus 2 Not Detected     Parainfluenza Virus 3 Not Detected     Parainfluenza Virus 4 Not Detected     Bordetella pertussis pcr Not Detected     Influenza A H1 2009 PCR Not Detected     Chlamydophila pneumoniae PCR Not Detected     Mycoplasma pneumo by PCR Not Detected     Influenza A PCR Not Detected     Influenza A H3 Not  Detected     Influenza A H1 Not Detected     RSV, PCR Not Detected     Bordetella parapertussis PCR Not Detected    Narrative:       The coronavirus on the RVP is NOT COVID-19 and is NOT indicative of infection with COVID-19.     Lactic Acid, Reflex Timer (This will reflex a repeat order 3-3:15 hours after ordered.) [[id number]] Collected:  04/05/20 1213    Specimen:  Blood Updated:  04/05/20 1600     Hold Tube Hold for add-ons.     Comment: Auto resulted.       Urinalysis With Culture If Indicated - Urine, Clean Catch [[id number]]  (Abnormal) Collected:  04/05/20 1527    Specimen:  Urine, Clean Catch Updated:  04/05/20 1541     Color, UA Yellow     Appearance, UA Clear     pH, UA <=5.0     Specific Gravity, UA 1.026     Glucose, UA Negative     Ketones, UA Negative     Bilirubin, UA Negative     Blood, UA Negative     Protein, UA 30 mg/dL (1+)     Leuk Esterase, UA Negative     Nitrite, UA Negative     Urobilinogen, UA 1.0 E.U./dL    Urinalysis, Microscopic Only - Urine, Clean Catch [[id number]] Collected:  04/05/20 1527    Specimen:  Urine, Clean Catch Updated:  04/05/20 1541     RBC, UA 0-2 /HPF      WBC, UA 0-2 /HPF      Bacteria, UA None Seen /HPF      Squamous Epithelial Cells, UA 0-2 /HPF      Hyaline Casts, UA 13-20 /LPF      Methodology Automated Microscopy    Blood Culture - Blood, Arm, Left [[id number]] Collected:  04/05/20 1400    Specimen:  Blood from Arm, Left Updated:  04/05/20 1445    Blood Culture - Blood, Arm, Right [[id number]] Collected:  04/05/20 1200    Specimen:  Blood from Arm, Right Updated:  04/05/20 1445    Blood Gas, Arterial With Co-Ox [[id number]]  (Abnormal) Collected:  04/05/20 1445    Specimen:  Arterial Blood Updated:  04/05/20 1445     Site Right Radial     Andi's Test N/A     pH, Arterial 7.288 pH units      Comment: 84 Value below reference range        pCO2, Arterial 54.7 mm Hg      Comment: 83 Value above reference range        pO2, Arterial 164.0 mm Hg      Comment: 83 Value above  "reference range        HCO3, Arterial 26.1 mmol/L      Base Excess, Arterial -1.2 mmol/L      Hemoglobin, Blood Gas 11.4 g/dL      Comment: 84 Value below reference range        Hematocrit, Blood Gas 34.9 %      Oxyhemoglobin 96.9 %      Methemoglobin 1.30 %      Carboxyhemoglobin 1.1 %      CO2 Content 27.8 mmol/L      Temperature 37.0 C      Barometric Pressure for Blood Gas --     Comment: N/A        Modality NRB     FIO2 60 %      Ventilator Mode       Comment: Meter: [id number]     :  [id number]        Note --     pH, Temp Corrected 7.288 pH Units      pCO2, Temperature Corrected 54.7 mm Hg      pO2, Temperature Corrected 164 mm Hg     D-dimer, Quantitative [[id number]]  (Abnormal) Collected:  04/05/20 1210    Specimen:  Blood Updated:  04/05/20 1445     D-Dimer, Quantitative 2.61 MCGFEU/mL     Narrative:       The D-Dimer assay test is to be used in conjunction with a clinical pretest probability (PTP) assessment model, and has been approved by the FDA to exclude pulmonary embolism (PE) and deep venous thrombosis (DVT) in outpatients suspected of PE or DVT, with a cutoff of 0.5 MCGFEU/mL.    Procalcitonin [[id number]]  (Abnormal) Collected:  04/05/20 1210    Specimen:  Blood Updated:  04/05/20 1409     Procalcitonin 0.41 ng/mL     Narrative:       As a Marker for Sepsis (Non-Neonates):   1. <0.5 ng/mL represents a low risk of severe sepsis and/or septic shock.  1. >2 ng/mL represents a high risk of severe sepsis and/or septic shock.    As a Marker for Lower Respiratory Tract Infections that require antibiotic therapy:  PCT on Admission     Antibiotic Therapy             6-12 Hrs later  > 0.5                Strongly Recommended            >0.25 - <0.5         Recommended  0.1 - 0.25           Discouraged                   Remeasure/reassess PCT  <0.1                 Strongly Discouraged          Remeasure/reassess PCT      As 28 day mortality risk marker: \"Change in Procalcitonin Result\" (> 80 % or " <=80 %) if Day 0 (or Day 1) and Day 4 values are available. Refer to [email or web address]-[email or web address]/   Change in PCT <=80 %   A decrease of PCT levels below or equal to 80 % defines a positive change in PCT test result representing a higher risk for 28-day all-cause mortality of patients diagnosed with severe sepsis or septic shock.  Change in PCT > 80 %   A decrease of PCT levels of more than 80 % defines a negative change in PCT result representing a lower risk for 28-day all-cause mortality of patients diagnosed with severe sepsis or septic shock.                Results may be falsely decreased if patient taking Biotin.     C-reactive Protein [[id number]]  (Abnormal) Collected:  04/05/20 1210    Specimen:  Blood Updated:  04/05/20 1402     C-Reactive Protein 0.92 mg/dL     Lactate Dehydrogenase [[id number]]  (Abnormal) Collected:  04/05/20 1210    Specimen:  Blood Updated:  04/05/20 1402      U/L     BNP [[id number]]  (Abnormal) Collected:  04/05/20 1210    Specimen:  Blood Updated:  04/05/20 1359     proBNP 3,078.0 pg/mL     Narrative:       Among patients with dyspnea, NT-proBNP is highly sensitive for the detection of acute congestive heart failure. In addition NT-proBNP of <300 pg/ml effectively rules out acute congestive heart failure with 99% negative predictive value.    Results may be falsely decreased if patient taking Biotin.      [facility] Draw [[id number]] Collected:  04/05/20 1210    Specimen:  Blood Updated:  04/05/20 1315    Narrative:       The following orders were created for panel order [place] Draw.  Procedure                               Abnormality         Status                     ---------                               -----------         ------                     Light Blue Top[[id number]]                                   Final result               Green Top (Gel)[[id number]]                                  Final result               Lavender Top[[id number]]                                      Final result               Gold Top - SST[[id number]]                                   Final result                 Please view results for these tests on the individual orders.    Gold Top - SST [[id number]] Collected:  04/05/20 1210    Specimen:  Blood Updated:  04/05/20 1315     Extra Tube Hold for add-ons.     Comment: Auto resulted.       Light Blue Top [[id number]] Collected:  04/05/20 1210    Specimen:  Blood Updated:  04/05/20 1315     Extra Tube hold for add-on     Comment: Auto resulted       Green Top (Gel) [[id number]] Collected:  04/05/20 1210    Specimen:  Blood Updated:  04/05/20 1315     Extra Tube Hold for add-ons.     Comment: Auto resulted.       Lavender Top [[id number]] Collected:  04/05/20 1210    Specimen:  Blood Updated:  04/05/20 1315     Extra Tube hold for add-on     Comment: Auto resulted       Ammonia [[id number]]  (Normal) Collected:  04/05/20 1210    Specimen:  Blood Updated:  04/05/20 1302     Ammonia 32 umol/L     Lactic Acid, Plasma [[id number]]  (Abnormal) Collected:  04/05/20 1213    Specimen:  Blood Updated:  04/05/20 1247     Lactate 2.1 mmol/L      Comment: Falsely depressed results may occur on samples drawn from patients receiving N-Acetylcysteine (NAC) or Metamizole.       aPTT [[id number]]  (Normal) Collected:  04/05/20 1210    Specimen:  Blood Updated:  04/05/20 1243     PTT 25.6 seconds     Narrative:       PTT = The equivalent PTT values for the therapeutic range of heparin levels at 0.3 to 0.5 U/ml are 55 to 70 seconds.    Comprehensive Metabolic Panel [[id number]]  (Abnormal) Collected:  04/05/20 1210    Specimen:  Blood Updated:  04/05/20 1240     Glucose 109 mg/dL      BUN 42 mg/dL      Creatinine 1.86 mg/dL      Sodium 137 mmol/L      Potassium 4.7 mmol/L      Chloride 95 mmol/L      CO2 28.0 mmol/L      Calcium 8.7 mg/dL      Total Protein 6.8 g/dL      Albumin 3.70 g/dL      ALT (SGPT) 44 U/L      AST (SGOT) 23 U/L      Alkaline Phosphatase 80 U/L      Total  Bilirubin 0.4 mg/dL      eGFR Non African Amer 38 mL/min/1.73      Globulin 3.1 gm/dL      A/G Ratio 1.2 g/dL      BUN/Creatinine Ratio 22.6     Anion Gap 14.0 mmol/L     Narrative:       GFR Normal >60  Chronic Kidney Disease <60  Kidney Failure <15      Magnesium [[id number]]  (Normal) Collected:  04/05/20 1210    Specimen:  Blood Updated:  04/05/20 1240     Magnesium 2.4 mg/dL     Troponin [[id number]]  (Normal) Collected:  04/05/20 1210    Specimen:  Blood Updated:  04/05/20 1238     Troponin T <0.010 ng/mL     Narrative:       Troponin T Reference Range:  <= 0.03 ng/mL-   Negative for AMI  >0.03 ng/mL-     Abnormal for myocardial necrosis.  Clinicians would have to utilize clinical acumen, EKG, Troponin and serial changes to determine if it is an Acute Myocardial Infarction or myocardial injury due to an underlying chronic condition.       Results may be falsely decreased if patient taking Biotin.      CBC & Differential [[id number]] Collected:  04/05/20 1210    Specimen:  Blood Updated:  04/05/20 1227    Narrative:       The following orders were created for panel order CBC & Differential.  Procedure                               Abnormality         Status                     ---------                               -----------         ------                     CBC Auto Differential[[id number]]        Abnormal            Final result                 Please view results for these tests on the individual orders.    CBC Auto Differential [[id number]]  (Abnormal) Collected:  04/05/20 1210    Specimen:  Blood Updated:  04/05/20 1227     WBC 2.68 10*3/mm3      RBC 4.23 10*6/mm3      Hemoglobin 12.2 g/dL      Hematocrit 37.2 %      MCV 87.9 fL      MCH 28.8 pg      MCHC 32.8 g/dL      RDW 13.8 %      RDW-SD 44.0 fl      MPV 9.8 fL      Platelets 113 10*3/mm3      Neutrophil % 50.4 %      Lymphocyte % 37.3 %      Monocyte % 9.0 %      Eosinophil % 0.7 %      Basophil % 0.7 %      Immature Grans % 1.9 %      Neutrophils,  Absolute 1.35 10*3/mm3      Lymphocytes, Absolute 1.00 10*3/mm3      Monocytes, Absolute 0.24 10*3/mm3      Eosinophils, Absolute 0.02 10*3/mm3      Basophils, Absolute 0.02 10*3/mm3      Immature Grans, Absolute 0.05 10*3/mm3      nRBC 0.7 /100 WBC     POC Creatinine [[id number]]  (Abnormal) Collected:  04/05/20 1159    Specimen:  Blood Updated:  04/05/20 1216     Creatinine 2.00 mg/dL      Comment: Serial Number: [id number]:  [id number]       POC Surgery Labs [[id number]]  (Abnormal) Collected:  04/05/20 1200    Specimen:  Blood Updated:  04/05/20 1201     Ionized Calcium 1.20 mmol/L      POC Potassium 4.6 mmol/L      Sodium 134 mmol/L      Total CO2 31 mmol/L      Hemoglobin 11.9 g/dL      Hematocrit 35 %      pCO2, Arterial 61.8 mm Hg      pO2, Arterial 15 mmHg      Comment: Serial Number: [id number]:  [id number]        Base Excess 2.0000 mmol/L      O2 Saturation, Arterial 16 %      pH, Arterial 7.28 pH units      HCO3, Arterial 28.9 mmol/L     POC Protime / INR [[id number]]  (Normal) Collected:  04/05/20 1159    Specimen:  Blood Updated:  04/05/20 1201     Protime 14.3 seconds      INR 1.2     Comment: Serial Number: [id number]:  [id number]           Imaging Results (Last 24 Hours)     Procedure Component Value Units Date/Time    CT Chest Without Contrast [[id number]] Collected:  04/05/20 1336     Updated:  04/06/20 0828    Narrative:       EXAMINATION: CT CHEST WO CONTRAST - 04/05/2020     INDICATION: Shortness of breath, generalized weakness, low O2 sats.     TECHNIQUE: Multiple axial CT imaging is obtained of the chest without  the administration of intravenous contrast.     The radiation dose reduction device was turned on for each scan per the  ALARA (As Low as Reasonably Achievable) protocol.     COMPARISON: 03/12/2020     FINDINGS: There are bilateral pleural effusions, left greater than right  and moderate to large in size. Multiple fibronodular densities seen  diffusely throughout the aerated portions of  the lung fields with  consolidation seen in the perihilar regions bilaterally, all suggesting  lymphangitic spread of disease which is significantly worsened on  today's examination than when compared to the prior study. The  examination is atypical in appearance with uncommon reported features  for COVID-19 pneumonia. Alternative diagnoses should be considered such  as lymphangitic spread of disease. A superimposed pneumonia in the  perihilar regions cannot completely be excluded, however, clinical  correlation is needed. There is no mediastinal mass. Similar  lymphadenopathy identified in the mediastinum and hilar regions. Cardiac  chambers within normal limits. There is no pericardial effusion. The  upper abdomen reveals metastatic disease diffusely throughout the liver.  There is abnormal appearance of the left kidney with stranding.  Adenopathy in the left perirenal space as well as retroperitoneal  adenopathy. Visualized spleen is homogeneous. Diverticulosis of the  colon. Pancreas is homogeneous. The bony structures reveal degenerative  changes seen within the spine.       Impression:       Bilateral pleural effusions are moderate to large in size,  left greater than right, with significant increase seen in the  lymphangitic spread of disease,  now with consolidation seen in the  perihilar regions bilaterally with multiple fibronodular densities  identified. The examination is atypical in appearance for COVID-19  pneumonia. Findings most suggestive again of lymphangitic spread of  disease. A superimposed central infiltrate cannot be completely  excluded, and clinical correlation is needed. There is extensive  metastatic disease identified within the upper abdomen.     DICTATED:   04/05/2020  EDITED/ls :   04/05/2020      This report was finalized on 4/6/2020 8:25 AM by Dr. Raissa Clemente MD.       XR Chest 1 View [[id number]] Collected:  04/05/20 1313     Updated:  04/06/20 0828    Narrative:           EXAMINATION: XR CHEST, SINGLE VIEW - 04/05/2020     INDICATION: Shortness of air, difficulty breathing. Stroke protocol.     COMPARISON: 03/02/2020     FINDINGS: Portable chest reveals worsening of the patchy airspace  disease identified within the mid and lower lung fields bilaterally with  small bilateral pleural effusions which are slightly worsened in the  interval. The heart is borderline enlarged. Degenerative changes seen  within the spine. Upper lung fields again reveal increased markings  bilaterally. Degenerative changes seen within the spine.           Impression:       Patchy airspace disease seen within the mid and lower lung  fields bilaterally with small bilateral pleural effusions. Findings have  worsened in the interval.     DICTATED:   04/05/2020  EDITED/ls :   04/05/2020      This report was finalized on 4/6/2020 8:25 AM by Dr. Raissa Clemente MD.       CT Head Without Contrast Stroke Protocol [[id number]] Collected:  04/05/20 1202     Updated:  04/06/20 0828    Narrative:       EXAMINATION: CT HEAD WO CONTRAST  - 04/05/2020     INDICATION: Generalized weakness. Evaluate for stroke.     TECHNIQUE: Multiple axial CT imaging is obtained of the head from skull  base to skull vertex without the administration of intravenous contrast.  Stroke protocol.     The radiation dose reduction device was turned on for each scan per the  ALARA (As Low as Reasonably Achievable) protocol.     COMPARISON: 03/02/2020     FINDINGS: The brain parenchyma is unremarkable in appearance.  Low-density area identified in the right basal ganglia possibly from  prior insult. This is stable and unchanged when compared to the prior  study. No hemorrhage or hydrocephalus. No mass, mass effect or midline  shift. No abnormal extra-axial fluid collection identified. Bony  structures reveal no evidence of osseous abnormality. Visualized  paranasal sinuses are clear. The mastoid air cells are patent.       Impression:       Old  insult to the right basal ganglia, small in size with no  acute intracranial abnormality identified.     Examination was performed 04/05/2020 at 11:46 AM and examination results  were given to the ER physician at the scanner by Dr. Lindsay on 04/05/2020  at 11:48 AM.     DICTATED:   04/05/2020  EDITED/ls :   04/05/2020         This report was finalized on 4/6/2020 8:25 AM by Dr. Raissa Clemente MD.              Physician Progress Notes (last 24 hours) (Notes from 04/05/20 1153 through 04/06/20 1153)      Jose Vu MD at 04/05/20 1858        Patient remains very symptomatic after 4mg morphine.  RN states he is breathing 40 times a minute.  Plan to start morphine drip tonight.  I discussed with his sister and she is in agreement and plan comfort measures only at this point.  I will DC non-comfort based medications.    Electronically signed by Jose Vu MD, 04/05/20, 7:00 PM.      Electronically signed by Jose Vu MD at 04/05/20 1900       Consult Notes (last 24 hours) (Notes from 04/05/20 1153 through 04/06/20 1153)    No notes of this type exist for this encounter.

## 2020-04-06 NOTE — PLAN OF CARE
Covid swab sent overnight per MD order, remains afebrile.  Tachypneic overnight, remains on 6LNC with sats above 90%.  Pt with headache after nasal swab performed, prn tramadol given.  Will cont POC and cont to monitor.

## 2020-04-06 NOTE — PROGRESS NOTES
[facility]  PROGRESS NOTE    Patient Name: Alphonso Evans  : 1965  MRN: [id number]    Date of Admission: 2020  Primary Care Physician: Juan Mccarty MD    Subjective   Subjective     CC:  F/U SOA    HPI:  Patient feels better, currently sitting up on edge of bed and not SOA. On 6 liters. Sister at bedside. Denies cough. Ate lunch.    Interviewed via FaceTime with direct visualization through the window.    Review of Systems  Gen-no fevers, no chills  CV-no chest pain, no palpitations  Resp-no cough, improved dyspnea  GI-no N/V/D, no abd pain    All other systems reviewed and negative except any additional pertinent positives and negatives as discussed in HPI.       Objective   Objective     Vital Signs:   Temp:  [96.3 °F (35.7 °C)-97 °F (36.1 °C)] 96.8 °F (36 °C)  Heart Rate:  [] 88  Resp:  [20-40] 30  BP: (103-132)/(65-86) 103/71  Total (NIH Stroke Scale): 0     Physical Exam:  With patient's consent, physical exam was conducted via visual telemedicine encounter with bedside portion accommodated by nursing staff due to patient's current isolation requirements in the interest of PPE conservation.    Constitutional: No acute distress, awake, alert, nontoxic, normal body habitus, sitting on edge of bed  HENT: NCAT, MMM, no conjunctival injection  Respiratory: good effort, nonlabored respirations   Cardiovascular: tele with sinus tach  Musculoskeletal: trace BLE edema, normal muscle tone and mass for age  Psychiatric: Appropriate affect, cooperative  Neurologic: awake, alert, answers questions appropriately, movements symmetric BUE and BLE, speech clear and fluent    Results Reviewed:  Results from last 7 days   Lab Units 20  1210 20  1200 20  1159   WBC 10*3/mm3 2.68*  --   --    HEMOGLOBIN g/dL 12.2*  --   --    HEMOGLOBIN, POC g/dL  --  11.9*  --    HEMATOCRIT % 37.2*  --   --    HEMATOCRIT POC %  --  35*  --    PLATELETS  10*3/mm3 113*  --   --    INR   --   --  1.2   PROCALCITONIN ng/mL 0.41*  --   --      Results from last 7 days   Lab Units 04/05/20  1210 04/05/20  1159   SODIUM mmol/L 137  --    POTASSIUM mmol/L 4.7  --    CHLORIDE mmol/L 95*  --    CO2 mmol/L 28.0  --    BUN mg/dL 42*  --    CREATININE mg/dL 1.86* 2.00*   GLUCOSE mg/dL 109*  --    CALCIUM mg/dL 8.7  --    ALT (SGPT) U/L 44*  --    AST (SGOT) U/L 23  --    TROPONIN T ng/mL <0.010  --    PROBNP pg/mL 3,078.0*  --      Estimated Creatinine Clearance: 47 mL/min (A) (by C-G formula based on SCr of 1.86 mg/dL (H)).    Microbiology Results Abnormal     Procedure Component Value - Date/Time    Blood Culture - Blood, Arm, Right [[id number]] Collected:  04/05/20 1200    Lab Status:  Preliminary result Specimen:  Blood from Arm, Right Updated:  04/06/20 1446     Blood Culture No growth at 24 hours    SARS-CoV-2, PCR (IN-HOUSE), NP Swab in Transport Media - Swab, Nasopharynx [[id number]]  (Normal) Collected:  04/06/20 0030    Lab Status:  Final result Specimen:  Swab from Nasopharynx Updated:  04/06/20 1408     COVID19 Not Detected    Respiratory Panel, PCR - Swab, Nasopharynx [[id number]]  (Normal) Collected:  04/05/20 1527    Lab Status:  Final result Specimen:  Swab from Nasopharynx Updated:  04/05/20 1637     ADENOVIRUS, PCR Not Detected     Coronavirus 229E Not Detected     Coronavirus HKU1 Not Detected     Coronavirus NL63 Not Detected     Coronavirus OC43 Not Detected     Human Metapneumovirus Not Detected     Human Rhinovirus/Enterovirus Not Detected     Influenza B PCR Not Detected     Parainfluenza Virus 1 Not Detected     Parainfluenza Virus 2 Not Detected     Parainfluenza Virus 3 Not Detected     Parainfluenza Virus 4 Not Detected     Bordetella pertussis pcr Not Detected     Influenza A H1 2009 PCR Not Detected     Chlamydophila pneumoniae PCR Not Detected     Mycoplasma pneumo by PCR Not Detected     Influenza A PCR Not Detected     Influenza A H3 Not Detected      Influenza A H1 Not Detected     RSV, PCR Not Detected     Bordetella parapertussis PCR Not Detected    Narrative:       The coronavirus on the RVP is NOT COVID-19 and is NOT indicative of infection with COVID-19.           Imaging Results (Last 24 Hours)     Procedure Component Value Units Date/Time    CT Chest Without Contrast [[id number]] Collected:  04/05/20 1336     Updated:  04/06/20 0828    Narrative:       EXAMINATION: CT CHEST WO CONTRAST - 04/05/2020     INDICATION: Shortness of breath, generalized weakness, low O2 sats.     TECHNIQUE: Multiple axial CT imaging is obtained of the chest without  the administration of intravenous contrast.     The radiation dose reduction device was turned on for each scan per the  ALARA (As Low as Reasonably Achievable) protocol.     COMPARISON: 03/12/2020     FINDINGS: There are bilateral pleural effusions, left greater than right  and moderate to large in size. Multiple fibronodular densities seen  diffusely throughout the aerated portions of the lung fields with  consolidation seen in the perihilar regions bilaterally, all suggesting  lymphangitic spread of disease which is significantly worsened on  today's examination than when compared to the prior study. The  examination is atypical in appearance with uncommon reported features  for COVID-19 pneumonia. Alternative diagnoses should be considered such  as lymphangitic spread of disease. A superimposed pneumonia in the  perihilar regions cannot completely be excluded, however, clinical  correlation is needed. There is no mediastinal mass. Similar  lymphadenopathy identified in the mediastinum and hilar regions. Cardiac  chambers within normal limits. There is no pericardial effusion. The  upper abdomen reveals metastatic disease diffusely throughout the liver.  There is abnormal appearance of the left kidney with stranding.  Adenopathy in the left perirenal space as well as retroperitoneal  adenopathy. Visualized  spleen is homogeneous. Diverticulosis of the  colon. Pancreas is homogeneous. The bony structures reveal degenerative  changes seen within the spine.       Impression:       Bilateral pleural effusions are moderate to large in size,  left greater than right, with significant increase seen in the  lymphangitic spread of disease,  now with consolidation seen in the  perihilar regions bilaterally with multiple fibronodular densities  identified. The examination is atypical in appearance for COVID-19  pneumonia. Findings most suggestive again of lymphangitic spread of  disease. A superimposed central infiltrate cannot be completely  excluded, and clinical correlation is needed. There is extensive  metastatic disease identified within the upper abdomen.     DICTATED:   04/05/2020  EDITED/ls :   04/05/2020      This report was finalized on 4/6/2020 8:25 AM by Dr. Raissa Clemente MD.       XR Chest 1 View [[id number]] Collected:  04/05/20 1313     Updated:  04/06/20 0828    Narrative:          EXAMINATION: XR CHEST, SINGLE VIEW - 04/05/2020     INDICATION: Shortness of air, difficulty breathing. Stroke protocol.     COMPARISON: 03/02/2020     FINDINGS: Portable chest reveals worsening of the patchy airspace  disease identified within the mid and lower lung fields bilaterally with  small bilateral pleural effusions which are slightly worsened in the  interval. The heart is borderline enlarged. Degenerative changes seen  within the spine. Upper lung fields again reveal increased markings  bilaterally. Degenerative changes seen within the spine.           Impression:       Patchy airspace disease seen within the mid and lower lung  fields bilaterally with small bilateral pleural effusions. Findings have  worsened in the interval.     DICTATED:   04/05/2020  EDITED/ls :   04/05/2020      This report was finalized on 4/6/2020 8:25 AM by Dr. Raissa Clemente MD.       CT Head Without Contrast Stroke Protocol [[id number]]  Collected:  04/05/20 1202     Updated:  04/06/20 0828    Narrative:       EXAMINATION: CT HEAD WO CONTRAST  - 04/05/2020     INDICATION: Generalized weakness. Evaluate for stroke.     TECHNIQUE: Multiple axial CT imaging is obtained of the head from skull  base to skull vertex without the administration of intravenous contrast.  Stroke protocol.     The radiation dose reduction device was turned on for each scan per the  ALARA (As Low as Reasonably Achievable) protocol.     COMPARISON: 03/02/2020     FINDINGS: The brain parenchyma is unremarkable in appearance.  Low-density area identified in the right basal ganglia possibly from  prior insult. This is stable and unchanged when compared to the prior  study. No hemorrhage or hydrocephalus. No mass, mass effect or midline  shift. No abnormal extra-axial fluid collection identified. Bony  structures reveal no evidence of osseous abnormality. Visualized  paranasal sinuses are clear. The mastoid air cells are patent.       Impression:       Old insult to the right basal ganglia, small in size with no  acute intracranial abnormality identified.     Examination was performed 04/05/2020 at 11:46 AM and examination results  were given to the ER physician at the scanner by Dr. Lindsay on 04/05/2020  at 11:48 AM.     DICTATED:   04/05/2020  EDITED/ls :   04/05/2020         This report was finalized on 4/6/2020 8:25 AM by Dr. Raissa Clemente MD.             Results for orders placed during the hospital encounter of 01/29/20   Adult Transthoracic Echo Complete W/ Cont if Necessary Per Protocol    Narrative · Estimated EF = 50-55%.  · Left ventricular systolic function is low normal.  · Mild tricuspid valve regurgitation is present.  · Calculated right ventricular systolic pressure from tricuspid   regurgitation is 27 mmHg.  · The global longitudinal strain is calculated at -17 which is slightly   less than normal (normal is -20)          I have reviewed the  medications:  Scheduled Meds:  albuterol sulfate HFA 6 puff Inhalation Once   losartan 50 mg Oral Daily   sodium chloride 1,000 mL Intravenous Once   sodium chloride 10 mL Intravenous Q12H     Continuous Infusions:  Morphine 2 mg/hr     PRN Meds:.•  albuterol sulfate HFA  •  guaiFENesin-codeine  •  Morphine  •  ondansetron  •  promethazine  •  sodium chloride  •  sodium chloride  •  traMADol    Assessment/Plan   Assessment & Plan     Active Hospital Problems    Diagnosis  POA   • **Acute respiratory failure with hypoxia and hypercapnia (CMS/HCC) [J96.01, J96.02]  Yes   • Acute hypoxemic respiratory failure (CMS/HCC) [J96.01]  Yes   • CKD (chronic kidney disease) stage 3, GFR 30-59 ml/min (CMS/HCC) [N18.3]  Yes   • Malignant pleural effusion [J91.0]  Yes   • Metastatic cancer (CMS/HCC) [C79.9]  Yes   • Bone metastases (CMS/HCC) [C79.51]  Yes   • Renal cancer, left (CMS/HCC) [C64.2]  Yes   • Lumbar spine tumor [D49.2]  Yes      Resolved Hospital Problems   No resolved problems to display.        Brief Hospital Course to date:  Alphonso Evans is a 54 y.o. male with hx of metastatic renal cell carcinoma who is followed by Dr. Coffey and has recently been started on carboplatin and Gemzar, who presents to the ER due to worsening SOA over the past several days. CT chest showed recurrent left greater than right pleural effusions and progression of lymphangitic spread in the lungs. ABG showed hypoxia and hypercapnia. Admitted to 6A unit for COVID rule out, and his in house testing has returned NEGATIVE on 4/6.    Of note, patient had left thoracentesis on 3/3 with 600 cc removed, and right thoracentesis on 3/4/20 with only 10 cc removed. Cytology was positive for malignant cells.    Acute hypoxic and hypercapneic respiratory failure  Recurrent bilateral malignant pleural effusions, left greater than right  --Discussed with Dr. Millan (IR) and will plan for left PleurX catheter placement tomorrow. NPO after  midnight.  --Can give PRN Lasix based on symptoms.  --Wean O2 as tolerated.    Metastatic renal cell carcinoma  --I notified Dr. Coffey of patient's admission and plan of care today. Due to COVID-19 outbreak, do not think he needs to see patient in the hospital at this time. Can follow up in the office after discharge as needed.  --My partner previously discussed goals of care with sister Felecia on admission, agreeable to comfort measures.  --Palliative Care consulted for further goals of care discussion and transition to comfort care.    CKD 3  --Cr has fluctuated over the past month or two.  --Overall stable today.      DVT Prophylaxis:  mechanical    Disposition: I expect the patient to be discharged home ~2 days    More than 50% of time spent counseling on current illness and plan of care. Case discussed with: patient, sister, Dr. Millan, Dr. Coffey  Total time spent face to face with the patient was 25 minutes.  Total time of the encounter was 40 minutes.      CODE STATUS:   Code Status and Medical Interventions:   Ordered at: 04/05/20 1552     Level Of Support Discussed With:    Patient    Health Care Surrogate     Code Status:    No CPR     Medical Interventions (Level of Support Prior to Arrest):    Comfort Measures         Electronically signed by Niecy Gracia MD, 04/06/20, 15:22.

## 2020-04-07 NOTE — POST-PROCEDURE NOTE
Interventional Radiology Operative Note    Date: 04/07/20     Time: 11:47     Pre-op Diagnosis: Metastatic RCC. Recurrent bilaterall malignant pleural effusions.  Post-op Diagnosis: Same    Procedure: left side PleurX catheter placement    Surgeon: TONI Millan M.D.  Assistants: None    Sedation: None    Estimated Blood Loss (EBL): Trace     Urine Output (UOP): N/A (short procedure)    IVF: N/A (short procedure)    Findings: Left side pleural fluid collection    Specimens: 1000 mL serous fluid aspirated. Portion sent for laboratory analysis    Complications: No immediate    Disposition: Recovery room. Stable.  Assigned patient  or other designated patient care team member will need to complete the PleurX sheets in patient chart to order supplies to be sent to his home.  I also recommend home nursing to to teach and to perform the fist few bottle bottle connections for aspirating the pleural fluid.

## 2020-04-07 NOTE — PAYOR COMM NOTE
"Valerie Saenz RN Utilization Review [phone]  Fax # [phone]    Pt admitted on 4/5/20. Notification of admission faxed in on 4/6/20. Authorization still pending. Please call or fax with IP auth.          Farnaz Evans (54 y.o. Male)     Date of Birth Social Security Number Address Home Phone MRN    1965  [id number] [id number] [phone] [id number]    Christian Marital Status          None        Admission Date Admission Type Admitting Provider Attending Provider Department, Room/Bed    4/5/20 Emergency Elsa Olguin MD Howard, Gabriela Kirk, MD [facility] 5B, N546/1    Discharge Date Discharge Disposition Discharge Destination         Home or Self Care              Attending Provider:  Elsa Olguin MD    Allergies:  No Known Allergies    Isolation:  None   Infection:  None   Code Status:  No CPR    Ht:  170.2 cm (67\")   Wt:  86.2 kg (190 lb)    Admission Cmt:  None   Principal Problem:  Acute respiratory failure with hypoxia and hypercapnia (CMS/HCC) [J96.01,J96.02]                 Active Insurance as of 4/5/2020     Primary Coverage     Payor Plan Insurance Group Employer/Plan Group    [employer]HE[employer] MEDICAID ANTH MEDICAID [id number]     Payor Plan Address Payor Plan Phone Number Payor Plan Fax Number Effective Dates    [address] [address] [phone]  12/1/2019 - None Entered    [facility] [id number]       Subscriber Name Subscriber Birth Date Member ID       FARNAZ EVANS 1965 [id number]                 Emergency Contacts      (Rel.) Home Phone Work Phone Mobile Phone    INDIO SINGH (Sister) [phone] -- [phone]            Vital Signs (last day)     Date/Time   Temp   Temp src   Pulse   Resp   BP   Patient Position   SpO2    04/07/20 1300   --   --   --   --   --   --   (!) 88    04/07/20 1035   --   --   (!) 125   22   113/68   --   98    04/07/20 1020   --   --   (!) 128   22 "   113/71   --   95    20 1005   --   --   (!) 134   24   106/88   --   95    20 1000   --   --   (!) 135   24   114/82   --   95    20 0955   --   --   (!) 135   22   107/89   --   94    20 0950   --   --   (!) 138   22   113/82   --   95    20 0945   --   --   (!) 135   22   114/92   --   94    20 0940   --   --   (!) 126   22   124/80   --   95    20 0935   --   --   (!) 128   24   124/92   Lying   95    20 0930   --   --   (!) 125   22   120/73   Lying   95    20 0925   --   --   (!) 124   22   130/85   Sitting   95    20 0700   98.6 (37)   Oral   113   18   112/70   Sitting   96    20   98.6 (37)   --   113   18   113/61   --   95    20 1700   98 (36.7)   Oral   100   18   115/69   --   94    20 0600   --   --   88   --   --   --   95    20 0500   --   --   87   --   --   --   94    20 0415   96.8 (36)   Oral   95   (!) 30   103/71   Lying   93    20 0400   --   --   93   --   --   --   95    20 0300   --   --   99   --   --   --   92    20 0200   --   --   96   --   --   --   93    20 0100   --   --   102   --   --   --   90    20 0000   97 (36.1)   Oral   100   (!) 30   113/65   Lying   90            Niecy Gracia MD   Physician   Medicine   Progress Notes   Addendum   Date of Service:  20   Creation Time:  20            Expand All Collapse All      Show:Clear all  [x]Manual[x]Template[]Copied    Added by:  [x]Niecy Gracia MD    []Haven for details       [facility] Medicine Services  PROGRESS NOTE     Patient Name: Alphonso Evans  : 1965  MRN: [id number]     Date of Admission: 2020  Primary Care Physician: Juan Mccarty MD        Subjective      Subjective      CC:  F/U SOA     HPI:  Patient feels better, currently sitting up on edge of bed and not SOA. On 6 liters. Sister at bedside. Denies cough. Ate  lunch.     Interviewed via FaceTime with direct visualization through the window.     Review of Systems  Gen-no fevers, no chills  CV-no chest pain, no palpitations  Resp-no cough, improved dyspnea  GI-no N/V/D, no abd pain     All other systems reviewed and negative except any additional pertinent positives and negatives as discussed in HPI.           Objective      Objective      Vital Signs:   Temp:  [96.3 °F (35.7 °C)-97 °F (36.1 °C)] 96.8 °F (36 °C)  Heart Rate:  [] 88  Resp:  [20-40] 30  BP: (103-132)/(65-86) 103/71  Total (NIH Stroke Scale): 0     Physical Exam:  With patient's consent, physical exam was conducted via visual telemedicine encounter with bedside portion accommodated by nursing staff due to patient's current isolation requirements in the interest of PPE conservation.     Constitutional: No acute distress, awake, alert, nontoxic, normal body habitus, sitting on edge of bed  HENT: NCAT, MMM, no conjunctival injection  Respiratory: good effort, nonlabored respirations   Cardiovascular: tele with sinus tach  Musculoskeletal: trace BLE edema, normal muscle tone and mass for age  Psychiatric: Appropriate affect, cooperative  Neurologic: awake, alert, answers questions appropriately, movements symmetric BUE and BLE, speech clear and fluent     Results Reviewed:         Results from last 7 days   Lab Units 04/05/20  1210 04/05/20  1200 04/05/20  1159   WBC 10*3/mm3 2.68*  --   --    HEMOGLOBIN g/dL 12.2*  --   --    HEMOGLOBIN, POC g/dL  --  11.9*  --    HEMATOCRIT % 37.2*  --   --    HEMATOCRIT POC %  --  35*  --    PLATELETS 10*3/mm3 113*  --   --    INR    --   --  1.2   PROCALCITONIN ng/mL 0.41*  --   --             Results from last 7 days   Lab Units 04/05/20  1210 04/05/20  1159   SODIUM mmol/L 137  --    POTASSIUM mmol/L 4.7  --    CHLORIDE mmol/L 95*  --    CO2 mmol/L 28.0  --    BUN mg/dL 42*  --    CREATININE mg/dL 1.86* 2.00*   GLUCOSE mg/dL 109*  --    CALCIUM mg/dL 8.7  --    ALT  (SGPT) U/L 44*  --    AST (SGOT) U/L 23  --    TROPONIN T ng/mL <0.010  --    PROBNP pg/mL 3,078.0*  --       Estimated Creatinine Clearance: 47 mL/min (A) (by C-G formula based on SCr of 1.86 mg/dL (H)).              Microbiology Results Abnormal      Procedure Component Value - Date/Time     Blood Culture - Blood, Arm, Right [[id number]] Collected:  04/05/20 1200     Lab Status:  Preliminary result Specimen:  Blood from Arm, Right Updated:  04/06/20 1446       Blood Culture No growth at 24 hours     SARS-CoV-2, PCR (IN-HOUSE), NP Swab in Transport Media - Swab, Nasopharynx [[id number]]  (Normal) Collected:  04/06/20 0030     Lab Status:  Final result Specimen:  Swab from Nasopharynx Updated:  04/06/20 1408       COVID19 Not Detected     Respiratory Panel, PCR - Swab, Nasopharynx [[id number]]  (Normal) Collected:  04/05/20 1527     Lab Status:  Final result Specimen:  Swab from Nasopharynx Updated:  04/05/20 1637       ADENOVIRUS, PCR Not Detected       Coronavirus 229E Not Detected       Coronavirus HKU1 Not Detected       Coronavirus NL63 Not Detected       Coronavirus OC43 Not Detected       Human Metapneumovirus Not Detected       Human Rhinovirus/Enterovirus Not Detected       Influenza B PCR Not Detected       Parainfluenza Virus 1 Not Detected       Parainfluenza Virus 2 Not Detected       Parainfluenza Virus 3 Not Detected       Parainfluenza Virus 4 Not Detected       Bordetella pertussis pcr Not Detected       Influenza A H1 2009 PCR Not Detected       Chlamydophila pneumoniae PCR Not Detected       Mycoplasma pneumo by PCR Not Detected       Influenza A PCR Not Detected       Influenza A H3 Not Detected       Influenza A H1 Not Detected       RSV, PCR Not Detected       Bordetella parapertussis PCR Not Detected     Narrative:        The coronavirus on the RVP is NOT COVID-19 and is NOT indicative of infection with COVID-19.                       Imaging Results (Last 24 Hours)      Procedure Component Value  Units Date/Time     CT Chest Without Contrast [[id number]] Collected:  04/05/20 1336       Updated:  04/06/20 0828     Narrative:        EXAMINATION: CT CHEST WO CONTRAST - 04/05/2020     INDICATION: Shortness of breath, generalized weakness, low O2 sats.     TECHNIQUE: Multiple axial CT imaging is obtained of the chest without  the administration of intravenous contrast.     The radiation dose reduction device was turned on for each scan per the  ALARA (As Low as Reasonably Achievable) protocol.     COMPARISON: 03/12/2020     FINDINGS: There are bilateral pleural effusions, left greater than right  and moderate to large in size. Multiple fibronodular densities seen  diffusely throughout the aerated portions of the lung fields with  consolidation seen in the perihilar regions bilaterally, all suggesting  lymphangitic spread of disease which is significantly worsened on  today's examination than when compared to the prior study. The  examination is atypical in appearance with uncommon reported features  for COVID-19 pneumonia. Alternative diagnoses should be considered such  as lymphangitic spread of disease. A superimposed pneumonia in the  perihilar regions cannot completely be excluded, however, clinical  correlation is needed. There is no mediastinal mass. Similar  lymphadenopathy identified in the mediastinum and hilar regions. Cardiac  chambers within normal limits. There is no pericardial effusion. The  upper abdomen reveals metastatic disease diffusely throughout the liver.  There is abnormal appearance of the left kidney with stranding.  Adenopathy in the left perirenal space as well as retroperitoneal  adenopathy. Visualized spleen is homogeneous. Diverticulosis of the  colon. Pancreas is homogeneous. The bony structures reveal degenerative  changes seen within the spine.        Impression:        Bilateral pleural effusions are moderate to large in size,  left greater than right, with significant increase  seen in the  lymphangitic spread of disease,  now with consolidation seen in the  perihilar regions bilaterally with multiple fibronodular densities  identified. The examination is atypical in appearance for COVID-19  pneumonia. Findings most suggestive again of lymphangitic spread of  disease. A superimposed central infiltrate cannot be completely  excluded, and clinical correlation is needed. There is extensive  metastatic disease identified within the upper abdomen.     DICTATED:   04/05/2020  EDITED/ls :   04/05/2020      This report was finalized on 4/6/2020 8:25 AM by Dr. Raissa Clemente MD.        XR Chest 1 View [[id number]] Collected:  04/05/20 1313       Updated:  04/06/20 0828     Narrative:           EXAMINATION: XR CHEST, SINGLE VIEW - 04/05/2020     INDICATION: Shortness of air, difficulty breathing. Stroke protocol.     COMPARISON: 03/02/2020     FINDINGS: Portable chest reveals worsening of the patchy airspace  disease identified within the mid and lower lung fields bilaterally with  small bilateral pleural effusions which are slightly worsened in the  interval. The heart is borderline enlarged. Degenerative changes seen  within the spine. Upper lung fields again reveal increased markings  bilaterally. Degenerative changes seen within the spine.            Impression:        Patchy airspace disease seen within the mid and lower lung  fields bilaterally with small bilateral pleural effusions. Findings have  worsened in the interval.     DICTATED:   04/05/2020  EDITED/ls :   04/05/2020      This report was finalized on 4/6/2020 8:25 AM by Dr. Raissa Clemente MD.        CT Head Without Contrast Stroke Protocol [[id number]] Collected:  04/05/20 1202       Updated:  04/06/20 0828     Narrative:        EXAMINATION: CT HEAD WO CONTRAST  - 04/05/2020     INDICATION: Generalized weakness. Evaluate for stroke.     TECHNIQUE: Multiple axial CT imaging is obtained of the head from skull  base to skull  vertex without the administration of intravenous contrast.  Stroke protocol.     The radiation dose reduction device was turned on for each scan per the  ALARA (As Low as Reasonably Achievable) protocol.     COMPARISON: 03/02/2020     FINDINGS: The brain parenchyma is unremarkable in appearance.  Low-density area identified in the right basal ganglia possibly from  prior insult. This is stable and unchanged when compared to the prior  study. No hemorrhage or hydrocephalus. No mass, mass effect or midline  shift. No abnormal extra-axial fluid collection identified. Bony  structures reveal no evidence of osseous abnormality. Visualized  paranasal sinuses are clear. The mastoid air cells are patent.        Impression:        Old insult to the right basal ganglia, small in size with no  acute intracranial abnormality identified.     Examination was performed 04/05/2020 at 11:46 AM and examination results  were given to the ER physician at the scanner by Dr. Lindsay on 04/05/2020  at 11:48 AM.     DICTATED:   04/05/2020  EDITED/ls :   04/05/2020         This report was finalized on 4/6/2020 8:25 AM by Dr. Raissa Clemente MD.                     Results for orders placed during the hospital encounter of 01/29/20   Adult Transthoracic Echo Complete W/ Cont if Necessary Per Protocol     Narrative · Estimated EF = 50-55%.  · Left ventricular systolic function is low normal.  · Mild tricuspid valve regurgitation is present.  · Calculated right ventricular systolic pressure from tricuspid   regurgitation is 27 mmHg.  · The global longitudinal strain is calculated at -17 which is slightly   less than normal (normal is -20)            I have reviewed the medications:  Scheduled Meds:  albuterol sulfate HFA 6 puff Inhalation Once   losartan 50 mg Oral Daily   sodium chloride 1,000 mL Intravenous Once   sodium chloride 10 mL Intravenous Q12H      Continuous Infusions:  Morphine 2 mg/hr      PRN Meds:.•  albuterol sulfate HFA  •   guaiFENesin-codeine  •  Morphine  •  ondansetron  •  promethazine  •  sodium chloride  •  sodium chloride  •  traMADol        Assessment/Plan      Assessment & Plan            Active Hospital Problems     Diagnosis   POA   • **Acute respiratory failure with hypoxia and hypercapnia (CMS/HCC) [J96.01, J96.02]   Yes   • Acute hypoxemic respiratory failure (CMS/HCC) [J96.01]   Yes   • CKD (chronic kidney disease) stage 3, GFR 30-59 ml/min (CMS/HCC) [N18.3]   Yes   • Malignant pleural effusion [J91.0]   Yes   • Metastatic cancer (CMS/HCC) [C79.9]   Yes   • Bone metastases (CMS/HCC) [C79.51]   Yes   • Renal cancer, left (CMS/HCC) [C64.2]   Yes   • Lumbar spine tumor [D49.2]   Yes       Resolved Hospital Problems   No resolved problems to display.         Brief Hospital Course to date:  Alphonso Evans is a 54 y.o. male with hx of metastatic renal cell carcinoma who is followed by Dr. Coffey and has recently been started on carboplatin and Gemzar, who presents to the ER due to worsening SOA over the past several days. CT chest showed recurrent left greater than right pleural effusions and progression of lymphangitic spread in the lungs. ABG showed hypoxia and hypercapnia. Admitted to 6A unit for COVID rule out, and his in house testing has returned NEGATIVE on 4/6.     Of note, patient had left thoracentesis on 3/3 with 600 cc removed, and right thoracentesis on 3/4/20 with only 10 cc removed. Cytology was positive for malignant cells.     Acute hypoxic and hypercapneic respiratory failure  Recurrent bilateral malignant pleural effusions, left greater than right  --Discussed with Dr. Millan (IR) and will plan for left PleurX catheter placement tomorrow. NPO after midnight.  --Can give PRN Lasix based on symptoms.  --Wean O2 as tolerated.     Metastatic renal cell carcinoma  --I notified Dr. Coffey of patient's admission and plan of care today. Due to COVID-19 outbreak, do not think he needs to see patient in the  hospital at this time. Can follow up in the office after discharge as needed.  --My partner previously discussed goals of care with sister Felecia on admission, agreeable to comfort measures.  --Palliative Care consulted for further goals of care discussion and transition to comfort care.     CKD 3  --Cr has fluctuated over the past month or two.  --Overall stable today.        DVT Prophylaxis:  mechanical     Disposition: I expect the patient to be discharged home ~2 days     More than 50% of time spent counseling on current illness and plan of care. Case discussed with: patient, sister, Dr. Millan, Dr. Coffey  Total time spent face to face with the patient was 25 minutes.  Total time of the encounter was 40 minutes.        CODE STATUS:       Code Status and Medical Interventions:   Ordered at: 04/05/20 1552     Level Of Support Discussed With:     Patient     Health Care Surrogate     Code Status:     No CPR     Medical Interventions (Level of Support Prior to Arrest):     Comfort Measures            Electronically signed by Neicy Gracia MD, 04/06/20, 15:22.                 Kerry Moreno, RN   Registered Nurse   Radiology   Nursing Note   Signed   Date of Service:  04/07/20 1050   Creation Time:  04/07/20 1053            Signed             15.5 Fr Left PleurX Catheter placed by Dr Millan in IR.  Pt tolerated well with moderate sedation.  1 liter fluid removed with samples sent to lab per Dr Olguin.  Report called to Tanya on 5B.  Pt returned to room with Janessa in transport.

## 2020-04-07 NOTE — DISCHARGE SUMMARY
[facility] Medicine Services  DISCHARGE SUMMARY    Patient Name: Alphonso Evans  : 1965  MRN: [id number]    Date of Admission: 2020 11:47 AM  Date of Discharge:  2020  Primary Care Physician: Juan Mccarty MD    Consults     Date and Time Order Name Status Description    2020 1456 Inpatient Palliative Care MD Consult Completed           Hospital Course     Presenting Problem:   Acute hypoxemic respiratory failure (CMS/HCC) [J96.01]  Acute respiratory failure with hypoxia and hypercapnia (CMS/HCC) [J96.01, J96.02]    Active Hospital Problems    Diagnosis  POA   • **Acute respiratory failure with hypoxia and hypercapnia (CMS/HCC) [J96.01, J96.02]  Yes   • Acute hypoxemic respiratory failure (CMS/HCC) [J96.01]  Yes   • CKD (chronic kidney disease) stage 3, GFR 30-59 ml/min (CMS/HCC) [N18.3]  Yes   • Malignant pleural effusion [J91.0]  Yes   • Metastatic cancer (CMS/HCC) [C79.9]  Yes   • Bone metastases (CMS/HCC) [C79.51]  Yes   • Renal cancer, left (CMS/HCC) [C64.2]  Yes   • Lumbar spine tumor [D49.2]  Yes      Resolved Hospital Problems   No resolved problems to display.          Hospital Course:  Alphonso Evans is a 54 y.o. male with hx of metastatic renal cell carcinoma who is followed by Dr. Coffey and has recently been started on carboplatin and Gemzar, who presents to the ER due to worsening SOA over the past several days. CT chest showed recurrent left greater than right pleural effusions and progression of lymphangitic spread in the lungs. ABG showed hypoxia and hypercapnia. Admitted to 6A unit for COVID rule out, and his in house testing has returned NEGATIVE on .     Of note, patient had left thoracentesis on 3/3 with 600 cc removed, and right thoracentesis on 3/4/20 with only 10 cc removed. Cytology was positive for malignant cells.     Acute hypoxic and hypercapneic respiratory failure  Recurrent bilateral malignant pleural effusions, left  greater than right  --s/p palliative pleurx catheter placement by IR today.   --Can give PRN Lasix based on symptoms.  --Wean O2 as tolerated, will arrange for home O2 as pt still on 5-6L. Discussed with pt's sister, Felecia.     Metastatic renal cell carcinoma  --Dr. Coffey aware of pt's inpatient status. Due to COVID-19 outbreak, do not think he needs to see patient in the hospital at this time. Can follow up in the office after discharge as needed.  --My partner previously discussed goals of care with sister Fleecia on admission, agreeable to comfort measures. I discussed again with Felecia today.   --Palliative Care consulted for further goals of care discussion and transition to comfort care. Pt to follow up with Palliative Care as outpatient (Dr. Pearce).      CKD 3  --Cr has fluctuated over the past month or two.  --stable based on labs yesterday         Discharge Follow Up Recommendations for outpatient labs/diagnostics:   1. With PCP within a week or PRN  2. With Palliative Care within a week  3. With Dr. Coffey as previously arranged    Day of Discharge     HPI:   Pt doing well today after Pleurx placement/thoracentesis. Had 1L removed from left side.  Pt is slightly uncomfortable and requesting pain medication currently. Still on 6L O2 BNC.     Review of Systems  Gen- No fevers, chills  CV- No chest pain, palpitations  Resp- still SOA, mild cough post procedure  GI- No N/V/D, abd pain      Vital Signs:   Temp:  [98 °F (36.7 °C)-98.6 °F (37 °C)] 98.6 °F (37 °C)  Heart Rate:  [100-138] 125  Resp:  [18-24] 22  BP: (106-130)/(61-92) 113/68     Physical Exam:  Constitutional: mild distress due to SOA and pain, awake, alert  HENT: NCAT, mucous membranes moist  Respiratory: Clear bilaterally, left side pleurx catheter in  place  Cardiovascular: RRR, no murmurs, rubs, or gallops, palpable pedal pulses bilaterally  Gastrointestinal: Positive bowel sounds, soft, nontender, nondistended  Musculoskeletal: No bilateral  ankle edema  Psychiatric: Appropriate affect, cooperative  Neurologic: Oriented x 3, strength symmetric in all extremities, Cranial Nerves grossly intact to confrontation, speech clear  Skin: No rashes    Pertinent  and/or Most Recent Results     Results from last 7 days   Lab Units 04/05/20  1210 04/05/20  1200 04/05/20  1159   WBC 10*3/mm3 2.68*  --   --    HEMOGLOBIN g/dL 12.2*  --   --    HEMOGLOBIN, POC g/dL  --  11.9*  --    HEMATOCRIT % 37.2*  --   --    HEMATOCRIT POC %  --  35*  --    PLATELETS 10*3/mm3 113*  --   --    SODIUM mmol/L 137  --   --    POTASSIUM mmol/L 4.7  --   --    CHLORIDE mmol/L 95*  --   --    CO2 mmol/L 28.0  --   --    BUN mg/dL 42*  --   --    CREATININE mg/dL 1.86*  --  2.00*   GLUCOSE mg/dL 109*  --   --    CALCIUM mg/dL 8.7  --   --      Results from last 7 days   Lab Units 04/05/20  1210 04/05/20  1159   BILIRUBIN mg/dL 0.4  --    ALK PHOS U/L 80  --    ALT (SGPT) U/L 44*  --    AST (SGOT) U/L 23  --    PROTIME seconds  --  14.3   INR   --  1.2   APTT seconds 25.6  --            Invalid input(s): TG, LDLCALC, LDLREALC  Results from last 7 days   Lab Units 04/05/20  1621 04/05/20  1213 04/05/20  1210   PROBNP pg/mL  --   --  3,078.0*   TROPONIN T ng/mL  --   --  <0.010   PROCALCITONIN ng/mL  --   --  0.41*   LACTATE mmol/L 1.8 2.1*  --        Brief Urine Lab Results  (Last result in the past 365 days)      Color   Clarity   Blood   Leuk Est   Nitrite   Protein   CREAT   Urine HCG        04/05/20 1527 Yellow Clear Negative Negative Negative 30 mg/dL (1+)               Microbiology Results Abnormal     Procedure Component Value - Date/Time    Blood Culture - Blood, Arm, Left [[id number]] Collected:  04/05/20 1400    Lab Status:  Preliminary result Specimen:  Blood from Arm, Left Updated:  04/07/20 1501     Blood Culture No growth at 2 days    Blood Culture - Blood, Arm, Right [[id number]] Collected:  04/05/20 1200    Lab Status:  Preliminary result Specimen:  Blood from Arm, Right  Updated:  04/07/20 1446     Blood Culture No growth at 2 days    Body Fluid Culture - Body Fluid, Pleural Cavity [[id number]] Collected:  04/07/20 1037    Lab Status:  Preliminary result Specimen:  Body Fluid from Pleural Cavity Updated:  04/07/20 1332     Gram Stain Few (2+) Red blood cells      Occasional WBCs seen      No organisms seen    SARS-CoV-2, PCR (IN-HOUSE), NP Swab in Transport Media - Swab, Nasopharynx [[id number]]  (Normal) Collected:  04/06/20 0030    Lab Status:  Final result Specimen:  Swab from Nasopharynx Updated:  04/06/20 1408     COVID19 Not Detected    Respiratory Panel, PCR - Swab, Nasopharynx [[id number]]  (Normal) Collected:  04/05/20 1527    Lab Status:  Final result Specimen:  Swab from Nasopharynx Updated:  04/05/20 1637     ADENOVIRUS, PCR Not Detected     Coronavirus 229E Not Detected     Coronavirus HKU1 Not Detected     Coronavirus NL63 Not Detected     Coronavirus OC43 Not Detected     Human Metapneumovirus Not Detected     Human Rhinovirus/Enterovirus Not Detected     Influenza B PCR Not Detected     Parainfluenza Virus 1 Not Detected     Parainfluenza Virus 2 Not Detected     Parainfluenza Virus 3 Not Detected     Parainfluenza Virus 4 Not Detected     Bordetella pertussis pcr Not Detected     Influenza A H1 2009 PCR Not Detected     Chlamydophila pneumoniae PCR Not Detected     Mycoplasma pneumo by PCR Not Detected     Influenza A PCR Not Detected     Influenza A H3 Not Detected     Influenza A H1 Not Detected     RSV, PCR Not Detected     Bordetella parapertussis PCR Not Detected    Narrative:       The coronavirus on the RVP is NOT COVID-19 and is NOT indicative of infection with COVID-19.           Imaging Results (All)     Procedure Component Value Units Date/Time    IR Pleural Drain Plcmt [[id number]] Resulted:  04/07/20 0912     Updated:  04/07/20 0912    CT Chest Without Contrast [[id number]] Collected:  04/05/20 1336     Updated:  04/06/20 0828    Narrative:        EXAMINATION: CT CHEST WO CONTRAST - 04/05/2020     INDICATION: Shortness of breath, generalized weakness, low O2 sats.     TECHNIQUE: Multiple axial CT imaging is obtained of the chest without  the administration of intravenous contrast.     The radiation dose reduction device was turned on for each scan per the  ALARA (As Low as Reasonably Achievable) protocol.     COMPARISON: 03/12/2020     FINDINGS: There are bilateral pleural effusions, left greater than right  and moderate to large in size. Multiple fibronodular densities seen  diffusely throughout the aerated portions of the lung fields with  consolidation seen in the perihilar regions bilaterally, all suggesting  lymphangitic spread of disease which is significantly worsened on  today's examination than when compared to the prior study. The  examination is atypical in appearance with uncommon reported features  for COVID-19 pneumonia. Alternative diagnoses should be considered such  as lymphangitic spread of disease. A superimposed pneumonia in the  perihilar regions cannot completely be excluded, however, clinical  correlation is needed. There is no mediastinal mass. Similar  lymphadenopathy identified in the mediastinum and hilar regions. Cardiac  chambers within normal limits. There is no pericardial effusion. The  upper abdomen reveals metastatic disease diffusely throughout the liver.  There is abnormal appearance of the left kidney with stranding.  Adenopathy in the left perirenal space as well as retroperitoneal  adenopathy. Visualized spleen is homogeneous. Diverticulosis of the  colon. Pancreas is homogeneous. The bony structures reveal degenerative  changes seen within the spine.       Impression:       Bilateral pleural effusions are moderate to large in size,  left greater than right, with significant increase seen in the  lymphangitic spread of disease,  now with consolidation seen in the  perihilar regions bilaterally with multiple fibronodular  densities  identified. The examination is atypical in appearance for COVID-19  pneumonia. Findings most suggestive again of lymphangitic spread of  disease. A superimposed central infiltrate cannot be completely  excluded, and clinical correlation is needed. There is extensive  metastatic disease identified within the upper abdomen.     DICTATED:   04/05/2020  EDITED/ls :   04/05/2020      This report was finalized on 4/6/2020 8:25 AM by Dr. Raissa Clemente MD.       XR Chest 1 View [[id number]] Collected:  04/05/20 1313     Updated:  04/06/20 0828    Narrative:          EXAMINATION: XR CHEST, SINGLE VIEW - 04/05/2020     INDICATION: Shortness of air, difficulty breathing. Stroke protocol.     COMPARISON: 03/02/2020     FINDINGS: Portable chest reveals worsening of the patchy airspace  disease identified within the mid and lower lung fields bilaterally with  small bilateral pleural effusions which are slightly worsened in the  interval. The heart is borderline enlarged. Degenerative changes seen  within the spine. Upper lung fields again reveal increased markings  bilaterally. Degenerative changes seen within the spine.           Impression:       Patchy airspace disease seen within the mid and lower lung  fields bilaterally with small bilateral pleural effusions. Findings have  worsened in the interval.     DICTATED:   04/05/2020  EDITED/ls :   04/05/2020      This report was finalized on 4/6/2020 8:25 AM by Dr. Raissa Clemente MD.       CT Head Without Contrast Stroke Protocol [[id number]] Collected:  04/05/20 1202     Updated:  04/06/20 0828    Narrative:       EXAMINATION: CT HEAD WO CONTRAST  - 04/05/2020     INDICATION: Generalized weakness. Evaluate for stroke.     TECHNIQUE: Multiple axial CT imaging is obtained of the head from skull  base to skull vertex without the administration of intravenous contrast.  Stroke protocol.     The radiation dose reduction device was turned on for each scan per  the  ALARA (As Low as Reasonably Achievable) protocol.     COMPARISON: 03/02/2020     FINDINGS: The brain parenchyma is unremarkable in appearance.  Low-density area identified in the right basal ganglia possibly from  prior insult. This is stable and unchanged when compared to the prior  study. No hemorrhage or hydrocephalus. No mass, mass effect or midline  shift. No abnormal extra-axial fluid collection identified. Bony  structures reveal no evidence of osseous abnormality. Visualized  paranasal sinuses are clear. The mastoid air cells are patent.       Impression:       Old insult to the right basal ganglia, small in size with no  acute intracranial abnormality identified.     Examination was performed 04/05/2020 at 11:46 AM and examination results  were given to the ER physician at the scanner by Dr. Lindsay on 04/05/2020  at 11:48 AM.     DICTATED:   04/05/2020  EDITED/ls :   04/05/2020         This report was finalized on 4/6/2020 8:25 AM by Dr. Raissa Clemente MD.                       Results for orders placed during the hospital encounter of 01/29/20   Adult Transthoracic Echo Complete W/ Cont if Necessary Per Protocol    Narrative · Estimated EF = 50-55%.  · Left ventricular systolic function is low normal.  · Mild tricuspid valve regurgitation is present.  · Calculated right ventricular systolic pressure from tricuspid   regurgitation is 27 mmHg.  · The global longitudinal strain is calculated at -17 which is slightly   less than normal (normal is -20)          Plan for Follow-up of Pending Labs/Results:    Order Current Status    Non-gynecologic Cytology In process    Blood Culture - Blood, Arm, Left Preliminary result    Blood Culture - Blood, Arm, Right Preliminary result    Body Fluid Culture - Body Fluid, Pleural Cavity Preliminary result        Discharge Details        Discharge Medications      New Medications      Instructions Start Date   oxyCODONE 5 MG immediate release tablet  Commonly known  as:  ROXICODONE   2.5 mg, Oral, Every 4 Hours While Awake      oxyCODONE 5 MG immediate release tablet  Commonly known as:  ROXICODONE   5 mg, Oral, Every 4 Hours PRN         Changes to Medications      Instructions Start Date   doxazosin 8 MG tablet  Commonly known as:  Cardura  What changed:    · how much to take  · when to take this  · additional instructions   2 tabs po daily         Continue These Medications      Instructions Start Date   albuterol sulfate  (90 Base) MCG/ACT inhaler  Commonly known as:  PROVENTIL HFA;VENTOLIN HFA;PROAIR HFA   2 puffs, Inhalation, Every 4 Hours PRN      dexamethasone 4 MG tablet  Commonly known as:  DECADRON   Take 2 tablets in the morning daily on days 2, 3 & 4.  Take with food.      diclofenac 1 % gel gel  Commonly known as:  VOLTAREN   4 g, Topical, 4 Times Daily PRN      guaiFENesin-Codeine 100-6.3 MG/5ML solution   10 mL, Oral, 3 Times Daily PRN      hydroCHLOROthiazide 25 MG tablet  Commonly known as:  HYDRODIURIL   25 mg, Oral, Daily      losartan 50 MG tablet  Commonly known as:  COZAAR   50 mg, Oral, Daily      metoprolol succinate XL 50 MG 24 hr tablet  Commonly known as:  TOPROL-XL   50 mg, Daily      ondansetron 8 MG tablet  Commonly known as:  ZOFRAN   8 mg, Oral, 3 Times Daily PRN      pantoprazole 40 MG EC tablet  Commonly known as:  PROTONIX   40 mg, Oral, Daily      sennosides-docusate 8.6-50 MG per tablet  Commonly known as:  PERICOLACE   2 tablets, Oral, Daily      traMADol 50 MG tablet  Commonly known as:  ULTRAM   50 mg, Oral, Every 6 Hours PRN             No Known Allergies      Discharge Disposition:  Home or Self Care    Diet:  Hospital:  Diet Order   Procedures   • Diet Regular       Activity:      Restrictions or Other Recommendations:         CODE STATUS:    Code Status and Medical Interventions:   Ordered at: 04/05/20 3967     Level Of Support Discussed With:    Patient    Health Care Surrogate     Code Status:    No CPR     Medical  Interventions (Level of Support Prior to Arrest):    Comfort Measures       Future Appointments   Date Time Provider Department Center   4/10/2020  8:30 AM Jose Coffey MD [facility]   4/10/2020 10:00 AM ROOM B [facility]   4/14/2020  3:30 PM Vicenta Pearce MD [facility] None   8/27/2020  1:00 PM Payal Orantes APRN [facility] None       Additional Instructions for the Follow-ups that You Need to Schedule     Ambulatory Referral to Home Health   As directed      Face to Face Visit Date:  4/7/2020    Follow-up provider for Plan of Care?:  I treated the patient in an acute care facility and will not continue treatment after discharge.    Follow-up provider:  JUAN MAGAÑA [[id number]]    Reason/Clinical Findings:  acute respiratory failure    Describe mobility limitations that make leaving home difficult:  impaired functional mobility, balance, gait and endurance    Nursing/Therapeutic Services Requested:  Skilled Nursing    Skilled nursing orders:  O2 instruction (Pleurx teaching) Cardiopulmonary assessments Neurovascular assessments Other         Discharge Follow-up with PCP   As directed       Currently Documented PCP:    Juan Magaña MD    PCP Phone Number:    [phone]     Follow Up Details:  in one week with PCP         Discharge Follow-up with Specified Provider: Palliative outpatient; 1 Week   As directed      To:  Palliative outpatient    Follow Up:  1 Week               Time Spent on Discharge:  35 minutes    Electronically signed by Elsa Olguin MD, 04/07/20, 11:55 AM.

## 2020-04-07 NOTE — PLAN OF CARE
Problem: Patient Care Overview  Goal: Interprofessional Rounds/Family Conf  Outcome: Ongoing (interventions implemented as appropriate)  Flowsheets (Taken 4/7/2020 1508)  Summary: 1300 Palliative Team Conference: LUISA Yun RN, PN; TRISHA Pierson DO; KATE Gloria, RN, CHPN; LENNY Donald  Note:   Palliative consult for GOC/ACP, symptom management. Pt seen upon return to room from IR for PleurX placement; reported pain at insertion site 6/10 at 10 minutes after received dose of Tramadol. BEAN GALVEZ ordered Oxy IR for management of pain. Review of chart this afternoon indicates plan for discharge home today, follow up with Palliative Clinic (has been seen there recently). Message sent to pt's primary RN and to Dr. Olguin regarding pain medication at home or whether needing a prescription at discharge.     Problem: Palliative Care (Adult)  Goal: Maximized Comfort  Outcome: Ongoing (interventions implemented as appropriate)  Flowsheets (Taken 4/7/2020 1506)  Maximized Comfort: making progress toward outcome  Goal: Enhanced Quality of Life  Outcome: Ongoing (interventions implemented as appropriate)  Flowsheets (Taken 4/7/2020 1506)  Enhanced Quality of Life: making progress toward outcome     Problem: Palliative Care (Adult)  Goal: Identify Related Risk Factors and Signs and Symptoms  Outcome: Outcome(s) achieved  Flowsheets (Taken 4/7/2020 1506)  Palliative Care: Related Risk Factors: terminal illness;worsening symptoms  Palliative Care: Signs and Symptoms: anxiety;breathlessness;fatigue;pain     Problem: Palliative Care (Adult)  Intervention: Minimize Discomfort  Flowsheets (Taken 4/7/2020 1506)  Pain Management Interventions: pain management plan reviewed with patient/caregiver;other (see comments) (PO pain medication added)  Intervention: Promote Informed Decision Making and Goal Setting  Flowsheets (Taken 4/7/2020 1506)  Life Transition/Adjustment: palliative care discussed;other (see comments) (sees Dr. Pearce,  Palliative Clinic)  Intervention: Support/Optimize Psychosocial Response  Flowsheets (Taken 4/7/2020 6454)  Spiritual Activities Assistance: other (see comments) (Spiritual Care consult)  Family/Support System Care: caregiver stress acknowledged;involvement promoted

## 2020-04-07 NOTE — DISCHARGE PLACEMENT REQUEST
"Farnaz Evans (54 y.o. Male)     Shahnaz Figueroa RN  [phone]    Date of Birth Social Security Number Address Home Phone MRN    1965  [address] [id number] [phone] [id number]    Hinduism Marital Status          None        Admission Date Admission Type Admitting Provider Attending Provider Department, Room/Bed    20 Emergency Elsa Olguin MD Howard, Gabriela Kirk, MD [address], [address]1    Discharge Date Discharge Disposition Discharge Destination         Home or Self Care              Attending Provider:  Elsa Olguin MD    Allergies:  No Known Allergies    Isolation:  None   Infection:  None   Code Status:  No CPR    Ht:  170.2 cm (67\")   Wt:  86.2 kg (190 lb)    Admission Cmt:  None   Principal Problem:  Acute respiratory failure with hypoxia and hypercapnia (CMS/HCC) [J96.01,J96.02]                 Active Insurance as of 2020     Primary Coverage     Payor Plan Insurance Group Employer/Plan Group    [place]HE[place] MEDICAID [facility] MEDICAID [id number]     Payor Plan Address Payor Plan Phone Number Payor Plan Fax Number Effective Dates    [address] [address] [phone]  2019 - None Entered    [facility] [address]       Subscriber Name Subscriber Birth Date Member ID       FARNAZ EVANS 1965 [id number]                 Emergency Contacts      (Rel.) Home Phone Work Phone Mobile Phone    INDIO SINGH (Sister) [phone] -- [phone]        [address] [address]  Dept. Phone:  [phone]  Dept. Fax:   Date Ordered: 2020         Patient:  Farnaz Evans MRN:  [id number]   [address] [id number] :  1965  SSN:    Phone: [phone] Sex:  M     Weight: 86.2 kg (190 lb)         Ht Readings from Last 1 Encounters:   20 170.2 cm (67\")         Oxygen Therapy         (Order " ID: [id number])    Diagnosis:  Hypoxia (R09.02 [ICD-10-CM] 799.02 [ICD-9-CM])  Metastatic cancer (CMS/HCC) (C79.9 [ICD-10-CM] 199.1 [ICD-9-CM])   Quantity:  1     Delivery Modality: Nasal Cannula  Liters Per Minute: 6  Duration: Continuous  Equipment:  Oxygen Concentrator &  &  Portable Gaseous Oxygen System & Portable Oxygen Contents Gaseous &  Conserving Regulator  Length of Need (99 Months = Lifetime): 99 Months = Lifetime        Authorizing Provider's Phone: [phone]   Verbal Order Mode: Telephone with readback   Authorizing Provider: Elsa Olguin MD  Authorizing Provider's NPI: [id number]     Order Entered By: Shahnaz Figueroa RN 2020  1:14 PM     Electronically signed by:  Elsa Olguin MD        20 1300  --  --  --  --  room air  --  88Abnormal             History & Physical      Joseph Millan MD at 20 1146          H&P reviewed. The patient was examined and there are no changes to the H&P.          Electronically signed by Joseph Millan MD at 20 1146   Source Note              [facility] Medicine Services  HISTORY AND PHYSICAL    Patient Name: Alphonso Evans  : 1965  MRN: [id number]  Primary Care Physician: Juan Mccarty MD  Date of admission: 2020      Subjective   Subjective     Chief Complaint:  Shortness of breath    HPI:  Alphonso Evans is a 54 y.o. male with a history of metastatic renal cell carcinoma initially diagnosed in 2019.  Is found to be widely metastatic including bones, liver, recent lymphangitic spread in the lungs.  Followed by Dr. Coffey and started on Keytruda and axitinib in January.  Had disease progression and most recently started on carboplatin and Gemzar.  Presents to the emergency room with progressive shortness of breath.  Has no malignant pleural effusions and last tapped on 3/4.  Patient is currently a poor historian.  I did discuss with his  sister who is a nurse practitioner who states that his breathing has gotten worse over the last few days.  Normally does not wear oxygen at baseline.  He denies any fevers.  Positive nonproductive cough.  He has no recent travel, mostly stays inside, has no known COVID exposures.  Work-up in the emergency room included a CT of his chest which showed recurrent left greater than right pleural effusions.  And progression of lymphangitic spread in the lungs.  ABG showed hypoxia and hypercarbia.  He was given 40 mg of IV Lasix.  He is being admitted for further evaluation.    Review of Systems   Gen- No fevers, chills  CV- No chest pain, palpitations  Resp- + cough, + dyspnea  GI- + decreased appetite  + LE edema    All other systems reviewed and are negative.     Personal History     Past Medical History:   Diagnosis Date   • Cancer (CMS/HCC)     Renal, Spine, Brain   • Hypertension        Past Surgical History:   Procedure Laterality Date   • CYBERKNIFE Right 01/09/2020    Right paraspinal lesion behind the L2 vertebral body- Dr. Michael Rizo    • KNEE SURGERY Left     x 3   • ROTATOR CUFF REPAIR Right 2014       Family History: family history includes Cancer in his father; Heart disease in his father. Otherwise pertinent FHx was reviewed and unremarkable.     Social History:  reports that he has never smoked. He has never used smokeless tobacco. He reports that he does not drink alcohol or use drugs.  Social History     Social History Narrative    Lives in [place]. 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        Medications:  Available home medication information reviewed.    No Known Allergies    Objective   Objective     Vital Signs:   Temp:  [99.2 °F (37.3 °C)] 99.2 °F (37.3 °C)  Heart Rate:  [93-99] 99  Resp:  [18-20] 20  BP: (109-120)/(67-73) 120/67   Total (NIH Stroke Scale): 0    Physical Exam   Constitutional: Awake, alert, sitting up in bed, ill appearing  Eyes: PERRLA, sclerae  anicteric, no conjunctival injection  HENT: NCAT, mucous membranes moist  Neck: Supple, no thyromegaly, no lymphadenopathy, trachea midline  Respiratory: diminished breath sounds at bases, tachypnic, on NRB, in moderate resp distress with accessory muscle use, unable to complete sentences  Cardiovascular: tachy, regular, no murmurs, rubs, or gallops  Gastrointestinal: Positive bowel sounds, soft, nontender, nondistended  Musculoskeletal: 1+ bilateral ankle edema, no clubbing or cyanosis to extremities  Psychiatric: Appropriate affect, cooperative  Neurologic: Mild confusion, but oriented, moves all 4, follows commands, no focal weakness  Skin: No rashes      Results Reviewed:  I have personally reviewed current lab and radiology data.    Results from last 7 days   Lab Units 04/05/20  1210  04/05/20  1159   WBC 10*3/mm3 2.68*  --   --    HEMOGLOBIN g/dL 12.2*  --   --    HEMOGLOBIN, POC   --    < >  --    HEMATOCRIT % 37.2*  --   --    HEMATOCRIT POC   --    < >  --    PLATELETS 10*3/mm3 113*  --   --    INR   --   --  1.2    < > = values in this interval not displayed.     Results from last 7 days   Lab Units 04/05/20  1213 04/05/20  1210   SODIUM mmol/L  --  137   POTASSIUM mmol/L  --  4.7   CHLORIDE mmol/L  --  95*   CO2 mmol/L  --  28.0   BUN mg/dL  --  42*   CREATININE mg/dL  --  1.86*   GLUCOSE mg/dL  --  109*   CALCIUM mg/dL  --  8.7   ALT (SGPT) U/L  --  44*   AST (SGOT) U/L  --  23   TROPONIN T ng/mL  --  <0.010   PROBNP pg/mL  --  3,078.0*   LACTATE mmol/L 2.1*  --    PROCALCITONIN ng/mL  --  0.41*     Estimated Creatinine Clearance: 47 mL/min (A) (by C-G formula based on SCr of 1.86 mg/dL (H)).  Brief Urine Lab Results  (Last result in the past 365 days)      Color   Clarity   Blood   Leuk Est   Nitrite   Protein   CREAT   Urine HCG        04/05/20 1527 Yellow Clear Negative Negative Negative 30 mg/dL (1+)             Imaging Results (Last 24 Hours)     Procedure Component Value Units Date/Time    XR Chest  1 View [[id number]] Collected:  04/05/20 1313     Updated:  04/05/20 1420    Narrative:          EXAMINATION: XR CHEST, SINGLE VIEW - 04/05/2020     INDICATION: Shortness of air, difficulty breathing. Stroke protocol.     COMPARISON: 03/02/2020     FINDINGS: Portable chest reveals worsening of the patchy airspace  disease identified within the mid and lower lung fields bilaterally with  small bilateral pleural effusions which are slightly worsened in the  interval. The heart is borderline enlarged. Degenerative changes seen  within the spine. Upper lung fields again reveal increased markings  bilaterally. Degenerative changes seen within the spine.           Impression:       Patchy airspace disease seen within the mid and lower lung  fields bilaterally with small bilateral pleural effusions. Findings have  worsened in the interval.     DICTATED:   04/05/2020  EDITED/ls :   04/05/2020        CT Chest Without Contrast [[id number]] Collected:  04/05/20 1336     Updated:  04/05/20 1358    Narrative:       EXAMINATION: CT CHEST WO CONTRAST - 04/05/2020     INDICATION: Shortness of breath, generalized weakness, low O2 sats.     TECHNIQUE: Multiple axial CT imaging is obtained of the chest without  the administration of intravenous contrast.     The radiation dose reduction device was turned on for each scan per the  ALARA (As Low as Reasonably Achievable) protocol.     COMPARISON: 03/12/2020     FINDINGS: There are bilateral pleural effusions, left greater than right  and moderate to large in size. Multiple fibronodular densities seen  diffusely throughout the aerated portions of the lung fields with  consolidation seen in the perihilar regions bilaterally, all suggesting  lymphangitic spread of disease which is significantly worsened on  today's examination than when compared to the prior study. The  examination is atypical in appearance with uncommon reported features  for COVID-19 pneumonia. Alternative diagnoses should  be considered such  as lymphangitic spread of disease. A superimposed pneumonia in the  perihilar regions cannot completely be excluded, however, clinical  correlation is needed. There is no mediastinal mass. Similar  lymphadenopathy identified in the mediastinum and hilar regions. Cardiac  chambers within normal limits. There is no pericardial effusion. The  upper abdomen reveals metastatic disease diffusely throughout the liver.  There is abnormal appearance of the left kidney with stranding.  Adenopathy in the left perirenal space as well as retroperitoneal  adenopathy. Visualized spleen is homogeneous. Diverticulosis of the  colon. Pancreas is homogeneous. The bony structures reveal degenerative  changes seen within the spine.       Impression:       Bilateral pleural effusions are moderate to large in size,  left greater than right, with significant increase seen in the  lymphangitic spread of disease,  now with consolidation seen in the  perihilar regions bilaterally with multiple fibronodular densities  identified. The examination is atypical in appearance for COVID-19  pneumonia. Findings most suggestive again of lymphangitic spread of  disease. A superimposed central infiltrate cannot be completely  excluded, and clinical correlation is needed. There is extensive  metastatic disease identified within the upper abdomen.     DICTATED:   04/05/2020  EDITED/ls :   04/05/2020        CT Head Without Contrast Stroke Protocol [[id number]] Collected:  04/05/20 1202     Updated:  04/05/20 1210    Narrative:       EXAMINATION: CT HEAD WO CONTRAST  - 04/05/2020     INDICATION: Generalized weakness. Evaluate for stroke.     TECHNIQUE: Multiple axial CT imaging is obtained of the head from skull  base to skull vertex without the administration of intravenous contrast.  Stroke protocol.     The radiation dose reduction device was turned on for each scan per the  ALARA (As Low as Reasonably Achievable) protocol.      COMPARISON: 03/02/2020     FINDINGS: The brain parenchyma is unremarkable in appearance.  Low-density area identified in the right basal ganglia possibly from  prior insult. This is stable and unchanged when compared to the prior  study. No hemorrhage or hydrocephalus. No mass, mass effect or midline  shift. No abnormal extra-axial fluid collection identified. Bony  structures reveal no evidence of osseous abnormality. Visualized  paranasal sinuses are clear. The mastoid air cells are patent.       Impression:       Old insult to the right basal ganglia, small in size with no  acute intracranial abnormality identified.     Examination was performed 04/05/2020 at 11:46 AM and examination results  were given to the ER physician at the scanner by Dr. Lindsay on 04/05/2020  at 11:48 AM.     DICTATED:   04/05/2020  EDITED/ls :   04/05/2020               Results for orders placed during the hospital encounter of 01/29/20   Adult Transthoracic Echo Complete W/ Cont if Necessary Per Protocol    Narrative · Estimated EF = 50-55%.  · Left ventricular systolic function is low normal.  · Mild tricuspid valve regurgitation is present.  · Calculated right ventricular systolic pressure from tricuspid   regurgitation is 27 mmHg.  · The global longitudinal strain is calculated at -17 which is slightly   less than normal (normal is -20)          Assessment/Plan   Assessment & Plan     Active Hospital Problems    Diagnosis POA   • **Acute respiratory failure with hypoxia and hypercapnia (CMS/HCC) [J96.01, J96.02] Yes     Priority: Medium   • Acute hypoxemic respiratory failure (CMS/HCC) [J96.01] Yes   • CKD (chronic kidney disease) stage 3, GFR 30-59 ml/min (CMS/HCC) [N18.3] Yes   • Malignant pleural effusion [J91.0] Yes   • Metastatic cancer (CMS/HCC) [C79.9] Yes   • Bone metastases (CMS/HCC) [C79.51] Yes   • Renal cancer, left (CMS/HCC) [C64.2] Yes   • Lumbar spine tumor [D49.2] Yes       Mr. Evans is an unfortunate 54-year-old  male with widely metastatic renal cell carcinoma followed by Dr. Coffey and now on second line chemotherapy with Gemzar and carboplatin.  I personally reviewed chest CT which shows left greater than right pleural effusions with worsening lymphangitic spread compared to March.  He has acute hypoxic and hypercarbic respiratory failure as a result of this.  I discussed with him and his sister via phone (Felecia Sandoval LENNY with hospitalist) that he is a very ill.  Could have worsening tonight.  Based on my discussion with him and his sister we have opted for a DO NOT RESUSCITATE and overall goals towards comfort which I think is appropriate.  I have a low suspicion for COVID based on history, CT, and risk factors but cannot completely exclude.  He has been placed on the COVID rule out floor.  Will defer to day team to determine testing.  Pending that decision the patient will need a thoracentesis for symptoms, Dr. Coffey consult, and palliative care consult.  For now we will provide him oxygen and symptom support with morphine, Phenergan.  Agree with the Lasix given him by the emergency room.  Creatinine is on the upper limits of his recent baseline.  I will defer blood work in the morning due to general comfort based approach.      Addendum: Discussed with Dr. Zavala, will sent COVID test to [facility] in AM.    COVID-19 RISK SCREEN   1.   Has the patient had close contact without PPE with a lab confirmed COVID-19 (+) person or a person under investigation (PUI) for COVID-19 infection?  -- No     2. Has the patient had respiratory symptoms, worsened/new cough and/or SOA, unexplained fever, or sudden loss of smell and/or taste in the past 7 days? --  Yes    3. Does the patient have baseline higher exposure risk such as working in healthcare field, currently residing in healthcare facility, or ongoing hemodialysis?  --  No         Critical Care time spent in direct patient care:    40 minutes (excluding  procedure time, if applicable) including high complexity decision making to assess and treat vital organ system failure in this individual who has impairment of one or more vital organ systems such that there is a high probability of imminent or life threatening deterioration in the patient’s condition and failure to initiate the above interventions on an urgent basis would likely result in sudden, clinically significant or life threatening deterioration in the patient's condition.  16:08        DVT prophylaxis:  lovenox    CODE STATUS:    Code Status and Medical Interventions:   Ordered at: 20 1552     Level Of Support Discussed With:    Patient    Health Care Surrogate     Code Status:    No CPR     Medical Interventions (Level of Support Prior to Arrest):    Comfort Measures       Admission Status:  I believe this patient meets inpatient criteria based on acute respiratory failure with moderate respiratory distress, interval worsening of lymphangitic spread of his cancer.  Anticipate him being in the hospital least 2 nights.    Electronically signed by Jose Vu MD, 20, 3:52 PM.      Electronically signed by Jose Vu MD at 20 1064             Joseph Millan MD at 20 1143          H&P reviewed. The patient was examined and there are no changes to the H&P.          Electronically signed by Joseph Millan MD at 20 1143   Source Note              [facility] Medicine Services  HISTORY AND PHYSICAL    Patient Name: Alphonso Evans  : 1965  MRN: [id number]  Primary Care Physician: Juan Mccarty MD  Date of admission: 2020      Subjective   Subjective     Chief Complaint:  Shortness of breath    HPI:  Alphonso Evans is a 54 y.o. male with a history of metastatic renal cell carcinoma initially diagnosed in 2019.  Is found to be widely metastatic including bones, liver, recent lymphangitic spread in the lungs.   Followed by Dr. Coffey and started on Keytruda and axitinib in January.  Had disease progression and most recently started on carboplatin and Gemzar.  Presents to the emergency room with progressive shortness of breath.  Has no malignant pleural effusions and last tapped on 3/4.  Patient is currently a poor historian.  I did discuss with his sister who is a nurse practitioner who states that his breathing has gotten worse over the last few days.  Normally does not wear oxygen at baseline.  He denies any fevers.  Positive nonproductive cough.  He has no recent travel, mostly stays inside, has no known COVID exposures.  Work-up in the emergency room included a CT of his chest which showed recurrent left greater than right pleural effusions.  And progression of lymphangitic spread in the lungs.  ABG showed hypoxia and hypercarbia.  He was given 40 mg of IV Lasix.  He is being admitted for further evaluation.    Review of Systems   Gen- No fevers, chills  CV- No chest pain, palpitations  Resp- + cough, + dyspnea  GI- + decreased appetite  + LE edema    All other systems reviewed and are negative.     Personal History     Past Medical History:   Diagnosis Date   • Cancer (CMS/HCC)     Renal, Spine, Brain   • Hypertension        Past Surgical History:   Procedure Laterality Date   • CYBERKNIFE Right 01/09/2020    Right paraspinal lesion behind the L2 vertebral body- Dr. Michael Rizo    • KNEE SURGERY Left     x 3   • ROTATOR CUFF REPAIR Right 2014       Family History: family history includes Cancer in his father; Heart disease in his father. Otherwise pertinent FHx was reviewed and unremarkable.     Social History:  reports that he has never smoked. He has never used smokeless tobacco. He reports that he does not drink alcohol or use drugs.  Social History     Social History Narrative    Lives in [place]. 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        Medications:  Available home medication  information reviewed.    No Known Allergies    Objective   Objective     Vital Signs:   Temp:  [99.2 °F (37.3 °C)] 99.2 °F (37.3 °C)  Heart Rate:  [93-99] 99  Resp:  [18-20] 20  BP: (109-120)/(67-73) 120/67   Total (NIH Stroke Scale): 0    Physical Exam   Constitutional: Awake, alert, sitting up in bed, ill appearing  Eyes: PERRLA, sclerae anicteric, no conjunctival injection  HENT: NCAT, mucous membranes moist  Neck: Supple, no thyromegaly, no lymphadenopathy, trachea midline  Respiratory: diminished breath sounds at bases, tachypnic, on NRB, in moderate resp distress with accessory muscle use, unable to complete sentences  Cardiovascular: tachy, regular, no murmurs, rubs, or gallops  Gastrointestinal: Positive bowel sounds, soft, nontender, nondistended  Musculoskeletal: 1+ bilateral ankle edema, no clubbing or cyanosis to extremities  Psychiatric: Appropriate affect, cooperative  Neurologic: Mild confusion, but oriented, moves all 4, follows commands, no focal weakness  Skin: No rashes      Results Reviewed:  I have personally reviewed current lab and radiology data.    Results from last 7 days   Lab Units 04/05/20  1210  04/05/20  1159   WBC 10*3/mm3 2.68*  --   --    HEMOGLOBIN g/dL 12.2*  --   --    HEMOGLOBIN, POC   --    < >  --    HEMATOCRIT % 37.2*  --   --    HEMATOCRIT POC   --    < >  --    PLATELETS 10*3/mm3 113*  --   --    INR   --   --  1.2    < > = values in this interval not displayed.     Results from last 7 days   Lab Units 04/05/20  1213 04/05/20  1210   SODIUM mmol/L  --  137   POTASSIUM mmol/L  --  4.7   CHLORIDE mmol/L  --  95*   CO2 mmol/L  --  28.0   BUN mg/dL  --  42*   CREATININE mg/dL  --  1.86*   GLUCOSE mg/dL  --  109*   CALCIUM mg/dL  --  8.7   ALT (SGPT) U/L  --  44*   AST (SGOT) U/L  --  23   TROPONIN T ng/mL  --  <0.010   PROBNP pg/mL  --  3,078.0*   LACTATE mmol/L 2.1*  --    PROCALCITONIN ng/mL  --  0.41*     Estimated Creatinine Clearance: 47 mL/min (A) (by C-G formula based on  SCr of 1.86 mg/dL (H)).  Brief Urine Lab Results  (Last result in the past 365 days)      Color   Clarity   Blood   Leuk Est   Nitrite   Protein   CREAT   Urine HCG        04/05/20 1527 Yellow Clear Negative Negative Negative 30 mg/dL (1+)             Imaging Results (Last 24 Hours)     Procedure Component Value Units Date/Time    XR Chest 1 View [[id number]] Collected:  04/05/20 1313     Updated:  04/05/20 1420    Narrative:          EXAMINATION: XR CHEST, SINGLE VIEW - 04/05/2020     INDICATION: Shortness of air, difficulty breathing. Stroke protocol.     COMPARISON: 03/02/2020     FINDINGS: Portable chest reveals worsening of the patchy airspace  disease identified within the mid and lower lung fields bilaterally with  small bilateral pleural effusions which are slightly worsened in the  interval. The heart is borderline enlarged. Degenerative changes seen  within the spine. Upper lung fields again reveal increased markings  bilaterally. Degenerative changes seen within the spine.           Impression:       Patchy airspace disease seen within the mid and lower lung  fields bilaterally with small bilateral pleural effusions. Findings have  worsened in the interval.     DICTATED:   04/05/2020  EDITED/ls :   04/05/2020        CT Chest Without Contrast [[id number]] Collected:  04/05/20 1336     Updated:  04/05/20 1358    Narrative:       EXAMINATION: CT CHEST WO CONTRAST - 04/05/2020     INDICATION: Shortness of breath, generalized weakness, low O2 sats.     TECHNIQUE: Multiple axial CT imaging is obtained of the chest without  the administration of intravenous contrast.     The radiation dose reduction device was turned on for each scan per the  ALARA (As Low as Reasonably Achievable) protocol.     COMPARISON: 03/12/2020     FINDINGS: There are bilateral pleural effusions, left greater than right  and moderate to large in size. Multiple fibronodular densities seen  diffusely throughout the aerated portions of the  lung fields with  consolidation seen in the perihilar regions bilaterally, all suggesting  lymphangitic spread of disease which is significantly worsened on  today's examination than when compared to the prior study. The  examination is atypical in appearance with uncommon reported features  for COVID-19 pneumonia. Alternative diagnoses should be considered such  as lymphangitic spread of disease. A superimposed pneumonia in the  perihilar regions cannot completely be excluded, however, clinical  correlation is needed. There is no mediastinal mass. Similar  lymphadenopathy identified in the mediastinum and hilar regions. Cardiac  chambers within normal limits. There is no pericardial effusion. The  upper abdomen reveals metastatic disease diffusely throughout the liver.  There is abnormal appearance of the left kidney with stranding.  Adenopathy in the left perirenal space as well as retroperitoneal  adenopathy. Visualized spleen is homogeneous. Diverticulosis of the  colon. Pancreas is homogeneous. The bony structures reveal degenerative  changes seen within the spine.       Impression:       Bilateral pleural effusions are moderate to large in size,  left greater than right, with significant increase seen in the  lymphangitic spread of disease,  now with consolidation seen in the  perihilar regions bilaterally with multiple fibronodular densities  identified. The examination is atypical in appearance for COVID-19  pneumonia. Findings most suggestive again of lymphangitic spread of  disease. A superimposed central infiltrate cannot be completely  excluded, and clinical correlation is needed. There is extensive  metastatic disease identified within the upper abdomen.     DICTATED:   04/05/2020  EDITED/ls :   04/05/2020        CT Head Without Contrast Stroke Protocol [[id number]] Collected:  04/05/20 1202     Updated:  04/05/20 1210    Narrative:       EXAMINATION: CT HEAD WO CONTRAST  - 04/05/2020     INDICATION:  Generalized weakness. Evaluate for stroke.     TECHNIQUE: Multiple axial CT imaging is obtained of the head from skull  base to skull vertex without the administration of intravenous contrast.  Stroke protocol.     The radiation dose reduction device was turned on for each scan per the  ALARA (As Low as Reasonably Achievable) protocol.     COMPARISON: 03/02/2020     FINDINGS: The brain parenchyma is unremarkable in appearance.  Low-density area identified in the right basal ganglia possibly from  prior insult. This is stable and unchanged when compared to the prior  study. No hemorrhage or hydrocephalus. No mass, mass effect or midline  shift. No abnormal extra-axial fluid collection identified. Bony  structures reveal no evidence of osseous abnormality. Visualized  paranasal sinuses are clear. The mastoid air cells are patent.       Impression:       Old insult to the right basal ganglia, small in size with no  acute intracranial abnormality identified.     Examination was performed 04/05/2020 at 11:46 AM and examination results  were given to the ER physician at the scanner by Dr. Lindsay on 04/05/2020  at 11:48 AM.     DICTATED:   04/05/2020  EDITED/ls :   04/05/2020               Results for orders placed during the hospital encounter of 01/29/20   Adult Transthoracic Echo Complete W/ Cont if Necessary Per Protocol    Narrative · Estimated EF = 50-55%.  · Left ventricular systolic function is low normal.  · Mild tricuspid valve regurgitation is present.  · Calculated right ventricular systolic pressure from tricuspid   regurgitation is 27 mmHg.  · The global longitudinal strain is calculated at -17 which is slightly   less than normal (normal is -20)          Assessment/Plan   Assessment & Plan     Active Hospital Problems    Diagnosis POA   • **Acute respiratory failure with hypoxia and hypercapnia (CMS/HCC) [J96.01, J96.02] Yes     Priority: Medium   • Acute hypoxemic respiratory failure (CMS/HCC) [J96.01] Yes    • CKD (chronic kidney disease) stage 3, GFR 30-59 ml/min (CMS/HCC) [N18.3] Yes   • Malignant pleural effusion [J91.0] Yes   • Metastatic cancer (CMS/HCC) [C79.9] Yes   • Bone metastases (CMS/HCC) [C79.51] Yes   • Renal cancer, left (CMS/HCC) [C64.2] Yes   • Lumbar spine tumor [D49.2] Yes       Mr. Evans is an unfortunate 54-year-old male with widely metastatic renal cell carcinoma followed by Dr. Coffey and now on second line chemotherapy with Gemzar and carboplatin.  I personally reviewed chest CT which shows left greater than right pleural effusions with worsening lymphangitic spread compared to March.  He has acute hypoxic and hypercarbic respiratory failure as a result of this.  I discussed with him and his sister via phone (Felecia Maldonadoshaka GALVEZ with hospitalist) that he is a very ill.  Could have worsening tonight.  Based on my discussion with him and his sister we have opted for a DO NOT RESUSCITATE and overall goals towards comfort which I think is appropriate.  I have a low suspicion for COVID based on history, CT, and risk factors but cannot completely exclude.  He has been placed on the COVID rule out floor.  Will defer to day team to determine testing.  Pending that decision the patient will need a thoracentesis for symptoms, Dr. Coffey consult, and palliative care consult.  For now we will provide him oxygen and symptom support with morphine, Phenergan.  Agree with the Lasix given him by the emergency room.  Creatinine is on the upper limits of his recent baseline.  I will defer blood work in the morning due to general comfort based approach.      Addendum: Discussed with Dr. Zavala, will sent COVID test to [facility] in AM.    COVID-19 RISK SCREEN   4.   Has the patient had close contact without PPE with a lab confirmed COVID-19 (+) person or a person under investigation (PUI) for COVID-19 infection?  -- No     5. Has the patient had respiratory symptoms, worsened/new cough and/or SOA,  unexplained fever, or sudden loss of smell and/or taste in the past 7 days? --  Yes    6. Does the patient have baseline higher exposure risk such as working in healthcare field, currently residing in healthcare facility, or ongoing hemodialysis?  --  No         Critical Care time spent in direct patient care:    40 minutes (excluding procedure time, if applicable) including high complexity decision making to assess and treat vital organ system failure in this individual who has impairment of one or more vital organ systems such that there is a high probability of imminent or life threatening deterioration in the patient’s condition and failure to initiate the above interventions on an urgent basis would likely result in sudden, clinically significant or life threatening deterioration in the patient's condition.  16:08        DVT prophylaxis:  lovenox    CODE STATUS:    Code Status and Medical Interventions:   Ordered at: 20 1552     Level Of Support Discussed With:    Patient    Health Care Surrogate     Code Status:    No CPR     Medical Interventions (Level of Support Prior to Arrest):    Comfort Measures       Admission Status:  I believe this patient meets inpatient criteria based on acute respiratory failure with moderate respiratory distress, interval worsening of lymphangitic spread of his cancer.  Anticipate him being in the hospital least 2 nights.    Electronically signed by Jose Vu MD, 20, 3:52 PM.      Electronically signed by Jose Vu MD at 20 [id number]             Jose Vu MD at 20 1548              [facility] Medicine Services  HISTORY AND PHYSICAL    Patient Name: Alphonso Evans  : 1965  MRN: [id number]  Primary Care Physician: Juan Mccarty MD  Date of admission: 2020      Subjective   Subjective     Chief Complaint:  Shortness of breath    HPI:  Alphonso Evans is a 54 y.o. male with a history of metastatic  renal cell carcinoma initially diagnosed in February 2019.  Is found to be widely metastatic including bones, liver, recent lymphangitic spread in the lungs.  Followed by Dr. Coffey and started on Keytruda and axitinib in January.  Had disease progression and most recently started on carboplatin and Gemzar.  Presents to the emergency room with progressive shortness of breath.  Has no malignant pleural effusions and last tapped on 3/4.  Patient is currently a poor historian.  I did discuss with his sister who is a nurse practitioner who states that his breathing has gotten worse over the last few days.  Normally does not wear oxygen at baseline.  He denies any fevers.  Positive nonproductive cough.  He has no recent travel, mostly stays inside, has no known COVID exposures.  Work-up in the emergency room included a CT of his chest which showed recurrent left greater than right pleural effusions.  And progression of lymphangitic spread in the lungs.  ABG showed hypoxia and hypercarbia.  He was given 40 mg of IV Lasix.  He is being admitted for further evaluation.    Review of Systems   Gen- No fevers, chills  CV- No chest pain, palpitations  Resp- + cough, + dyspnea  GI- + decreased appetite  + LE edema    All other systems reviewed and are negative.     Personal History     Past Medical History:   Diagnosis Date   • Cancer (CMS/HCC)     Renal, Spine, Brain   • Hypertension        Past Surgical History:   Procedure Laterality Date   • CYBERKNIFE Right 01/09/2020    Right paraspinal lesion behind the L2 vertebral body- Dr. Michael Rizo    • KNEE SURGERY Left     x 3   • ROTATOR CUFF REPAIR Right 2014       Family History: family history includes Cancer in his father; Heart disease in his father. Otherwise pertinent FHx was reviewed and unremarkable.     Social History:  reports that he has never smoked. He has never used smokeless tobacco. He reports that he does not drink alcohol or use drugs.  Social History      Social History Narrative    Lives in [place]. 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        Medications:  Available home medication information reviewed.    No Known Allergies    Objective   Objective     Vital Signs:   Temp:  [99.2 °F (37.3 °C)] 99.2 °F (37.3 °C)  Heart Rate:  [93-99] 99  Resp:  [18-20] 20  BP: (109-120)/(67-73) 120/67   Total (NIH Stroke Scale): 0    Physical Exam   Constitutional: Awake, alert, sitting up in bed, ill appearing  Eyes: PERRLA, sclerae anicteric, no conjunctival injection  HENT: NCAT, mucous membranes moist  Neck: Supple, no thyromegaly, no lymphadenopathy, trachea midline  Respiratory: diminished breath sounds at bases, tachypnic, on NRB, in moderate resp distress with accessory muscle use, unable to complete sentences  Cardiovascular: tachy, regular, no murmurs, rubs, or gallops  Gastrointestinal: Positive bowel sounds, soft, nontender, nondistended  Musculoskeletal: 1+ bilateral ankle edema, no clubbing or cyanosis to extremities  Psychiatric: Appropriate affect, cooperative  Neurologic: Mild confusion, but oriented, moves all 4, follows commands, no focal weakness  Skin: No rashes      Results Reviewed:  I have personally reviewed current lab and radiology data.    Results from last 7 days   Lab Units 04/05/20  1210  04/05/20  1159   WBC 10*3/mm3 2.68*  --   --    HEMOGLOBIN g/dL 12.2*  --   --    HEMOGLOBIN, POC   --    < >  --    HEMATOCRIT % 37.2*  --   --    HEMATOCRIT POC   --    < >  --    PLATELETS 10*3/mm3 113*  --   --    INR   --   --  1.2    < > = values in this interval not displayed.     Results from last 7 days   Lab Units 04/05/20  1213 04/05/20  1210   SODIUM mmol/L  --  137   POTASSIUM mmol/L  --  4.7   CHLORIDE mmol/L  --  95*   CO2 mmol/L  --  28.0   BUN mg/dL  --  42*   CREATININE mg/dL  --  1.86*   GLUCOSE mg/dL  --  109*   CALCIUM mg/dL  --  8.7   ALT (SGPT) U/L  --  44*   AST (SGOT) U/L  --  23   TROPONIN T ng/mL  --  <0.010    PROBNP pg/mL  --  3,078.0*   LACTATE mmol/L 2.1*  --    PROCALCITONIN ng/mL  --  0.41*     Estimated Creatinine Clearance: 47 mL/min (A) (by C-G formula based on SCr of 1.86 mg/dL (H)).  Brief Urine Lab Results  (Last result in the past 365 days)      Color   Clarity   Blood   Leuk Est   Nitrite   Protein   CREAT   Urine HCG        04/05/20 1527 Yellow Clear Negative Negative Negative 30 mg/dL (1+)             Imaging Results (Last 24 Hours)     Procedure Component Value Units Date/Time    XR Chest 1 View [[id number]] Collected:  04/05/20 1313     Updated:  04/05/20 1420    Narrative:          EXAMINATION: XR CHEST, SINGLE VIEW - 04/05/2020     INDICATION: Shortness of air, difficulty breathing. Stroke protocol.     COMPARISON: 03/02/2020     FINDINGS: Portable chest reveals worsening of the patchy airspace  disease identified within the mid and lower lung fields bilaterally with  small bilateral pleural effusions which are slightly worsened in the  interval. The heart is borderline enlarged. Degenerative changes seen  within the spine. Upper lung fields again reveal increased markings  bilaterally. Degenerative changes seen within the spine.           Impression:       Patchy airspace disease seen within the mid and lower lung  fields bilaterally with small bilateral pleural effusions. Findings have  worsened in the interval.     DICTATED:   04/05/2020  EDITED/ls :   04/05/2020        CT Chest Without Contrast [[id number]] Collected:  04/05/20 1336     Updated:  04/05/20 1358    Narrative:       EXAMINATION: CT CHEST WO CONTRAST - 04/05/2020     INDICATION: Shortness of breath, generalized weakness, low O2 sats.     TECHNIQUE: Multiple axial CT imaging is obtained of the chest without  the administration of intravenous contrast.     The radiation dose reduction device was turned on for each scan per the  ALARA (As Low as Reasonably Achievable) protocol.     COMPARISON: 03/12/2020     FINDINGS: There are bilateral  pleural effusions, left greater than right  and moderate to large in size. Multiple fibronodular densities seen  diffusely throughout the aerated portions of the lung fields with  consolidation seen in the perihilar regions bilaterally, all suggesting  lymphangitic spread of disease which is significantly worsened on  today's examination than when compared to the prior study. The  examination is atypical in appearance with uncommon reported features  for COVID-19 pneumonia. Alternative diagnoses should be considered such  as lymphangitic spread of disease. A superimposed pneumonia in the  perihilar regions cannot completely be excluded, however, clinical  correlation is needed. There is no mediastinal mass. Similar  lymphadenopathy identified in the mediastinum and hilar regions. Cardiac  chambers within normal limits. There is no pericardial effusion. The  upper abdomen reveals metastatic disease diffusely throughout the liver.  There is abnormal appearance of the left kidney with stranding.  Adenopathy in the left perirenal space as well as retroperitoneal  adenopathy. Visualized spleen is homogeneous. Diverticulosis of the  colon. Pancreas is homogeneous. The bony structures reveal degenerative  changes seen within the spine.       Impression:       Bilateral pleural effusions are moderate to large in size,  left greater than right, with significant increase seen in the  lymphangitic spread of disease,  now with consolidation seen in the  perihilar regions bilaterally with multiple fibronodular densities  identified. The examination is atypical in appearance for COVID-19  pneumonia. Findings most suggestive again of lymphangitic spread of  disease. A superimposed central infiltrate cannot be completely  excluded, and clinical correlation is needed. There is extensive  metastatic disease identified within the upper abdomen.     DICTATED:   04/05/2020  EDITED/ls :   04/05/2020        CT Head Without Contrast Stroke  Protocol [[id number]] Collected:  04/05/20 1202     Updated:  04/05/20 1210    Narrative:       EXAMINATION: CT HEAD WO CONTRAST  - 04/05/2020     INDICATION: Generalized weakness. Evaluate for stroke.     TECHNIQUE: Multiple axial CT imaging is obtained of the head from skull  base to skull vertex without the administration of intravenous contrast.  Stroke protocol.     The radiation dose reduction device was turned on for each scan per the  ALARA (As Low as Reasonably Achievable) protocol.     COMPARISON: 03/02/2020     FINDINGS: The brain parenchyma is unremarkable in appearance.  Low-density area identified in the right basal ganglia possibly from  prior insult. This is stable and unchanged when compared to the prior  study. No hemorrhage or hydrocephalus. No mass, mass effect or midline  shift. No abnormal extra-axial fluid collection identified. Bony  structures reveal no evidence of osseous abnormality. Visualized  paranasal sinuses are clear. The mastoid air cells are patent.       Impression:       Old insult to the right basal ganglia, small in size with no  acute intracranial abnormality identified.     Examination was performed 04/05/2020 at 11:46 AM and examination results  were given to the ER physician at the scanner by Dr. Lindsay on 04/05/2020  at 11:48 AM.     DICTATED:   04/05/2020  EDITED/ls :   04/05/2020               Results for orders placed during the hospital encounter of 01/29/20   Adult Transthoracic Echo Complete W/ Cont if Necessary Per Protocol    Narrative · Estimated EF = 50-55%.  · Left ventricular systolic function is low normal.  · Mild tricuspid valve regurgitation is present.  · Calculated right ventricular systolic pressure from tricuspid   regurgitation is 27 mmHg.  · The global longitudinal strain is calculated at -17 which is slightly   less than normal (normal is -20)          Assessment/Plan   Assessment & Plan     Active Hospital Problems    Diagnosis POA   • **Acute  respiratory failure with hypoxia and hypercapnia (CMS/HCC) [J96.01, J96.02] Yes     Priority: Medium   • Acute hypoxemic respiratory failure (CMS/HCC) [J96.01] Yes   • CKD (chronic kidney disease) stage 3, GFR 30-59 ml/min (CMS/HCC) [N18.3] Yes   • Malignant pleural effusion [J91.0] Yes   • Metastatic cancer (CMS/HCC) [C79.9] Yes   • Bone metastases (CMS/HCC) [C79.51] Yes   • Renal cancer, left (CMS/HCC) [C64.2] Yes   • Lumbar spine tumor [D49.2] Yes       Mr. Evans is an unfortunate 54-year-old male with widely metastatic renal cell carcinoma followed by Dr. Coffey and now on second line chemotherapy with Gemzar and carboplatin.  I personally reviewed chest CT which shows left greater than right pleural effusions with worsening lymphangitic spread compared to March.  He has acute hypoxic and hypercarbic respiratory failure as a result of this.  I discussed with him and his sister via phone (Felecia GALVEZ with hospitalist) that he is a very ill.  Could have worsening tonight.  Based on my discussion with him and his sister we have opted for a DO NOT RESUSCITATE and overall goals towards comfort which I think is appropriate.  I have a low suspicion for COVID based on history, CT, and risk factors but cannot completely exclude.  He has been placed on the COVID rule out floor.  Will defer to day team to determine testing.  Pending that decision the patient will need a thoracentesis for symptoms, Dr. Coffey consult, and palliative care consult.  For now we will provide him oxygen and symptom support with morphine, Phenergan.  Agree with the Lasix given him by the emergency room.  Creatinine is on the upper limits of his recent baseline.  I will defer blood work in the morning due to general comfort based approach.      Addendum: Discussed with Dr. Zavala, will sent COVID test to [facility] in AM.    COVID-19 RISK SCREEN     7. Has the patient had close contact without PPE with a lab confirmed COVID-19  (+) person or a person under investigation (PUI) for COVID-19 infection?  -- No     8. Has the patient had respiratory symptoms, worsened/new cough and/or SOA, unexplained fever, or sudden loss of smell and/or taste in the past 7 days? --  Yes    9. Does the patient have baseline higher exposure risk such as working in healthcare field, currently residing in healthcare facility, or ongoing hemodialysis?  --  No         Critical Care time spent in direct patient care:    40 minutes (excluding procedure time, if applicable) including high complexity decision making to assess and treat vital organ system failure in this individual who has impairment of one or more vital organ systems such that there is a high probability of imminent or life threatening deterioration in the patient’s condition and failure to initiate the above interventions on an urgent basis would likely result in sudden, clinically significant or life threatening deterioration in the patient's condition.  16:08        DVT prophylaxis:  lovenox    CODE STATUS:    Code Status and Medical Interventions:   Ordered at: 04/05/20 5079     Level Of Support Discussed With:    Patient    Health Care Surrogate     Code Status:    No CPR     Medical Interventions (Level of Support Prior to Arrest):    Comfort Measures       Admission Status:  I believe this patient meets inpatient criteria based on acute respiratory failure with moderate respiratory distress, interval worsening of lymphangitic spread of his cancer.  Anticipate him being in the hospital least 2 nights.    Electronically signed by Jose Vu MD, 04/05/20, 3:52 PM.      Electronically signed by Jose Vu MD at 04/05/20 7253

## 2020-04-07 NOTE — PROGRESS NOTES
Case Management Discharge Note      Final Note: CM consulted to arrange home oxygen for patient.  Order for home oxygen and rollator faxed and called to Mahad with [facility].  DME to be delivered to patient's bedside prior to discharge.  Referral called to Dutch with [facility] for skilled nursing.  Completed Pleur[employer] paperwork faxed to [employer].  Shahnaz Figueroa RN [phone]         Destination      No service has been selected for the patient.      Durable Medical Equipment      Service Provider Request Status Selected Services Address Phone Number Fax Number    [facility] Selected Durable Medical Equipment [address] [address] [phone]      Dialysis/Infusion      No service has been selected for the patient.      Home Medical Care      Service Provider Request Status Selected Services Address Phone Number Fax Number    [facility] Selected Home Health Services [address] [address] [phone]      Therapy      No service has been selected for the patient.      Community Resources      No service has been selected for the patient.             Final Discharge Disposition Code: 06 - home with home health care

## 2020-04-07 NOTE — NURSING NOTE
15.5 Fr Left PleurX Catheter placed by Dr Millan in IR.  Pt tolerated well with moderate sedation.  1 liter fluid removed with samples sent to lab per Dr Olguin.  Report called to Tanya on 5B.  Pt returned to room with Janessa in transport.

## 2020-04-07 NOTE — PLAN OF CARE
Problem: Patient Care Overview  Goal: Plan of Care Review  Outcome: Ongoing (interventions implemented as appropriate)  Flowsheets (Taken 4/7/2020 5244)  Progress: no change  Plan of Care Reviewed With: patient  Outcome Summary: Slightly tachycardic. Remains on 6LNC. Lasix given for c/o soa. NPO at midnight. Does not want morphine PCA.

## 2020-04-07 NOTE — CONSULTS
Juan Mccarty MD  Consulting physician: Aleja Gracia  Reason for referral: [facility] discussion, symptom management  Chief Complaint   Patient presents with   • Stroke       HPI:   54 y.o. male with a history of metastatic renal cell carcinoma initially diagnosed in February 2019 and widely metastatic including bones, liver, recent lymphangitic spread in the lungs. Followed by Dr. Coffey and started on Keytruda and axitinib in January.  Had disease progression and most recently started on carboplatin and Gemzar.  Presents to the emergency room with progressive shortness of breath on 4/5/2020.  Has known malignant pleural effusions with Left thoracentesis on 3/3 with 600mL, Right thoracentesis on 3/4 with only 10mL removed, cytology positive for malignant cells.  Normally does not wear oxygen at baseline.  He denies any fevers.  Positive nonproductive cough.  He has no recent travel, mostly stays inside, has no known COVID exposures.  Initial CT chest which showed recurrent left greater than right pleural effusions and progression of lymphangitic spread in the lungs.  ABG showed hypoxia and hypercarbia.  COVID rule out returned negative on 4/6/2020.  All above per admission history documentation.   PleurX catheter placement on Left with 1000mL serous fluid removed.  Symptoms:   Patient seen in room soon after placement of PleurX catheter.  Reports acute pain, splinting Left chest wall.    Primary symptom is dyspnea, reports having difficulty getting his air, primarily with any activity.   Reports some persistent cough  No nausea, no anxiety, sleeping well, bowels staying regular.    Advance care planning discussed: no  Code Status:   Current Code Status     Date Active Code Status Order ID Comments User Context       4/5/2020 1552 No CPR [id number]  Jose Vu MD ED       Questions for Current Code Status     Question Answer Comment    Code Status No CPR     Medical Interventions (Level of Support Prior to Arrest)  Comfort Measures     Level Of Support Discussed With Patient      Health Care Surrogate         Advance Directive: reviewed on medical record  Surrogate decision maker: sister, Maye Sandoval  Past Medical History:   Diagnosis Date   • Cancer (CMS/HCC)     Renal, Spine, Brain   • Hypertension      Past Surgical History:   Procedure Laterality Date   • CYBERKNIFE Right 01/09/2020    Right paraspinal lesion behind the L2 vertebral body- Dr. Michael Rizo    • KNEE SURGERY Left     x 3   • ROTATOR CUFF REPAIR Right 2014       Reviewed current scheduled and prn medications for route, type, dose and frequency.    Current Facility-Administered Medications   Medication Dose Route Frequency Provider Last Rate Last Dose   • albuterol sulfate HFA (PROVENTIL HFA;VENTOLIN HFA;PROAIR HFA) inhaler 2 puff  2 puff Inhalation Q4H PRN Jose Vu MD       • albuterol sulfate HFA (PROVENTIL HFA;VENTOLIN HFA;PROAIR HFA) inhaler 6 puff  6 puff Inhalation Once Abdulaziz Sánchez MD       • fentaNYL citrate (PF) (SUBLIMAZE) 100 MCG/2ML injection  - ADS Override Pull            • guaiFENesin-codeine (ROMILAR-AC) syrup 10 mL  10 mL Oral TID PRN DossetJose busby MD       • heparin (porcine) infusion  - ADS Override Pull            • lidocaine PF 1% (XYLOCAINE) 1 % injection  - ADS Override Pull            • losartan (COZAAR) tablet 50 mg  50 mg Oral Daily Dossett, Jose SALCEDO MD   50 mg at 04/06/20 0824   • melatonin tablet 5 mg  5 mg Oral Nightly PRN Joao Vasquez PA-C   5 mg at 04/06/20 2220   • midazolam (VERSED) 2 MG/2ML injection  - ADS Override Pull            • Morphine sulfate (PF) injection 4 mg  4 mg Intravenous Q2H PRN PiotrsetJose busby MD   4 mg at 04/05/20 1827   • Morphine sulfate PCA 1 mg/mL 30 mL syringe  2 mg/hr Intravenous Continuous DossetJose busby MD       • ondansetron (ZOFRAN) tablet 8 mg  8 mg Oral TID PRN PiotrsetJose busby MD       • promethazine (PHENERGAN) injection 12.5 mg  12.5 mg Intravenous Q6H PRN Dossett,  "Jose SALCEDO MD       • sodium chloride 0.9 % bolus 1,000 mL  1,000 mL Intravenous Once Abdulaziz Sánchez MD   Stopped at 04/05/20 1551   • sodium chloride 0.9 % flush 10 mL  10 mL Intravenous PRN Abdulaziz Sánchez MD       • sodium chloride 0.9 % flush 10 mL  10 mL Intravenous Q12H DossetJose busby MD   10 mL at 04/06/20 2058   • sodium chloride 0.9 % flush 10 mL  10 mL Intravenous PRN Jose Vu MD       • traMADol (ULTRAM) tablet 50 mg  50 mg Oral Q6H PRN Jose Vu MD   50 mg at 04/06/20 1235       Morphine 2 mg/hr     •  albuterol sulfate HFA  •  guaiFENesin-codeine  •  melatonin  •  Morphine  •  ondansetron  •  promethazine  •  sodium chloride  •  sodium chloride  •  traMADol  No Known Allergies  Family History   Problem Relation Age of Onset   • Cancer Father    • Heart disease Father      Social History     Socioeconomic History   • Marital status:      Spouse name: Not on file   • Number of children: Not on file   • Years of education: Not on file   • Highest education level: Not on file   Tobacco Use   • Smoking status: Never Smoker   • Smokeless tobacco: Never Used   Substance and Sexual Activity   • Alcohol use: Never     Frequency: Never   • Drug use: Never   • Sexual activity: Defer   Social History Narrative    Lives in [place]. 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        Review of Systems - +/- per HPI and symptom review.    PPS: 50%  /70 (BP Location: Left arm, Patient Position: Sitting)   Pulse 113   Temp 98.6 °F (37 °C) (Oral)   Resp 18   Ht 170.2 cm (67\")   Wt 86.2 kg (190 lb)   SpO2 96%   BMI 29.76 kg/m²   86.2 kg (190 lb) Body mass index is 29.76 kg/m².  Intake & Output (last day)       04/06 0701 - 04/07 0700 04/07 0701 - 04/08 0700    IV Piggyback      Total Intake(mL/kg)      Urine (mL/kg/hr) 1075 (0.5)     Total Output 1075     Net -1075                 Physical Exam:  General Appearance: No acute distress, muscular appearance, sitting " upright in bed with holding splint on Left chest wall  Head: Normocephalic without obvious abnormality, atraumatic  Eyes: Conjunctivae and sclerae normal, no icterus, DIXIE  Lungs: Clear to auscultation, respirations regular, even and non-labored  Heart: Regular rhythm and normal rate, normal S1  Abdomen: Normal bowel sounds, soft, non-distended, non-tender  Extremities: Moves all extremities, no redness, no cyanosis, trace BLE edema  Pulses: Distal pulses palpable and equal bilaterally  Skin: Warm and dry  Neurological: Alert, interactive, appropriate conversation, no myoclonus  Reviewed labs and diagnostic results.  No results found for: HGBA1C  Results from last 7 days   Lab Units 04/05/20  1210   WBC 10*3/mm3 2.68*   HEMOGLOBIN g/dL 12.2*   HEMATOCRIT % 37.2*   PLATELETS 10*3/mm3 113*     Results from last 7 days   Lab Units 04/05/20  1210   SODIUM mmol/L 137   POTASSIUM mmol/L 4.7   CHLORIDE mmol/L 95*   CO2 mmol/L 28.0   BUN mg/dL 42*   CREATININE mg/dL 1.86*   CALCIUM mg/dL 8.7   BILIRUBIN mg/dL 0.4   ALK PHOS U/L 80   ALT (SGPT) U/L 44*   AST (SGOT) U/L 23   GLUCOSE mg/dL 109*     Results from last 7 days   Lab Units 04/05/20  1210   SODIUM mmol/L 137   POTASSIUM mmol/L 4.7   CHLORIDE mmol/L 95*   CO2 mmol/L 28.0   BUN mg/dL 42*   CREATININE mg/dL 1.86*   GLUCOSE mg/dL 109*   CALCIUM mg/dL 8.7     Imaging Results (Last 72 Hours)     Procedure Component Value Units Date/Time    IR Pleural Drain Plcmt [[id number]] Resulted:  04/07/20 0912     Updated:  04/07/20 0912    CT Chest Without Contrast [[id number]] Collected:  04/05/20 1336     Updated:  04/06/20 0828    Narrative:       EXAMINATION: CT CHEST WO CONTRAST - 04/05/2020     INDICATION: Shortness of breath, generalized weakness, low O2 sats.     TECHNIQUE: Multiple axial CT imaging is obtained of the chest without  the administration of intravenous contrast.     The radiation dose reduction device was turned on for each scan per the  ALARA (As Low as  Reasonably Achievable) protocol.     COMPARISON: 03/12/2020     FINDINGS: There are bilateral pleural effusions, left greater than right  and moderate to large in size. Multiple fibronodular densities seen  diffusely throughout the aerated portions of the lung fields with  consolidation seen in the perihilar regions bilaterally, all suggesting  lymphangitic spread of disease which is significantly worsened on  today's examination than when compared to the prior study. The  examination is atypical in appearance with uncommon reported features  for COVID-19 pneumonia. Alternative diagnoses should be considered such  as lymphangitic spread of disease. A superimposed pneumonia in the  perihilar regions cannot completely be excluded, however, clinical  correlation is needed. There is no mediastinal mass. Similar  lymphadenopathy identified in the mediastinum and hilar regions. Cardiac  chambers within normal limits. There is no pericardial effusion. The  upper abdomen reveals metastatic disease diffusely throughout the liver.  There is abnormal appearance of the left kidney with stranding.  Adenopathy in the left perirenal space as well as retroperitoneal  adenopathy. Visualized spleen is homogeneous. Diverticulosis of the  colon. Pancreas is homogeneous. The bony structures reveal degenerative  changes seen within the spine.       Impression:       Bilateral pleural effusions are moderate to large in size,  left greater than right, with significant increase seen in the  lymphangitic spread of disease,  now with consolidation seen in the  perihilar regions bilaterally with multiple fibronodular densities  identified. The examination is atypical in appearance for COVID-19  pneumonia. Findings most suggestive again of lymphangitic spread of  disease. A superimposed central infiltrate cannot be completely  excluded, and clinical correlation is needed. There is extensive  metastatic disease identified within the upper  abdomen.     DICTATED:   04/05/2020  EDITED/ls :   04/05/2020      This report was finalized on 4/6/2020 8:25 AM by Dr. Raissa Clemente MD.       XR Chest 1 View [[id number]] Collected:  04/05/20 1313     Updated:  04/06/20 0828    Narrative:          EXAMINATION: XR CHEST, SINGLE VIEW - 04/05/2020     INDICATION: Shortness of air, difficulty breathing. Stroke protocol.     COMPARISON: 03/02/2020     FINDINGS: Portable chest reveals worsening of the patchy airspace  disease identified within the mid and lower lung fields bilaterally with  small bilateral pleural effusions which are slightly worsened in the  interval. The heart is borderline enlarged. Degenerative changes seen  within the spine. Upper lung fields again reveal increased markings  bilaterally. Degenerative changes seen within the spine.           Impression:       Patchy airspace disease seen within the mid and lower lung  fields bilaterally with small bilateral pleural effusions. Findings have  worsened in the interval.     DICTATED:   04/05/2020  EDITED/ls :   04/05/2020      This report was finalized on 4/6/2020 8:25 AM by Dr. Raissa Clemente MD.       CT Head Without Contrast Stroke Protocol [[id number]] Collected:  04/05/20 1202     Updated:  04/06/20 0828    Narrative:       EXAMINATION: CT HEAD WO CONTRAST  - 04/05/2020     INDICATION: Generalized weakness. Evaluate for stroke.     TECHNIQUE: Multiple axial CT imaging is obtained of the head from skull  base to skull vertex without the administration of intravenous contrast.  Stroke protocol.     The radiation dose reduction device was turned on for each scan per the  ALARA (As Low as Reasonably Achievable) protocol.     COMPARISON: 03/02/2020     FINDINGS: The brain parenchyma is unremarkable in appearance.  Low-density area identified in the right basal ganglia possibly from  prior insult. This is stable and unchanged when compared to the prior  study. No hemorrhage or hydrocephalus. No  mass, mass effect or midline  shift. No abnormal extra-axial fluid collection identified. Bony  structures reveal no evidence of osseous abnormality. Visualized  paranasal sinuses are clear. The mastoid air cells are patent.       Impression:       Old insult to the right basal ganglia, small in size with no  acute intracranial abnormality identified.     Examination was performed 04/05/2020 at 11:46 AM and examination results  were given to the ER physician at the scanner by Dr. Lindsay on 04/05/2020  at 11:48 AM.     DICTATED:   04/05/2020  EDITED/ls :   04/05/2020         This report was finalized on 4/6/2020 8:25 AM by Dr. Raissa Clemente MD.           Impression: 54 y.o. male with renal cell cancer stage IV, recurrent pleural effusion s/p PleurX catheter  Plan:   Left chest wall pain - schedule acetaminophen  Reviewed RAFAL, #[id number], no discrepancies.     Dyspnea with cough - prn guaifenesin or with codeine  Start oxy IR 2.5mg every 6 hours with prn to assess effectiveness and tolerance to assist with dyspnea management, more persistent and affecting function.     Goals of care - will follow up with Dr Coffey in two days, outpatient.  Patient is followed by Dr Pearce, palliative medicine outpatient.   Palliative will continue to provide support to patient.       Maira Dominguez, APRN  [phone]  04/07/20  10:19      Time : 60  minutes spent reviewing medical and medication records, assessing and examining patient, discussing with patient and nursing staff, answering questions, formulating a plan and documentation of care. > 50% time spent face to face

## 2020-04-07 NOTE — OUTREACH NOTE
Prep Survey      Responses   [facility] facility patient discharged from?  [place]   Is LACE score < 7 ?  No   Eligibility  Readm Mgmt   Discharge diagnosis  Negative Covid-19 Acute Hypoxia Resp Failure/ Met Renal CA   COVID-19 Test Status  Negative   Does the patient have one of the following disease processes/diagnoses(primary or secondary)?  Other   Does the patient have Home health ordered?  Yes   What is the Home health agency?   [facility]   Is there a DME ordered?  Yes   What DME was ordered?  Home O2 and Rollator to [facility]   General alerts for this patient  Looking @ comfort Care   Prep survey completed?  Yes          Sasha Alexis RN

## 2020-04-08 NOTE — DISCHARGE PLACEMENT REQUEST
"Farnaz Evans (54 y.o. Male)     Shahnaz Figueroa RN  [phone]      Date of Birth Social Security Number Address Home Phone MRN    1965  [address] [address] [phone] [id number]    Confucianism Marital Status          None        Admission Date Admission Type Admitting Provider Attending Provider Department, Room/Bed    20 Emergency Elsa Olguin MD  [facility] 5B, N546/1    Discharge Date Discharge Disposition Discharge Destination        2020 Home or Self Care              Attending Provider:  (none)   Allergies:  No Known Allergies    Isolation:  None   Infection:  None   Code Status:  Prior    Ht:  170.2 cm (67\")   Wt:  86.2 kg (190 lb)    Admission Cmt:  None   Principal Problem:  Acute respiratory failure with hypoxia and hypercapnia (CMS/HCC) [J96.01,J96.02]                 Active Insurance as of 2020     Primary Coverage     Payor Plan Insurance Group Employer/Plan Group    ANTH MEDICAID [facility] MEDICAID [id number]     Payor Plan Address Payor Plan Phone Number Payor Plan Fax Number Effective Dates    [address] [address] [phone]  2019 - None Entered    [facility] [address]       Subscriber Name Subscriber Birth Date Member ID       FARNAZ EVANS 1965 [id number]                 Emergency Contacts      (Rel.) Home Phone Work Phone Mobile Phone    INDIO SINGH (Sister) [phone] -- [phone]        [address] [address]  Dept. Phone:  [phone]  Dept. Fax:   Date Ordered: 2020         Patient:  Farnaz Evans MRN:  [id number]   [address] [address] :  1965  SSN:    Phone: [phone] Sex:  M     Weight: 86.2 kg (190 lb)         Ht Readings from Last 1 Encounters:   20 170.2 cm (67\")         [place]man               (Order ID: [id number])    Diagnosis:  Renal " cancer, left (CMS/HCC) (C64.2 [ICD-10-CM] 189.0 [ICD-9-CM])  Acute hypoxemic respiratory failure (CMS/HCC) (J96.01 [ICD-10-CM] 518.81 [ICD-9-CM])   Quantity:  1     Equipment:  Walker Folding with Wheels  Accessories:  Seat Attachment, Walker  Length of Need (99 Months = Lifetime): 99 Months = Lifetime        Authorizing Provider's Phone: [phone]   Verbal Order Mode: Telephone with readback   Authorizing Provider: Elsa Olguin MD  Authorizing Provider's NPI: [id number]     Order Entered By: Shahnaz Figueroa RN 2020  1:14 PM     Electronically signed by: Elsa Olguin MD 2020  4:22 PM               History & Physical      Joseph Millan MD at 20 1146          H&P reviewed. The patient was examined and there are no changes to the H&P.          Electronically signed by Joseph Millan MD at 20 1146   Source Note              [facility] Medicine Services  HISTORY AND PHYSICAL    Patient Name: Alphonso Evans  : 1965  MRN: [id number]  Primary Care Physician: Juan Mccarty MD  Date of admission: 2020      Subjective   Subjective     Chief Complaint:  Shortness of breath    HPI:  Alphonso Evans is a 54 y.o. male with a history of metastatic renal cell carcinoma initially diagnosed in 2019.  Is found to be widely metastatic including bones, liver, recent lymphangitic spread in the lungs.  Followed by Dr. Coffey and started on Keytruda and axitinib in January.  Had disease progression and most recently started on carboplatin and Gemzar.  Presents to the emergency room with progressive shortness of breath.  Has no malignant pleural effusions and last tapped on 3/4.  Patient is currently a poor historian.  I did discuss with his sister who is a nurse practitioner who states that his breathing has gotten worse over the last few days.  Normally does not wear oxygen at baseline.  He denies any fevers.  Positive  nonproductive cough.  He has no recent travel, mostly stays inside, has no known COVID exposures.  Work-up in the emergency room included a CT of his chest which showed recurrent left greater than right pleural effusions.  And progression of lymphangitic spread in the lungs.  ABG showed hypoxia and hypercarbia.  He was given 40 mg of IV Lasix.  He is being admitted for further evaluation.    Review of Systems   Gen- No fevers, chills  CV- No chest pain, palpitations  Resp- + cough, + dyspnea  GI- + decreased appetite  + LE edema    All other systems reviewed and are negative.     Personal History     Past Medical History:   Diagnosis Date   • Cancer (CMS/HCC)     Renal, Spine, Brain   • Hypertension        Past Surgical History:   Procedure Laterality Date   • CYBERKNIFE Right 01/09/2020    Right paraspinal lesion behind the L2 vertebral body- Dr. Michael Rizo    • KNEE SURGERY Left     x 3   • ROTATOR CUFF REPAIR Right 2014       Family History: family history includes Cancer in his father; Heart disease in his father. Otherwise pertinent FHx was reviewed and unremarkable.     Social History:  reports that he has never smoked. He has never used smokeless tobacco. He reports that he does not drink alcohol or use drugs.  Social History     Social History Narrative    Lives in [place]. 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        Medications:  Available home medication information reviewed.    No Known Allergies    Objective   Objective     Vital Signs:   Temp:  [99.2 °F (37.3 °C)] 99.2 °F (37.3 °C)  Heart Rate:  [93-99] 99  Resp:  [18-20] 20  BP: (109-120)/(67-73) 120/67   Total (NIH Stroke Scale): 0    Physical Exam   Constitutional: Awake, alert, sitting up in bed, ill appearing  Eyes: PERRLA, sclerae anicteric, no conjunctival injection  HENT: NCAT, mucous membranes moist  Neck: Supple, no thyromegaly, no lymphadenopathy, trachea midline  Respiratory: diminished breath sounds at  bases, tachypnic, on NRB, in moderate resp distress with accessory muscle use, unable to complete sentences  Cardiovascular: tachy, regular, no murmurs, rubs, or gallops  Gastrointestinal: Positive bowel sounds, soft, nontender, nondistended  Musculoskeletal: 1+ bilateral ankle edema, no clubbing or cyanosis to extremities  Psychiatric: Appropriate affect, cooperative  Neurologic: Mild confusion, but oriented, moves all 4, follows commands, no focal weakness  Skin: No rashes      Results Reviewed:  I have personally reviewed current lab and radiology data.    Results from last 7 days   Lab Units 04/05/20  1210  04/05/20  1159   WBC 10*3/mm3 2.68*  --   --    HEMOGLOBIN g/dL 12.2*  --   --    HEMOGLOBIN, POC   --    < >  --    HEMATOCRIT % 37.2*  --   --    HEMATOCRIT POC   --    < >  --    PLATELETS 10*3/mm3 113*  --   --    INR   --   --  1.2    < > = values in this interval not displayed.     Results from last 7 days   Lab Units 04/05/20  1213 04/05/20  1210   SODIUM mmol/L  --  137   POTASSIUM mmol/L  --  4.7   CHLORIDE mmol/L  --  95*   CO2 mmol/L  --  28.0   BUN mg/dL  --  42*   CREATININE mg/dL  --  1.86*   GLUCOSE mg/dL  --  109*   CALCIUM mg/dL  --  8.7   ALT (SGPT) U/L  --  44*   AST (SGOT) U/L  --  23   TROPONIN T ng/mL  --  <0.010   PROBNP pg/mL  --  3,078.0*   LACTATE mmol/L 2.1*  --    PROCALCITONIN ng/mL  --  0.41*     Estimated Creatinine Clearance: 47 mL/min (A) (by C-G formula based on SCr of 1.86 mg/dL (H)).  Brief Urine Lab Results  (Last result in the past 365 days)      Color   Clarity   Blood   Leuk Est   Nitrite   Protein   CREAT   Urine HCG        04/05/20 1527 Yellow Clear Negative Negative Negative 30 mg/dL (1+)             Imaging Results (Last 24 Hours)     Procedure Component Value Units Date/Time    XR Chest 1 View [[id number]] Collected:  04/05/20 1313     Updated:  04/05/20 1420    Narrative:          EXAMINATION: XR CHEST, SINGLE VIEW - 04/05/2020     INDICATION: Shortness of air,  difficulty breathing. Stroke protocol.     COMPARISON: 03/02/2020     FINDINGS: Portable chest reveals worsening of the patchy airspace  disease identified within the mid and lower lung fields bilaterally with  small bilateral pleural effusions which are slightly worsened in the  interval. The heart is borderline enlarged. Degenerative changes seen  within the spine. Upper lung fields again reveal increased markings  bilaterally. Degenerative changes seen within the spine.           Impression:       Patchy airspace disease seen within the mid and lower lung  fields bilaterally with small bilateral pleural effusions. Findings have  worsened in the interval.     DICTATED:   04/05/2020  EDITED/ls :   04/05/2020        CT Chest Without Contrast [[id number]] Collected:  04/05/20 1336     Updated:  04/05/20 1358    Narrative:       EXAMINATION: CT CHEST WO CONTRAST - 04/05/2020     INDICATION: Shortness of breath, generalized weakness, low O2 sats.     TECHNIQUE: Multiple axial CT imaging is obtained of the chest without  the administration of intravenous contrast.     The radiation dose reduction device was turned on for each scan per the  ALARA (As Low as Reasonably Achievable) protocol.     COMPARISON: 03/12/2020     FINDINGS: There are bilateral pleural effusions, left greater than right  and moderate to large in size. Multiple fibronodular densities seen  diffusely throughout the aerated portions of the lung fields with  consolidation seen in the perihilar regions bilaterally, all suggesting  lymphangitic spread of disease which is significantly worsened on  today's examination than when compared to the prior study. The  examination is atypical in appearance with uncommon reported features  for COVID-19 pneumonia. Alternative diagnoses should be considered such  as lymphangitic spread of disease. A superimposed pneumonia in the  perihilar regions cannot completely be excluded, however, clinical  correlation is  needed. There is no mediastinal mass. Similar  lymphadenopathy identified in the mediastinum and hilar regions. Cardiac  chambers within normal limits. There is no pericardial effusion. The  upper abdomen reveals metastatic disease diffusely throughout the liver.  There is abnormal appearance of the left kidney with stranding.  Adenopathy in the left perirenal space as well as retroperitoneal  adenopathy. Visualized spleen is homogeneous. Diverticulosis of the  colon. Pancreas is homogeneous. The bony structures reveal degenerative  changes seen within the spine.       Impression:       Bilateral pleural effusions are moderate to large in size,  left greater than right, with significant increase seen in the  lymphangitic spread of disease,  now with consolidation seen in the  perihilar regions bilaterally with multiple fibronodular densities  identified. The examination is atypical in appearance for COVID-19  pneumonia. Findings most suggestive again of lymphangitic spread of  disease. A superimposed central infiltrate cannot be completely  excluded, and clinical correlation is needed. There is extensive  metastatic disease identified within the upper abdomen.     DICTATED:   04/05/2020  EDITED/ls :   04/05/2020        CT Head Without Contrast Stroke Protocol [[id number]] Collected:  04/05/20 1202     Updated:  04/05/20 1210    Narrative:       EXAMINATION: CT HEAD WO CONTRAST  - 04/05/2020     INDICATION: Generalized weakness. Evaluate for stroke.     TECHNIQUE: Multiple axial CT imaging is obtained of the head from skull  base to skull vertex without the administration of intravenous contrast.  Stroke protocol.     The radiation dose reduction device was turned on for each scan per the  ALARA (As Low as Reasonably Achievable) protocol.     COMPARISON: 03/02/2020     FINDINGS: The brain parenchyma is unremarkable in appearance.  Low-density area identified in the right basal ganglia possibly from  prior insult. This  is stable and unchanged when compared to the prior  study. No hemorrhage or hydrocephalus. No mass, mass effect or midline  shift. No abnormal extra-axial fluid collection identified. Bony  structures reveal no evidence of osseous abnormality. Visualized  paranasal sinuses are clear. The mastoid air cells are patent.       Impression:       Old insult to the right basal ganglia, small in size with no  acute intracranial abnormality identified.     Examination was performed 04/05/2020 at 11:46 AM and examination results  were given to the ER physician at the scanner by Dr. Lindsay on 04/05/2020  at 11:48 AM.     DICTATED:   04/05/2020  EDITED/ls :   04/05/2020               Results for orders placed during the hospital encounter of 01/29/20   Adult Transthoracic Echo Complete W/ Cont if Necessary Per Protocol    Narrative · Estimated EF = 50-55%.  · Left ventricular systolic function is low normal.  · Mild tricuspid valve regurgitation is present.  · Calculated right ventricular systolic pressure from tricuspid   regurgitation is 27 mmHg.  · The global longitudinal strain is calculated at -17 which is slightly   less than normal (normal is -20)          Assessment/Plan   Assessment & Plan     Active Hospital Problems    Diagnosis POA   • **Acute respiratory failure with hypoxia and hypercapnia (CMS/HCC) [J96.01, J96.02] Yes     Priority: Medium   • Acute hypoxemic respiratory failure (CMS/HCC) [J96.01] Yes   • CKD (chronic kidney disease) stage 3, GFR 30-59 ml/min (CMS/HCC) [N18.3] Yes   • Malignant pleural effusion [J91.0] Yes   • Metastatic cancer (CMS/HCC) [C79.9] Yes   • Bone metastases (CMS/HCC) [C79.51] Yes   • Renal cancer, left (CMS/HCC) [C64.2] Yes   • Lumbar spine tumor [D49.2] Yes       Mr. Evans is an unfortunate 54-year-old male with widely metastatic renal cell carcinoma followed by Dr. Coffey and now on second line chemotherapy with Gemzar and carboplatin.  I personally reviewed chest CT which shows left  greater than right pleural effusions with worsening lymphangitic spread compared to March.  He has acute hypoxic and hypercarbic respiratory failure as a result of this.  I discussed with him and his sister via phone (Felecia Sandoval LENNY with hospitalist) that he is a very ill.  Could have worsening tonight.  Based on my discussion with him and his sister we have opted for a DO NOT RESUSCITATE and overall goals towards comfort which I think is appropriate.  I have a low suspicion for COVID based on history, CT, and risk factors but cannot completely exclude.  He has been placed on the COVID rule out floor.  Will defer to day team to determine testing.  Pending that decision the patient will need a thoracentesis for symptoms, Dr. Coffey consult, and palliative care consult.  For now we will provide him oxygen and symptom support with morphine, Phenergan.  Agree with the Lasix given him by the emergency room.  Creatinine is on the upper limits of his recent baseline.  I will defer blood work in the morning due to general comfort based approach.      Addendum: Discussed with Dr. Zavala, will sent COVID test to [facility] in AM.    COVID-19 RISK SCREEN   1.   Has the patient had close contact without PPE with a lab confirmed COVID-19 (+) person or a person under investigation (PUI) for COVID-19 infection?  -- No     2. Has the patient had respiratory symptoms, worsened/new cough and/or SOA, unexplained fever, or sudden loss of smell and/or taste in the past 7 days? --  Yes    3. Does the patient have baseline higher exposure risk such as working in healthcare field, currently residing in healthcare facility, or ongoing hemodialysis?  --  No         Critical Care time spent in direct patient care:    40 minutes (excluding procedure time, if applicable) including high complexity decision making to assess and treat vital organ system failure in this individual who has impairment of one or more vital organ systems  such that there is a high probability of imminent or life threatening deterioration in the patient’s condition and failure to initiate the above interventions on an urgent basis would likely result in sudden, clinically significant or life threatening deterioration in the patient's condition.  16:08        DVT prophylaxis:  lovenox    CODE STATUS:    Code Status and Medical Interventions:   Ordered at: 20 1552     Level Of Support Discussed With:    Patient    Health Care Surrogate     Code Status:    No CPR     Medical Interventions (Level of Support Prior to Arrest):    Comfort Measures       Admission Status:  I believe this patient meets inpatient criteria based on acute respiratory failure with moderate respiratory distress, interval worsening of lymphangitic spread of his cancer.  Anticipate him being in the hospital least 2 nights.    Electronically signed by Jose Vu MD, 20, 3:52 PM.      Electronically signed by Jose Vu MD at 20 3402             Joseph Millan MD at 20 1143          H&P reviewed. The patient was examined and there are no changes to the H&P.          Electronically signed by Joseph Millan MD at 20 1143   Source Note              [facility] Medicine Services  HISTORY AND PHYSICAL    Patient Name: Alphonso Evans  : 1965  MRN: [id number]  Primary Care Physician: Juan Mccarty MD  Date of admission: 2020      Subjective   Subjective     Chief Complaint:  Shortness of breath    HPI:  Alphonso Evans is a 54 y.o. male with a history of metastatic renal cell carcinoma initially diagnosed in 2019.  Is found to be widely metastatic including bones, liver, recent lymphangitic spread in the lungs.  Followed by Dr. Coffey and started on Keytruda and axitinib in January.  Had disease progression and most recently started on carboplatin and Gemzar.  Presents to the emergency room with progressive  shortness of breath.  Has no malignant pleural effusions and last tapped on 3/4.  Patient is currently a poor historian.  I did discuss with his sister who is a nurse practitioner who states that his breathing has gotten worse over the last few days.  Normally does not wear oxygen at baseline.  He denies any fevers.  Positive nonproductive cough.  He has no recent travel, mostly stays inside, has no known COVID exposures.  Work-up in the emergency room included a CT of his chest which showed recurrent left greater than right pleural effusions.  And progression of lymphangitic spread in the lungs.  ABG showed hypoxia and hypercarbia.  He was given 40 mg of IV Lasix.  He is being admitted for further evaluation.    Review of Systems   Gen- No fevers, chills  CV- No chest pain, palpitations  Resp- + cough, + dyspnea  GI- + decreased appetite  + LE edema    All other systems reviewed and are negative.     Personal History     Past Medical History:   Diagnosis Date   • Cancer (CMS/HCC)     Renal, Spine, Brain   • Hypertension        Past Surgical History:   Procedure Laterality Date   • CYBERKNIFE Right 01/09/2020    Right paraspinal lesion behind the L2 vertebral body- Dr. Michael Rizo    • KNEE SURGERY Left     x 3   • ROTATOR CUFF REPAIR Right 2014       Family History: family history includes Cancer in his father; Heart disease in his father. Otherwise pertinent FHx was reviewed and unremarkable.     Social History:  reports that he has never smoked. He has never used smokeless tobacco. He reports that he does not drink alcohol or use drugs.  Social History     Social History Narrative    Lives in [place]. 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        Medications:  Available home medication information reviewed.    No Known Allergies    Objective   Objective     Vital Signs:   Temp:  [99.2 °F (37.3 °C)] 99.2 °F (37.3 °C)  Heart Rate:  [93-99] 99  Resp:  [18-20] 20  BP: (109-120)/(67-73)  120/67   Total (NIH Stroke Scale): 0    Physical Exam   Constitutional: Awake, alert, sitting up in bed, ill appearing  Eyes: PERRLA, sclerae anicteric, no conjunctival injection  HENT: NCAT, mucous membranes moist  Neck: Supple, no thyromegaly, no lymphadenopathy, trachea midline  Respiratory: diminished breath sounds at bases, tachypnic, on NRB, in moderate resp distress with accessory muscle use, unable to complete sentences  Cardiovascular: tachy, regular, no murmurs, rubs, or gallops  Gastrointestinal: Positive bowel sounds, soft, nontender, nondistended  Musculoskeletal: 1+ bilateral ankle edema, no clubbing or cyanosis to extremities  Psychiatric: Appropriate affect, cooperative  Neurologic: Mild confusion, but oriented, moves all 4, follows commands, no focal weakness  Skin: No rashes      Results Reviewed:  I have personally reviewed current lab and radiology data.    Results from last 7 days   Lab Units 04/05/20  1210  04/05/20  1159   WBC 10*3/mm3 2.68*  --   --    HEMOGLOBIN g/dL 12.2*  --   --    HEMOGLOBIN, POC   --    < >  --    HEMATOCRIT % 37.2*  --   --    HEMATOCRIT POC   --    < >  --    PLATELETS 10*3/mm3 113*  --   --    INR   --   --  1.2    < > = values in this interval not displayed.     Results from last 7 days   Lab Units 04/05/20  1213 04/05/20  1210   SODIUM mmol/L  --  137   POTASSIUM mmol/L  --  4.7   CHLORIDE mmol/L  --  95*   CO2 mmol/L  --  28.0   BUN mg/dL  --  42*   CREATININE mg/dL  --  1.86*   GLUCOSE mg/dL  --  109*   CALCIUM mg/dL  --  8.7   ALT (SGPT) U/L  --  44*   AST (SGOT) U/L  --  23   TROPONIN T ng/mL  --  <0.010   PROBNP pg/mL  --  3,078.0*   LACTATE mmol/L 2.1*  --    PROCALCITONIN ng/mL  --  0.41*     Estimated Creatinine Clearance: 47 mL/min (A) (by C-G formula based on SCr of 1.86 mg/dL (H)).  Brief Urine Lab Results  (Last result in the past 365 days)      Color   Clarity   Blood   Leuk Est   Nitrite   Protein   CREAT   Urine HCG        04/05/20 1527 Yellow Clear  Negative Negative Negative 30 mg/dL (1+)             Imaging Results (Last 24 Hours)     Procedure Component Value Units Date/Time    XR Chest 1 View [[id number]] Collected:  04/05/20 1313     Updated:  04/05/20 1420    Narrative:          EXAMINATION: XR CHEST, SINGLE VIEW - 04/05/2020     INDICATION: Shortness of air, difficulty breathing. Stroke protocol.     COMPARISON: 03/02/2020     FINDINGS: Portable chest reveals worsening of the patchy airspace  disease identified within the mid and lower lung fields bilaterally with  small bilateral pleural effusions which are slightly worsened in the  interval. The heart is borderline enlarged. Degenerative changes seen  within the spine. Upper lung fields again reveal increased markings  bilaterally. Degenerative changes seen within the spine.           Impression:       Patchy airspace disease seen within the mid and lower lung  fields bilaterally with small bilateral pleural effusions. Findings have  worsened in the interval.     DICTATED:   04/05/2020  EDITED/ls :   04/05/2020        CT Chest Without Contrast [[id number]] Collected:  04/05/20 1336     Updated:  04/05/20 1358    Narrative:       EXAMINATION: CT CHEST WO CONTRAST - 04/05/2020     INDICATION: Shortness of breath, generalized weakness, low O2 sats.     TECHNIQUE: Multiple axial CT imaging is obtained of the chest without  the administration of intravenous contrast.     The radiation dose reduction device was turned on for each scan per the  ALARA (As Low as Reasonably Achievable) protocol.     COMPARISON: 03/12/2020     FINDINGS: There are bilateral pleural effusions, left greater than right  and moderate to large in size. Multiple fibronodular densities seen  diffusely throughout the aerated portions of the lung fields with  consolidation seen in the perihilar regions bilaterally, all suggesting  lymphangitic spread of disease which is significantly worsened on  today's examination than when compared to  the prior study. The  examination is atypical in appearance with uncommon reported features  for COVID-19 pneumonia. Alternative diagnoses should be considered such  as lymphangitic spread of disease. A superimposed pneumonia in the  perihilar regions cannot completely be excluded, however, clinical  correlation is needed. There is no mediastinal mass. Similar  lymphadenopathy identified in the mediastinum and hilar regions. Cardiac  chambers within normal limits. There is no pericardial effusion. The  upper abdomen reveals metastatic disease diffusely throughout the liver.  There is abnormal appearance of the left kidney with stranding.  Adenopathy in the left perirenal space as well as retroperitoneal  adenopathy. Visualized spleen is homogeneous. Diverticulosis of the  colon. Pancreas is homogeneous. The bony structures reveal degenerative  changes seen within the spine.       Impression:       Bilateral pleural effusions are moderate to large in size,  left greater than right, with significant increase seen in the  lymphangitic spread of disease,  now with consolidation seen in the  perihilar regions bilaterally with multiple fibronodular densities  identified. The examination is atypical in appearance for COVID-19  pneumonia. Findings most suggestive again of lymphangitic spread of  disease. A superimposed central infiltrate cannot be completely  excluded, and clinical correlation is needed. There is extensive  metastatic disease identified within the upper abdomen.     DICTATED:   04/05/2020  EDITED/ls :   04/05/2020        CT Head Without Contrast Stroke Protocol [[id number]] Collected:  04/05/20 1202     Updated:  04/05/20 1210    Narrative:       EXAMINATION: CT HEAD WO CONTRAST  - 04/05/2020     INDICATION: Generalized weakness. Evaluate for stroke.     TECHNIQUE: Multiple axial CT imaging is obtained of the head from skull  base to skull vertex without the administration of intravenous contrast.  Stroke  protocol.     The radiation dose reduction device was turned on for each scan per the  ALARA (As Low as Reasonably Achievable) protocol.     COMPARISON: 03/02/2020     FINDINGS: The brain parenchyma is unremarkable in appearance.  Low-density area identified in the right basal ganglia possibly from  prior insult. This is stable and unchanged when compared to the prior  study. No hemorrhage or hydrocephalus. No mass, mass effect or midline  shift. No abnormal extra-axial fluid collection identified. Bony  structures reveal no evidence of osseous abnormality. Visualized  paranasal sinuses are clear. The mastoid air cells are patent.       Impression:       Old insult to the right basal ganglia, small in size with no  acute intracranial abnormality identified.     Examination was performed 04/05/2020 at 11:46 AM and examination results  were given to the ER physician at the scanner by Dr. Lindsay on 04/05/2020  at 11:48 AM.     DICTATED:   04/05/2020  EDITED/ls :   04/05/2020               Results for orders placed during the hospital encounter of 01/29/20   Adult Transthoracic Echo Complete W/ Cont if Necessary Per Protocol    Narrative · Estimated EF = 50-55%.  · Left ventricular systolic function is low normal.  · Mild tricuspid valve regurgitation is present.  · Calculated right ventricular systolic pressure from tricuspid   regurgitation is 27 mmHg.  · The global longitudinal strain is calculated at -17 which is slightly   less than normal (normal is -20)          Assessment/Plan   Assessment & Plan     Active Hospital Problems    Diagnosis POA   • **Acute respiratory failure with hypoxia and hypercapnia (CMS/HCC) [J96.01, J96.02] Yes     Priority: Medium   • Acute hypoxemic respiratory failure (CMS/HCC) [J96.01] Yes   • CKD (chronic kidney disease) stage 3, GFR 30-59 ml/min (CMS/HCC) [N18.3] Yes   • Malignant pleural effusion [J91.0] Yes   • Metastatic cancer (CMS/HCC) [C79.9] Yes   • Bone metastases (CMS/HCC)  [C79.51] Yes   • Renal cancer, left (CMS/HCC) [C64.2] Yes   • Lumbar spine tumor [D49.2] Yes       Mr. Evans is an unfortunate 54-year-old male with widely metastatic renal cell carcinoma followed by Dr. Coffey and now on second line chemotherapy with Gemzar and carboplatin.  I personally reviewed chest CT which shows left greater than right pleural effusions with worsening lymphangitic spread compared to March.  He has acute hypoxic and hypercarbic respiratory failure as a result of this.  I discussed with him and his sister via phone (Felecia Sandovalvaldemar GALVEZ with hospitalist) that he is a very ill.  Could have worsening tonight.  Based on my discussion with him and his sister we have opted for a DO NOT RESUSCITATE and overall goals towards comfort which I think is appropriate.  I have a low suspicion for COVID based on history, CT, and risk factors but cannot completely exclude.  He has been placed on the COVID rule out floor.  Will defer to day team to determine testing.  Pending that decision the patient will need a thoracentesis for symptoms, Dr. Coffey consult, and palliative care consult.  For now we will provide him oxygen and symptom support with morphine, Phenergan.  Agree with the Lasix given him by the emergency room.  Creatinine is on the upper limits of his recent baseline.  I will defer blood work in the morning due to general comfort based approach.      Addendum: Discussed with Dr. Zavala, will sent COVID test to [facility] in AM.    COVID-19 RISK SCREEN   4.   Has the patient had close contact without PPE with a lab confirmed COVID-19 (+) person or a person under investigation (PUI) for COVID-19 infection?  -- No     5. Has the patient had respiratory symptoms, worsened/new cough and/or SOA, unexplained fever, or sudden loss of smell and/or taste in the past 7 days? --  Yes    6. Does the patient have baseline higher exposure risk such as working in healthcare field, currently residing in  healthcare facility, or ongoing hemodialysis?  --  No         Critical Care time spent in direct patient care:    40 minutes (excluding procedure time, if applicable) including high complexity decision making to assess and treat vital organ system failure in this individual who has impairment of one or more vital organ systems such that there is a high probability of imminent or life threatening deterioration in the patient’s condition and failure to initiate the above interventions on an urgent basis would likely result in sudden, clinically significant or life threatening deterioration in the patient's condition.  16:08        DVT prophylaxis:  lovenox    CODE STATUS:    Code Status and Medical Interventions:   Ordered at: 20 1552     Level Of Support Discussed With:    Patient    Health Care Surrogate     Code Status:    No CPR     Medical Interventions (Level of Support Prior to Arrest):    Comfort Measures       Admission Status:  I believe this patient meets inpatient criteria based on acute respiratory failure with moderate respiratory distress, interval worsening of lymphangitic spread of his cancer.  Anticipate him being in the hospital least 2 nights.    Electronically signed by Jose Vu MD, 20, 3:52 PM.      Electronically signed by Jose Vu MD at 20 [id number]             Jose Vu MD at 20 1546              [facility] Medicine Services  HISTORY AND PHYSICAL    Patient Name: Alphonso Evans  : 1965  MRN: [id number]  Primary Care Physician: Juan Mccarty MD  Date of admission: 2020      Subjective   Subjective     Chief Complaint:  Shortness of breath    HPI:  Alphonso Evans is a 54 y.o. male with a history of metastatic renal cell carcinoma initially diagnosed in 2019.  Is found to be widely metastatic including bones, liver, recent lymphangitic spread in the lungs.  Followed by Dr. Coffey and started on  Keytruda and axitinib in January.  Had disease progression and most recently started on carboplatin and Gemzar.  Presents to the emergency room with progressive shortness of breath.  Has no malignant pleural effusions and last tapped on 3/4.  Patient is currently a poor historian.  I did discuss with his sister who is a nurse practitioner who states that his breathing has gotten worse over the last few days.  Normally does not wear oxygen at baseline.  He denies any fevers.  Positive nonproductive cough.  He has no recent travel, mostly stays inside, has no known COVID exposures.  Work-up in the emergency room included a CT of his chest which showed recurrent left greater than right pleural effusions.  And progression of lymphangitic spread in the lungs.  ABG showed hypoxia and hypercarbia.  He was given 40 mg of IV Lasix.  He is being admitted for further evaluation.    Review of Systems   Gen- No fevers, chills  CV- No chest pain, palpitations  Resp- + cough, + dyspnea  GI- + decreased appetite  + LE edema    All other systems reviewed and are negative.     Personal History     Past Medical History:   Diagnosis Date   • Cancer (CMS/HCC)     Renal, Spine, Brain   • Hypertension        Past Surgical History:   Procedure Laterality Date   • CYBERKNIFE Right 01/09/2020    Right paraspinal lesion behind the L2 vertebral body- Dr. Michael Rizo    • KNEE SURGERY Left     x 3   • ROTATOR CUFF REPAIR Right 2014       Family History: family history includes Cancer in his father; Heart disease in his father. Otherwise pertinent FHx was reviewed and unremarkable.     Social History:  reports that he has never smoked. He has never used smokeless tobacco. He reports that he does not drink alcohol or use drugs.  Social History     Social History Narrative    Lives in [place]. 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        Medications:  Available home medication information reviewed.    No Known  Allergies    Objective   Objective     Vital Signs:   Temp:  [99.2 °F (37.3 °C)] 99.2 °F (37.3 °C)  Heart Rate:  [93-99] 99  Resp:  [18-20] 20  BP: (109-120)/(67-73) 120/67   Total (NIH Stroke Scale): 0    Physical Exam   Constitutional: Awake, alert, sitting up in bed, ill appearing  Eyes: PERRLA, sclerae anicteric, no conjunctival injection  HENT: NCAT, mucous membranes moist  Neck: Supple, no thyromegaly, no lymphadenopathy, trachea midline  Respiratory: diminished breath sounds at bases, tachypnic, on NRB, in moderate resp distress with accessory muscle use, unable to complete sentences  Cardiovascular: tachy, regular, no murmurs, rubs, or gallops  Gastrointestinal: Positive bowel sounds, soft, nontender, nondistended  Musculoskeletal: 1+ bilateral ankle edema, no clubbing or cyanosis to extremities  Psychiatric: Appropriate affect, cooperative  Neurologic: Mild confusion, but oriented, moves all 4, follows commands, no focal weakness  Skin: No rashes      Results Reviewed:  I have personally reviewed current lab and radiology data.    Results from last 7 days   Lab Units 04/05/20  1210  04/05/20  1159   WBC 10*3/mm3 2.68*  --   --    HEMOGLOBIN g/dL 12.2*  --   --    HEMOGLOBIN, POC   --    < >  --    HEMATOCRIT % 37.2*  --   --    HEMATOCRIT POC   --    < >  --    PLATELETS 10*3/mm3 113*  --   --    INR   --   --  1.2    < > = values in this interval not displayed.     Results from last 7 days   Lab Units 04/05/20  1213 04/05/20  1210   SODIUM mmol/L  --  137   POTASSIUM mmol/L  --  4.7   CHLORIDE mmol/L  --  95*   CO2 mmol/L  --  28.0   BUN mg/dL  --  42*   CREATININE mg/dL  --  1.86*   GLUCOSE mg/dL  --  109*   CALCIUM mg/dL  --  8.7   ALT (SGPT) U/L  --  44*   AST (SGOT) U/L  --  23   TROPONIN T ng/mL  --  <0.010   PROBNP pg/mL  --  3,078.0*   LACTATE mmol/L 2.1*  --    PROCALCITONIN ng/mL  --  0.41*     Estimated Creatinine Clearance: 47 mL/min (A) (by C-G formula based on SCr of 1.86 mg/dL (H)).  Brief  Urine Lab Results  (Last result in the past 365 days)      Color   Clarity   Blood   Leuk Est   Nitrite   Protein   CREAT   Urine HCG        04/05/20 1527 Yellow Clear Negative Negative Negative 30 mg/dL (1+)             Imaging Results (Last 24 Hours)     Procedure Component Value Units Date/Time    XR Chest 1 View [[id number]] Collected:  04/05/20 1313     Updated:  04/05/20 1420    Narrative:          EXAMINATION: XR CHEST, SINGLE VIEW - 04/05/2020     INDICATION: Shortness of air, difficulty breathing. Stroke protocol.     COMPARISON: 03/02/2020     FINDINGS: Portable chest reveals worsening of the patchy airspace  disease identified within the mid and lower lung fields bilaterally with  small bilateral pleural effusions which are slightly worsened in the  interval. The heart is borderline enlarged. Degenerative changes seen  within the spine. Upper lung fields again reveal increased markings  bilaterally. Degenerative changes seen within the spine.           Impression:       Patchy airspace disease seen within the mid and lower lung  fields bilaterally with small bilateral pleural effusions. Findings have  worsened in the interval.     DICTATED:   04/05/2020  EDITED/ls :   04/05/2020        CT Chest Without Contrast [[id number]] Collected:  04/05/20 1336     Updated:  04/05/20 1358    Narrative:       EXAMINATION: CT CHEST WO CONTRAST - 04/05/2020     INDICATION: Shortness of breath, generalized weakness, low O2 sats.     TECHNIQUE: Multiple axial CT imaging is obtained of the chest without  the administration of intravenous contrast.     The radiation dose reduction device was turned on for each scan per the  ALARA (As Low as Reasonably Achievable) protocol.     COMPARISON: 03/12/2020     FINDINGS: There are bilateral pleural effusions, left greater than right  and moderate to large in size. Multiple fibronodular densities seen  diffusely throughout the aerated portions of the lung fields with  consolidation  seen in the perihilar regions bilaterally, all suggesting  lymphangitic spread of disease which is significantly worsened on  today's examination than when compared to the prior study. The  examination is atypical in appearance with uncommon reported features  for COVID-19 pneumonia. Alternative diagnoses should be considered such  as lymphangitic spread of disease. A superimposed pneumonia in the  perihilar regions cannot completely be excluded, however, clinical  correlation is needed. There is no mediastinal mass. Similar  lymphadenopathy identified in the mediastinum and hilar regions. Cardiac  chambers within normal limits. There is no pericardial effusion. The  upper abdomen reveals metastatic disease diffusely throughout the liver.  There is abnormal appearance of the left kidney with stranding.  Adenopathy in the left perirenal space as well as retroperitoneal  adenopathy. Visualized spleen is homogeneous. Diverticulosis of the  colon. Pancreas is homogeneous. The bony structures reveal degenerative  changes seen within the spine.       Impression:       Bilateral pleural effusions are moderate to large in size,  left greater than right, with significant increase seen in the  lymphangitic spread of disease,  now with consolidation seen in the  perihilar regions bilaterally with multiple fibronodular densities  identified. The examination is atypical in appearance for COVID-19  pneumonia. Findings most suggestive again of lymphangitic spread of  disease. A superimposed central infiltrate cannot be completely  excluded, and clinical correlation is needed. There is extensive  metastatic disease identified within the upper abdomen.     DICTATED:   04/05/2020  EDITED/ls :   04/05/2020        CT Head Without Contrast Stroke Protocol [[id number]] Collected:  04/05/20 1202     Updated:  04/05/20 1210    Narrative:       EXAMINATION: CT HEAD WO CONTRAST  - 04/05/2020     INDICATION: Generalized weakness. Evaluate for  stroke.     TECHNIQUE: Multiple axial CT imaging is obtained of the head from skull  base to skull vertex without the administration of intravenous contrast.  Stroke protocol.     The radiation dose reduction device was turned on for each scan per the  ALARA (As Low as Reasonably Achievable) protocol.     COMPARISON: 03/02/2020     FINDINGS: The brain parenchyma is unremarkable in appearance.  Low-density area identified in the right basal ganglia possibly from  prior insult. This is stable and unchanged when compared to the prior  study. No hemorrhage or hydrocephalus. No mass, mass effect or midline  shift. No abnormal extra-axial fluid collection identified. Bony  structures reveal no evidence of osseous abnormality. Visualized  paranasal sinuses are clear. The mastoid air cells are patent.       Impression:       Old insult to the right basal ganglia, small in size with no  acute intracranial abnormality identified.     Examination was performed 04/05/2020 at 11:46 AM and examination results  were given to the ER physician at the scanner by Dr. Lindsay on 04/05/2020  at 11:48 AM.     DICTATED:   04/05/2020  EDITED/ls :   04/05/2020               Results for orders placed during the hospital encounter of 01/29/20   Adult Transthoracic Echo Complete W/ Cont if Necessary Per Protocol    Narrative · Estimated EF = 50-55%.  · Left ventricular systolic function is low normal.  · Mild tricuspid valve regurgitation is present.  · Calculated right ventricular systolic pressure from tricuspid   regurgitation is 27 mmHg.  · The global longitudinal strain is calculated at -17 which is slightly   less than normal (normal is -20)          Assessment/Plan   Assessment & Plan     Active Hospital Problems    Diagnosis POA   • **Acute respiratory failure with hypoxia and hypercapnia (CMS/HCC) [J96.01, J96.02] Yes     Priority: Medium   • Acute hypoxemic respiratory failure (CMS/HCC) [J96.01] Yes   • CKD (chronic kidney disease)  stage 3, GFR 30-59 ml/min (CMS/HCC) [N18.3] Yes   • Malignant pleural effusion [J91.0] Yes   • Metastatic cancer (CMS/HCC) [C79.9] Yes   • Bone metastases (CMS/HCC) [C79.51] Yes   • Renal cancer, left (CMS/HCC) [C64.2] Yes   • Lumbar spine tumor [D49.2] Yes       Mr. Evans is an unfortunate 54-year-old male with widely metastatic renal cell carcinoma followed by Dr. Coffey and now on second line chemotherapy with Gemzar and carboplatin.  I personally reviewed chest CT which shows left greater than right pleural effusions with worsening lymphangitic spread compared to March.  He has acute hypoxic and hypercarbic respiratory failure as a result of this.  I discussed with him and his sister via phone (Felecia GALVEZ with hospitalist) that he is a very ill.  Could have worsening tonight.  Based on my discussion with him and his sister we have opted for a DO NOT RESUSCITATE and overall goals towards comfort which I think is appropriate.  I have a low suspicion for COVID based on history, CT, and risk factors but cannot completely exclude.  He has been placed on the COVID rule out floor.  Will defer to day team to determine testing.  Pending that decision the patient will need a thoracentesis for symptoms, Dr. Coffey consult, and palliative care consult.  For now we will provide him oxygen and symptom support with morphine, Phenergan.  Agree with the Lasix given him by the emergency room.  Creatinine is on the upper limits of his recent baseline.  I will defer blood work in the morning due to general comfort based approach.      Addendum: Discussed with Dr. Zavala, will sent COVID test to [facility] in AM.    COVID-19 RISK SCREEN     7. Has the patient had close contact without PPE with a lab confirmed COVID-19 (+) person or a person under investigation (PUI) for COVID-19 infection?  -- No     8. Has the patient had respiratory symptoms, worsened/new cough and/or SOA, unexplained fever, or sudden loss of  smell and/or taste in the past 7 days? --  Yes    9. Does the patient have baseline higher exposure risk such as working in healthcare field, currently residing in healthcare facility, or ongoing hemodialysis?  --  No         Critical Care time spent in direct patient care:    40 minutes (excluding procedure time, if applicable) including high complexity decision making to assess and treat vital organ system failure in this individual who has impairment of one or more vital organ systems such that there is a high probability of imminent or life threatening deterioration in the patient’s condition and failure to initiate the above interventions on an urgent basis would likely result in sudden, clinically significant or life threatening deterioration in the patient's condition.  16:08        DVT prophylaxis:  lovenox    CODE STATUS:    Code Status and Medical Interventions:   Ordered at: 04/05/20 5981     Level Of Support Discussed With:    Patient    Health Care Surrogate     Code Status:    No CPR     Medical Interventions (Level of Support Prior to Arrest):    Comfort Measures       Admission Status:  I believe this patient meets inpatient criteria based on acute respiratory failure with moderate respiratory distress, interval worsening of lymphangitic spread of his cancer.  Anticipate him being in the hospital least 2 nights.    Electronically signed by Jose Vu MD, 04/05/20, 3:52 PM.      Electronically signed by Jose Vu MD at 04/05/20 1200

## 2020-04-09 NOTE — OUTREACH NOTE
Medical Week 1 Survey      Responses   [facility] patient discharged from?  [place]   COVID-19 Test Status  Negative   Does the patient have one of the following disease processes/diagnoses(primary or secondary)?  Other   Is there a successful TCM telephone encounter documented?  No   Week 1 attempt successful?  No   Unsuccessful attempts  Attempt 1          Shashi Luciano RN

## 2020-04-10 NOTE — OUTREACH NOTE
Medical Week 1 Survey      Responses   [facility] patient discharged from?  [place]   COVID-19 Test Status  Negative   Does the patient have one of the following disease processes/diagnoses(primary or secondary)?  Other   Is there a successful TCM telephone encounter documented?  No   Week 1 attempt successful?  No   Unsuccessful attempts  Attempt 2          Shashi Luciano RN

## 2020-04-11 NOTE — OUTREACH NOTE
Medical Week 1 Survey      Responses   [facility] patient discharged from?  [place]   COVID-19 Test Status  Negative   Does the patient have one of the following disease processes/diagnoses(primary or secondary)?  Other   Is there a successful TCM telephone encounter documented?  No   Week 1 attempt successful?  No   Unsuccessful attempts  Attempt 3          Rosita Valderrama LPN

## 2020-04-13 NOTE — TELEPHONE ENCOUNTER
I called and spoke with the patient's sister to check on his status.  Dr. Coffey had began the discussion Friday regarding possibly referral to hospice.  Per Felecia, the patient is not ready for hospice.  He has an appointment Wednesday for second opinion at .  She will be back in contact with us after that.  She states that he does have shortness of breath with activity and they are draining pleural fluid daily from his Pleurx drain, had 500 mL's today otherwise he is stable with no new complaints.

## 2020-04-13 NOTE — TELEPHONE ENCOUNTER
Called to discuss apt options with patient for tomorrows visit. Could not reach pt and spoke with sister, emergency contact. Explained all apts are being changed to telehealth (video or telephone). Pt does not active Mychart and sister asks for telephone tomorrow. Instructed someone would call about 10 mins before and check them in and then transfer to Dr. Pearce. Also informed there was a cancellation and ok'd with them to move pt apt up to 2. Sister v/u on all instructions.

## 2020-04-14 NOTE — PROGRESS NOTES
This provider note, based on phone call or other Telemedicine alternative, was created to either supplement or replace provision of in-person palliative care services by a provider (physician or nurse practitioner).  These services were not provided face-to-face due to the current recommendations of the CDC, restrictions implemented by the healthcare clinic or hospital which houses the clinic, or by request of the patient/family during the 2020 COVID-19 pandemic.    Patient verbally consented to this telehealth encounter.    Background:  Referring/Oncology:  Jose Coffey MD and Tila GALVEZ  Primary Care:  Juan Mccarty MD  NS:  Michael Rizo MD     54yowm with stage IV renal cell carcinoma metastatic to mediastinal LNs, lung nodules, rib metastases, liver, bilateral adrenal glands, and brain (R frontotemporal), L2 with extension to paraspinal soft tissues and posterior extradural spinal canal with compression of thecal sac at L2  Dx 12/19/19 after persistent and rapidly escalating back pain that he contributed to MSK pains related to horse riding.      Treatment history:   S/p SRT to L spine metastasis 1/9/20.  Axitinib, Pembro. Received Pembro Cycle #2 on 2/3/2020. Stopped due to respiratory SE.    Hospitalization 3/2-3/4 for about progressive dyspnea noted at last visit.  Found to have bilateral pleural effusions and likely lymphangitic spread to chest.  Underwent thoracentesis inpatient, removed 600mL form left chest and 10mL from right chest.  Treated with steroids as possible drug reaction to his cancer treatment.      Persistent SOA.  Hospitalized 4/5-4/7.  Had palliative L PleurX catheter placed. Discharged home on 5-6L NC.  Noted conversations with inpatient palliative team and with Dr. Coffey re: hospice referral.   Noted plan for second opinion at  per phone call with oncology and pt's sister.    Advance care plan:    -  Healthcare decision making plan: Sister Felecia Sandoval is [facility],   -  [facility]  discussions thus far:  No CPR order noted 4/7/20.    Interim history (per patient):  Coughing fits better with cough medicine (guaifenesin with codeine, takes BID (not the TID max PRN allowed).  Draining fluid - yesterday 500mL, which was first in 6 days (drained 1100mL on the day of discharge).  He can notice when he cannot lie back flat.  He is now on 2L with sats 94-97%.  No further diaphoresis and passing out spells like he showed at last palliative appt.    Back pain 2/2 coughing fits managed with cough syrup.  No Tramadol nor Oxycodone in 2-3 days.      Daily satisfactory BMs.    Nausea managed with Essiac tea.     Dr. Summers is considering clinical trial.  Next appt is tomorrow for bloodwork to see if he qualifies for trial.      Ambulating without assist device, was sent home with rollator.  Difficult to get up and down the stairs.  DME future needs.  He is sleeping on couch currently.     Lives with sister, her  and 3 teenagers so has ample caregiver support.      BROWN-7:         PHQ-9:  PHQ-2/PHQ-9 Depression Screening 3/12/2020   Little interest or pleasure in doing things 1   Feeling down, depressed, or hopeless 0   Trouble falling or staying asleep, or sleeping too much 0   Feeling tired or having little energy 2   Poor appetite or overeating 0   Feeling bad about yourself - or that you are a failure or have let yourself or your family down 0   Trouble concentrating on things, such as reading the newspaper or watching television 0   Moving or speaking so slowly that other people could have noticed. Or the opposite - being so fidgety or restless that you have been moving around a lot more than usual 0   Thoughts that you would be better off dead, or of hurting yourself in some way 0   Total Score 3        ECOG: (2) Ambulatory and capable of self care, unable to carry out work activity, up and about > 50% or waking hours    Physical Exam:  General:  Alert, conversant.  Coughing noted  Pulm:  Speaks  "short sentences but no audible gasping.  Coughing noted.  Psych:  Short sentences noted.  Minimal responses \"I'm fine .....  Yes....... No.\"  Neuro:  No slurred speech    Medication count (per patient/caregiver):  Oxycodone 5mg #14 of 18 filled 4/7/20  Tramadol 50mg #62  Robitussin AC - has 1.8 refills of 300mL, has filled 2 times.     UDS:  THC, Screen, Urine   Date Value Ref Range Status   02/13/2020 Negative Negative Final     Phencyclidine (PCP), Urine   Date Value Ref Range Status   02/13/2020 Negative Negative Final     Cocaine Screen, Urine   Date Value Ref Range Status   02/13/2020 Negative Negative Final     Methamphetamine, Ur   Date Value Ref Range Status   02/13/2020 Negative Negative Final     Opiate Screen   Date Value Ref Range Status   02/13/2020 Negative Negative Final     Amphetamine Screen, Urine   Date Value Ref Range Status   02/13/2020 Negative Negative Final     Benzodiazepine Screen, Urine   Date Value Ref Range Status   02/13/2020 Negative Negative Final     Tricyclic Antidepressants Screen   Date Value Ref Range Status   02/13/2020 Negative Negative Final     Methadone Screen, Urine   Date Value Ref Range Status   02/13/2020 Negative Negative Final     Barbiturates Screen, Urine   Date Value Ref Range Status   02/13/2020 Negative Negative Final     Oxycodone Screen, Urine   Date Value Ref Range Status   02/13/2020 Negative Negative Final     Propoxyphene Screen   Date Value Ref Range Status   02/13/2020 Negative Negative Final     Buprenorphine, Screen, Urine   Date Value Ref Range Status   02/13/2020 Negative Negative Final               CONTROLLED SUBSTANCE TRACKING 1/21/2020 2/13/2020 3/12/2020 4/13/2020   Last Marco Antonio 1/20/2020 2/12/2020 3/11/2020 4/13/2020   Report Number [id number]   Last UDS - 1/21/2020 2/13/2020 2/13/2020   Last Controlled Substance Agreement - 1/21/2020 1/21/2020 1/21/2020   ORT Initial Risk Score 1 1 1 1   Prior UDT result - Expected " Expected Expected   Pill count Did not bring Did not bring Did not bring -   Disposal Education and Agreement [id number]       Universal precautions:    ORT risk score (ORT-OUD and/or COMM):  low  Aberrant behavior:  none  Indication:  Persistent cough  OME:  N/a - codeine cough syrup  Naloxone prescribed:  No     Assessment/Plan   Alphonso was seen today for follow-up.    Diagnoses and all orders for this visit:    Cough, persistent    Metastatic cancer (CMS/HCC)    Renal cancer, left (CMS/HCC)    Cough  -     guaiFENesin-Codeine 100-6.3 MG/5ML solution; Take 10 mL by mouth 3 (Three) Times a Day As Needed (cough).                 Plan:  1. Refill: Called [facility] pharmacy.  Noted quantity limit for Robitussin AC.  I called back pt and sister.  They will pay out of pocket $16/bottle of 120mL.  Discussed restarting oxycodone 2.5mg q6h as when he was in hospital for cough suppression.  Instructed to call if they decide to resume that, for refills.  Discussed changing cough syrup to be covered - Robitussin DM or codeine tabs or Tylenol #3 as options if codeine deemed most effective for his cough  2. Pt advised to call our clinical line [phone] for any symptom or side effect concerns, new clinical needs, or for refill needs per usual clinic policies.    FOLLOW UP:  3 weeks.  Discussed that if he transfers care to , we will work with that multidisciplinary team to meet DME and psychosocial needs.  Unclear if clinical trial treatment will occur at  vs protocol given at [facility].      Call/encounter time: 24 minutes    Unable to complete visit using a video connection to the patient. A phone visit was used to complete this visits. Total time of discussion was 24 minutes.    Encounter technology:  phone

## 2020-04-14 NOTE — OUTREACH NOTE
Medical Week 1 Survey      Responses   [facility] patient discharged from?  [place]   COVID-19 Test Status  Negative   Does the patient have one of the following disease processes/diagnoses(primary or secondary)?  Other   Is there a successful TCM telephone encounter documented?  No   Week 1 attempt successful?  Yes   Call start time  1304   Call end time  1311   Discharge diagnosis  Negative Covid-19 Acute Hypoxia Resp Failure/ Met Renal CA   Is patient permission given to speak with other caregiver?  Yes   List who call center can speak with  sister Izquierdo   Meds reviewed with patient/caregiver?  Yes   Is the patient taking all medications as directed (includes completed medication regime)?  Yes   Does the patient have a primary care provider?   Yes   Does the patient have an appointment with their PCP within 7 days of discharge?  Yes [Sister reports that patient is having telemedicine follow ups. ]   Comments regarding PCP  PCP Dr Juan Mccarty.    Has the patient kept scheduled appointments due by today?  Yes   What is the Home health agency?   [facility] dc'd. Sister reports that she is a nurse and HH not needed.    Has home health visited the patient within 72 hours of discharge?  N/A   Psychosocial issues?  No   Comments  Sister reports patient afebrile and that oxygen saturation also monitored. She reports that patient is down to 1-2 liters with keeping pleurx catheter drained.    Did the patient receive a copy of their discharge instructions?  Yes   Nursing interventions  Reviewed instructions with patient [Sister]   What is the patient's perception of their health status since discharge?  Improving   Is the patient/caregiver able to teach back signs and symptoms related to disease process for when to call PCP?  Yes   Is the patient/caregiver able to teach back signs and symptoms related to disease process for when to call 911?  Yes   Is the patient/caregiver able to teach back the hierarchy of who to  call/visit for symptoms/problems? PCP, Specialist, Home health nurse, Urgent Care, ED, 911  Yes   Week 1 call completed?  Yes          Stacie Posadas RN

## 2020-04-16 NOTE — OUTREACH NOTE
Medical Week 2 Survey      Responses   [facility] patient discharged from?  [place]   COVID-19 Test Status  Negative   Does the patient have one of the following disease processes/diagnoses(primary or secondary)?  Other   Week 2 attempt successful?  No          Steffi Chacko LPN

## 2020-04-17 NOTE — TELEPHONE ENCOUNTER
I spoke with the patient's Sister Felecia, he has been accepted into a clinical trial at the [facility] hopefully to start next week.  She did mention that he had CT angiogram of the chest Wednesday and had bilateral PEs.  I asked her to be sure and let us know if they had any needs in the future, will make his follow-up as needed as he will receive care at  during clinical trial.

## 2020-04-19 NOTE — OUTREACH NOTE
Medical Week 2 Survey      Responses   [facility] patient discharged from?  [place]   COVID-19 Test Status  Negative   Does the patient have one of the following disease processes/diagnoses(primary or secondary)?  Other   Week 2 attempt successful?  Yes   Call start time  1146   Unsuccessful attempts  Attempt 1   Discharge diagnosis  Negative Covid-19 Acute Hypoxia Resp Failure/ Met Renal CA   Call end time  1148   Is patient permission given to speak with other caregiver?  Yes   Person spoke with today (if not patient) and relationship  Felecia/ sister   Meds reviewed with patient/caregiver?  Yes   Is the patient having any side effects they believe may be caused by any medication additions or changes?  No   Does the patient have all medications ordered at discharge?  Yes   Is the patient taking all medications as directed (includes completed medication regime)?  Yes   Does the patient have a primary care provider?   Yes   Does the patient have an appointment with their PCP within 7 days of discharge?  Yes   Comments regarding PCP  PCP Dr Juan Mccarty.    Has the patient kept scheduled appointments due by today?  Yes   What is the Home health agency?   [facility] dc'd. Sister reports that she is a nurse and HH not needed.    What DME was ordered?  Home O2 and Rollator to [facility]   Has all DME been delivered?  Yes   Comments  Sister reports he is afebrile and his o2 saturations are being closely watched. She reports he is doing much better.    Did the patient receive a copy of their discharge instructions?  Yes   Nursing interventions  Reviewed instructions with patient   What is the patient's perception of their health status since discharge?  Improving   Is the patient/caregiver able to teach back signs and symptoms related to disease process for when to call PCP?  Yes   Is the patient/caregiver able to teach back signs and symptoms related to disease process for when to call 911?  Yes   Is the  patient/caregiver able to teach back the hierarchy of who to call/visit for symptoms/problems? PCP, Specialist, Home health nurse, Urgent Care, ED, 911  Yes   Additional teach back comments  Pt doing better. Will keep up all f/u appts.   Week 2 Call Completed?  Yes          Shashi Luciano RN

## 2020-04-22 NOTE — OUTREACH NOTE
Medical Week 3 Survey      Responses   [facility] patient discharged from?  [place]   COVID-19 Test Status  Negative   Does the patient have one of the following disease processes/diagnoses(primary or secondary)?  Other   Week 3 attempt successful?  No   Unsuccessful attempts  Attempt 1          Stacie Posadas RN

## 2020-04-29 NOTE — OUTREACH NOTE
Medical Week 4 Survey      Responses   [facility] patient discharged from?  [place]   COVID-19 Test Status  Negative   Does the patient have one of the following disease processes/diagnoses(primary or secondary)?  Other   Week 4 attempt successful?  No          Rosita Valderrama LPN

## 2020-05-12 PROBLEM — R60.0 BILATERAL LEG EDEMA: Status: ACTIVE | Noted: 2020-01-01

## 2020-05-12 PROBLEM — R06.01 ORTHOPNEA: Status: ACTIVE | Noted: 2020-01-01

## 2020-05-12 NOTE — PROGRESS NOTES
This provider note, based on phone call or other Telemedicine alternative, was created to either supplement or replace provision of in-person palliative care services by a provider (physician or nurse practitioner).  These services were not provided face-to-face due to the current recommendations of the CDC, restrictions implemented by the healthcare clinic or hospital which houses the clinic, or by request of the patient/family during the 2020 COVID-19 pandemic.    Patient verbally consented to this telehealth encounter.    Background:  Referring/Oncology:  Jose Coffey MD and Tila GALVEZ, transferred to Basilio Summers MD and Abimael Magallanes MD ([facility])  Primary Care:  Juan Mccarty MD  NS:  Michael Rizo MD     54yowm with stage IV renal cell carcinoma metastatic to mediastinal LNs, lung nodules, rib metastases, liver, bilateral adrenal glands, and brain (R frontotemporal), L2 with extension to paraspinal soft tissues and posterior extradural spinal canal with compression of thecal sac at L2  Dx 12/19/19 after persistent and rapidly escalating back pain that he contributed to MSK pains related to horse riding.      Treatment history:   S/p SRT to L spine metastasis 1/9/20.  Axitinib, Pembro. Received Pembro Cycle #2 on 2/3/2020. Stopped due to respiratory SE.     Hospitalization 3/2-3/4 for about progressive dyspnea noted at last visit.  Found to have bilateral pleural effusions and likely lymphangitic spread to chest.  Underwent thoracentesis inpatient, removed 600mL form left chest and 10mL from right chest.  Treated with steroids as possible drug reaction to his cancer treatment.       Persistent SOA.  Hospitalized 4/5-4/7.  Had palliative L PleurX catheter placed. Discharged home on 5-6L NC.  Noted conversations with inpatient palliative team and with Dr. Coffey re: hospice referral.   Pt then sought second opinion at [facility] and was enrolled in a clinical trial.    Advance care plan:    -  Healthcare decision  "making plan: Sister Felecia Sandoval is [facility],   -  MOST discussions thus far:  No CPR order noted 4/7/20.    Interim history (chart review):  Oncology phone contact noted.  Pt's sister reported that at CTA done there revealed bilateral PEs.    Interim history (per patient):  Had first treatment - had cold chills, nausea, fever for 5 straight days.  Premedication with last one stopped all the side effects he experienced with the first.      Coughing fits - taking the cough syrup BID.  Not taking the oxycodone.      Function:  Increasing DUGAN on 5L NC.  Weight stable 88.1kg (192 lbs).  Feels that muscle mass \"look normal.\"  Is retaining fluid \"probably 10lbs\" with edema 2-3+ pitting edema, worse over past 3-4 weeks, was told his albumin was low.  Worsening despite Tedhose, leg elevation, and PleurX draining 1L every other day.    Distress:  Difficulty getting supplies for PleurX.  Used last drainage kit yesterday.  Only 10 drainage kits per month.  No home health services.  Sister has reached out to [facility] onc SW and spoke with Brissa GALVEZ with Dr. Magallanes.  For now, only options are to go to ED to drain pleural fluid.      ECOG: (3) Capable of limited self-care, confined to bed or chair > 50% of waking hours    Physical Exam:  General:  Coughing,  Minimally conversant (usual) with minimal answers \"fine.....  Ok....\" and defers to sister for most of conversation.  PULM:  Coughing with speech noted.  No gasping noted  Neuro:  Alert, answered phone with normal speech and tone    UDS:  THC, Screen, Urine   Date Value Ref Range Status   02/13/2020 Negative Negative Final     Phencyclidine (PCP), Urine   Date Value Ref Range Status   02/13/2020 Negative Negative Final     Cocaine Screen, Urine   Date Value Ref Range Status   02/13/2020 Negative Negative Final     Methamphetamine, Ur   Date Value Ref Range Status   02/13/2020 Negative Negative Final     Opiate Screen   Date Value Ref Range Status   02/13/2020 Negative " Negative Final     Amphetamine Screen, Urine   Date Value Ref Range Status   02/13/2020 Negative Negative Final     Benzodiazepine Screen, Urine   Date Value Ref Range Status   02/13/2020 Negative Negative Final     Tricyclic Antidepressants Screen   Date Value Ref Range Status   02/13/2020 Negative Negative Final     Methadone Screen, Urine   Date Value Ref Range Status   02/13/2020 Negative Negative Final     Barbiturates Screen, Urine   Date Value Ref Range Status   02/13/2020 Negative Negative Final     Oxycodone Screen, Urine   Date Value Ref Range Status   02/13/2020 Negative Negative Final     Propoxyphene Screen   Date Value Ref Range Status   02/13/2020 Negative Negative Final     Buprenorphine, Screen, Urine   Date Value Ref Range Status   02/13/2020 Negative Negative Final     Palliative Performance Scale  Palliative Performance Scale Score: 60%    [place] Symptom Assessment System Revised  Pain Score: no pain   ESAS Tiredness Score: 8  ESAS Nausea Score: 1  ESAS Depression Score: No depression  ESAS Anxiety Score: No anxiety  ESAS Drowsiness Score: 9  ESAS Lack of Appetite Score: No lack of appetite  ESAS Wellbeing Score: Best wellbeing  ESAS Dyspnea Score: 9  ESAS Source of Information: patient    Controlled medication tracking flowsheet reviewed.  See attached     Assessment/Plan   Alphonso was seen today for appointment and follow-up.    Diagnoses and all orders for this visit:    Dyspnea on exertion  -     Ambulatory Referral to Home Health    Orthopnea  -     Hospital Bed    Malignant pleural effusion    Hypoxia    Cough, persistent    Renal cancer, left (CMS/HCC)    Elevated serum creatinine    Metastatic cancer (CMS/HCC)    Other orders  -     furosemide (LASIX) 20 MG tablet; Take 1 tablet by mouth Daily As Needed (leg swelling) for up to 10 doses. Only 3 days per week                   Plan:  1. Refill: no needs today  2. Care coordination:  I called [facility] [phone] and was directed to [phone],  spoke with Marina in Dr. Magallanes's office, then got generic voicemail box.  Gave number to palliative RN to coordinate care  3. Urgent referral to [facility] placed to assist with hospital bed, pleural drain equipment needs.  4. Will investigate with [facility] and [facility] Navigators if patient can receive both hospice care and clinical trial treatments at .  Will need to get a hold of someone at  to initiate conversation.  5. Pt advised to call our clinical line [phone] for any symptom or side effect concerns, new clinical needs, or for refill needs per usual clinic policies.    FOLLOW UP:  2-3 weeks    Call/encounter time: 20 min    Unable to complete visit using a video connection to the patient. A phone visit was used to complete this visits. Total time of discussion was 20 minutes.    Encounter technology:  phone

## 2020-05-12 NOTE — PROGRESS NOTES
You have chosen to receive care through a telephone visit. Do you consent to use a telephone visit for your medical care today? Yes    Pt contacted for follow up for symptom management of current cancer dx & trmt. Pt currently in research trial at . Receives infusions 1 wk on and two weeks off. Has new scans next week.  Pt's only complaint today is worsening shortness of breath.  Wt stable  - reported at 88.1kg at last  visit (up from last office visit). Pt pain and nausea currently controlled well on regimen of tramadol and zofran. Transferred call to provider.

## 2020-05-12 NOTE — TELEPHONE ENCOUNTER
Spoke with Marisabel.  Patient had pleurx drain placed during admission in April.  Marisabel states his supplies requires a prior authorization.  Reviewed records from admission.  It was ordered by hospitalist and placed by radiologist.  I cannot find the order for supplies to see who ordered them.  Marisabel will contact Fatemeh to see who ordering physican was.  Suggested she also contact patient's PCP.  Verbalized understanding.

## 2020-05-12 NOTE — PROGRESS NOTES
Pt is currently in research trial at  under Dr. Abimael Magallanes. Called and left message for Terrie Rdz RN to call back for care coordination for patient. [phone]

## 2020-05-12 NOTE — TELEPHONE ENCOUNTER
Marisabel with [employer]. ()    Needs to speak with a nurse regarding supplies for the patient.    [phone]

## 2020-05-18 NOTE — PROGRESS NOTES
After playing phone tag with BLAIRE Rubio @ Dr. Magallanes's office, finally contacted today. RN unaware of details about pt's research trial.  Will send message to Research coordinator to contact.  Brissa, Research RN called back.  Stated that pt is in a research study where he comes for 5 days of infusions every 3 weeks. She states that the drugs, scans and part of the lab work is covered by the clinical study. However, she stated that one of the clinical trial regulations is that the patient cannot be enrolled in hospice care.

## 2020-05-19 PROBLEM — J96.02 ACUTE RESPIRATORY FAILURE WITH HYPERCAPNIA (HCC): Status: ACTIVE | Noted: 2020-01-01

## 2020-05-19 PROBLEM — I26.99 BILATERAL PULMONARY EMBOLISM (HCC): Status: ACTIVE | Noted: 2020-01-01

## 2020-05-19 NOTE — H&P
[facility] Medicine Services  HISTORY AND PHYSICAL    Patient Name: Alphonso Evans  : 1965  MRN: [id number]  Primary Care Physician: Juan Mccarty MD  Date of admission: 2020      Subjective   Subjective     Chief Complaint:  Mental status changes    HPI:  Alphonso Evans is a 54 y.o. male with a history of a metastatic renal cell carcinoma initially diagnosed in 2019.  Known metastatic disease to the bones, liver, lymphangitic spread in the lungs.  Disease progression despite treatment with Dr. Coffey.  Admitted in April and had a Pleurx catheter placed on the left side for recurrent malignant pleural effusion.  After that has been evaluated at [facility] and has been enrolled in a clinical trial.  Staging scans at that time incidentally found bilateral pulmonary emboli and was started on Xarelto.  Over the last week breathing has become a little worse.  His sister has been draining his Pleurx catheter more often to the point where she is draining a liter off a day.  Within the last 24 hours condition is gotten worse where he has become more confused, appears to have more pain, and unable to walk.  Decision was made to bring him in this morning.  ABG shows significant mixed hypoxic and hypercapnic respiratory failure and he has been placed on BiPAP and is may be slightly more arousable since that time.  Chest x-ray reveals worsening right-sided pleural effusion.  No known fevers, no leukocytosis.  Tested negative for COVID in April.  He received doses of vancomycin and Zosyn in the emergency room.  He is being admitted for further evaluation.    Review of Systems   Unable to obtain secondary to current mental status    Personal History     Past Medical History:   Diagnosis Date   • Cancer (CMS/HCC)     Renal, Spine, Brain   • Hypertension        Past Surgical History:   Procedure Laterality Date   • CYBERKNIFE Right 2020    Right  paraspinal lesion behind the L2 vertebral body- Dr. Michael Rizo    • KNEE SURGERY Left     x 3   • ROTATOR CUFF REPAIR Right 2014       Family History: family history includes Cancer in his father; Heart disease in his father. Otherwise pertinent FHx was reviewed and unremarkable.     Social History:  reports that he has never smoked. He has never used smokeless tobacco. He reports that he does not drink alcohol or use drugs.  Social History     Social History Narrative    Lives in [place]. 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        Medications:  Available home medication information reviewed.    No Known Allergies    Objective   Objective     Vital Signs:   Temp:  [98.4 °F (36.9 °C)] 98.4 °F (36.9 °C)  Heart Rate:  [] 103  Resp:  [14-25] 16  BP: (112-133)/(66-94) 127/91        Physical Exam   Constitutional: arouses to loud verbal stim, ill appearing  Eyes: PERRLA, sclerae anicteric, no conjunctival injection  HENT: NCAT, mucous membranes moist  Neck: Supple, no thyromegaly, no lymphadenopathy, trachea midline  Respiratory: Diminished breath sounds bilaterally, is on BiPAP  Cardiovascular: Tachycardic, no murmurs, rubs, or gallops  Gastrointestinal: Positive bowel sounds, soft, nontender, nondistended  Musculoskeletal: Legs are wrapped bilaterally, 3+ pitting edema  Psychiatric: Unable to assess  Neurologic: Intermittently follows commands, no obvious focal deficits  Skin: No rashes      Results Reviewed:  I have personally reviewed current lab and radiology data.    Results from last 7 days   Lab Units 05/19/20  0916  05/19/20  0909   WBC 10*3/mm3 12.24*  --   --    HEMOGLOBIN g/dL 12.1*  --   --    HEMOGLOBIN, POC   --    < >  --    HEMATOCRIT % 39.0  --   --    HEMATOCRIT POC   --    < >  --    PLATELETS 10*3/mm3 234  --   --    INR   --   --  1.1    < > = values in this interval not displayed.     Results from last 7 days   Lab Units 05/19/20  0916 05/19/20  0915   SODIUM  mmol/L  --  145   POTASSIUM mmol/L  --  5.5*   CHLORIDE mmol/L  --  102   CO2 mmol/L  --  34.0*   BUN mg/dL  --  28*   CREATININE mg/dL  --  1.17   GLUCOSE mg/dL  --  118*   CALCIUM mg/dL  --  9.3   ALT (SGPT) U/L  --  20   AST (SGOT) U/L  --  18   TROPONIN T ng/mL  --  <0.010   LACTATE mmol/L 2.5*  --    PROCALCITONIN ng/mL  --  0.28*     Estimated Creatinine Clearance: 77.2 mL/min (by C-G formula based on SCr of 1.17 mg/dL).  Brief Urine Lab Results  (Last result in the past 365 days)      Color   Clarity   Blood   Leuk Est   Nitrite   Protein   CREAT   Urine HCG        04/05/20 1527 Yellow Clear Negative Negative Negative 30 mg/dL (1+)             Imaging Results (Last 24 Hours)     Procedure Component Value Units Date/Time    XR Chest 1 View [[id number]] Collected:  05/19/20 1010     Updated:  05/19/20 1011    Narrative:          EXAMINATION: XR CHEST 1 VW-      INDICATION: ignacio fever weakness, shortness of breath     COMPARISON: 04/05/2020     FINDINGS: Portable chest reveals Pleurx catheter identified on the left.  There is development of a moderate to large right pleural effusion.  There is airspace disease seen diffusely throughout the lung fields  bilaterally representing patient's known lymphangitic spread of  malignancy. Degenerative changes seen within the spine.           Impression:       Development of a moderate to large right pleural effusion.  Pleurx catheter on the left with small pleural effusion present.  Increased markings seen diffusely throughout the lung fields bilaterally  slightly worsened in the interval.          CT Abdomen Pelvis Without Contrast [[id number]] Collected:  05/19/20 0938     Updated:  05/19/20 0941    Narrative:       EXAMINATION: CT ABDOMEN PELVIS WO CONTRAST-      INDICATION: TENDERNESS abdominal tenderness, history of renal cell  malignancy     TECHNIQUE: Multiple axial CT imaging is obtained of the abdomen and  pelvis without the ministration of intravenous contrast.        The radiation dose reduction device was turned on for each scan per the  ALARA (As Low as Reasonably Achievable) protocol.     COMPARISON: 03/04/2020     FINDINGS: Large pleural effusion is identified on the right with small  pleural effusion on the left. Pleurx catheter is identified left with  extensive nodular densities suggesting lymphangitic spread of malignancy  diffusely throughout the visualized lung fields. Bony structures reveal  degenerative changes seen within the spine. Liver is heterogeneous with  multiple lesions identified throughout suggesting hepatic metastatic  disease. There is adenopathy seen throughout the retroperitoneum.  Abnormal appearance of the adrenal gland on the left. Low-density mass  seen in the fused enlargement of the left kidney with wall thickening  identified of the throat is fashion. There is no abnormality seen within  the pancreas or spleen. Adenopathy throughout the retroperitoneum. Bony  structures reveal degenerative changes seen within the spine and pelvis.  The abdominal portion gastrointestinal tract within normal limits. No  free fluid or free air. Small bilateral inguinal hernias.     Pelvis: Pelvic organs are unremarkable. The pelvic portion the  gastrointestinal tract within normal limits. No free fluid or free air.  No abnormal mass or fluid collections identified.             Impression:       Bilateral pleural effusions with Pleurx catheter identified  on the right. Lymphangitic spread of disease diffusely throughout the  July the lung fields. There is massive abnormality seen within the left  kidney with diffuse enlargement, thickening enteritis/as well as  abnormality and enlargement of the adrenal gland. Adenopathy seen in the  retroperitoneum with diffuse diffuse hepatic metastatic disease.          CT Head Without Contrast [[id number]] Collected:  05/19/20 0933     Updated:  05/19/20 0941    Narrative:       EXAMINATION: CT HEAD WO CONTRAST-       INDICATION: AMS mental status change     TECHNIQUE: Multiple axial CT imaging is obtained of the head from skull  base to skull vertex without the ministration of intravenous contrast.     The radiation dose reduction device was turned on for each scan per the  ALARA (As Low as Reasonably Achievable) protocol.     COMPARISON: NONE     FINDINGS: Old lacunar infarct identified of the right basal ganglia.  There is motion artifact degrading image quality. There is no evidence  of hemorrhage or hydrocephalus. No mass, mass effect, midline shift. No  abnormal extra-axial fluid collections identified. The bony structures  reveal no evidence of osseous abnormality. The visualized paranasal  sinuses are clear. Mastoid air cells are patent.             Impression:       Old lacunar infarct identified in the right basal ganglia.  No acute intracranial abnormality is identified.     Examination was performed 05/09/2020 at 9 0 7:00 AM in examination  results were given to the emergency room physician at time of  performance of the examination at the scanner.              Results for orders placed during the hospital encounter of 01/29/20   Adult Transthoracic Echo Complete W/ Cont if Necessary Per Protocol    Narrative · Estimated EF = 50-55%.  · Left ventricular systolic function is low normal.  · Mild tricuspid valve regurgitation is present.  · Calculated right ventricular systolic pressure from tricuspid   regurgitation is 27 mmHg.  · The global longitudinal strain is calculated at -17 which is slightly   less than normal (normal is -20)          Assessment/Plan   Assessment & Plan     Active Hospital Problems    Diagnosis POA   • **Acute respiratory failure with hypoxia and hypercapnia (CMS/HCC) [J96.01, J96.02] Yes     Priority: High   • Acute respiratory failure with hypercapnia (CMS/HCC) [J96.02] Yes   • Bilateral pulmonary embolism (CMS/HCC) [I26.99] Unknown   • CKD (chronic kidney disease) stage 3, GFR 30-59 ml/min  (CMS/HCC) [N18.3] Yes   • Malignant pleural effusion [J91.0] Yes   • Bone metastases (CMS/HCC) [C79.51] Yes   • Renal cancer, left (CMS/HCC) [C64.2] Yes   • Hypertension [I10] Yes       Mr. Evans is an unfortunate 54-year-old male with widely metastatic renal cell carcinoma.  Previously progressed despite treatment with Dr. Coffey and within the last few weeks has been enrolled in clinical trials at [facility].  Presents with altered mental status, decreased respiratory status, and increasing pain.  ABG shows significant hypoxia and hypercarbia.  I personally reviewed chest x-ray which shows a minimal effusion on the left with Pleurx catheter in place and increasing right sided effusion.  Prior CT scan showed probable lymphangitic spread.  I have low suspicion of pneumonia with no fever or leukocytosis.  I do not think further antibiotics will be of any utility.  Also recently placed on Xarelto for incidentally found bilateral pulmonary emboli.  That will be continued.  Long discussion with his sister who is Felecia Maldonadoshaka GALVEZ regarding goals of care.  He has a DO NOT RESUSCITATE for right now but has wanting to continue to pursue clinical trials.  She understands that he is likely dying and future care is likely futile.  Plan for now is going to be continue on BiPAP and see if he will mental status will improve so he can contribute to the conversation.  Discussed possibility of draining pleural fluid on the right side however will hold off on that for now in order to avoid invasive procedures.  We will consult palliative care to assist with goals of care.  If no significant improvement on BiPAP will likely get hospice consult and he would be appropriate for inpatient hospice.    DVT prophylaxis: Xarelto      CODE STATUS:    Code Status and Medical Interventions:   Ordered at: 05/19/20 1238     Limited Support to NOT Include:    Cardioversion/Defibrillation    Intubation     Level Of Support  Discussed With:    Next of Kin (If No Surrogate)    Health Care Surrogate     Code Status:    No CPR     Medical Interventions (Level of Support Prior to Arrest):    Limited       Admission Status:  I believe this patient meets inpatient criteria due to severity of illness and need for BiPAP support.  Will be in the hospital least 2 midnights.    Electronically signed by Jose Vu MD, 05/19/20, 12:40 PM.

## 2020-05-19 NOTE — ED PROVIDER NOTES
Subjective   Mr. Evans is brought by EMS with a complaint of altered mental status.  Family reported to EMS that since about 6:00 last night he has been lethargic.  A friend of the family advises me that his drain has not had any significant output over the last day.  He has history of widely metastatic renal cell cancer.  He has malignant pleural effusions and has a drain in his left chest.  He reports pain in his left upper quadrant.  He is reportedly on 6 L of oxygen at home.  He was recently found to have bilateral pulmonary emboli at [facility] and has been started on Xarelto.  He recently enrolled in hospice although I see from a telephone call from a couple of days ago that he may be not eligible for hospice as he has enrolled in a clinical trial at [facility].  He is not able to provide much else in the way of history.      History provided by:  EMS personnel and relative  History limited by:  Mental status change  Altered Mental Status   Presenting symptoms: lethargy    Severity:  Moderate  Most recent episode:  Yesterday  Timing:  Constant  Progression:  Worsening  Chronicity:  New  Context: recent illness    Associated symptoms: abdominal pain        Review of Systems   Unable to perform ROS: Mental status change   Gastrointestinal: Positive for abdominal pain.       Past Medical History:   Diagnosis Date   • Cancer (CMS/HCC)     Renal, Spine, Brain   • Hypertension        No Known Allergies    Past Surgical History:   Procedure Laterality Date   • CYBERKNIFE Right 01/09/2020    Right paraspinal lesion behind the L2 vertebral body- Dr. Michael Rizo    • KNEE SURGERY Left     x 3   • ROTATOR CUFF REPAIR Right 2014       Family History   Problem Relation Age of Onset   • Cancer Father    • Heart disease Father        Social History     Socioeconomic History   • Marital status:      Spouse name: Not on file   • Number of children: Not on file   • Years of education: Not  on file   • Highest education level: Not on file   Tobacco Use   • Smoking status: Never Smoker   • Smokeless tobacco: Never Used   Substance and Sexual Activity   • Alcohol use: Never     Frequency: Never   • Drug use: Never   • Sexual activity: Defer   Social History Narrative    Lives in [place]. 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            Objective   Physical Exam   Constitutional: He appears well-developed and well-nourished. He appears lethargic. He appears distressed.   Mr. Escamilla does appear lethargic.  He is sitting upright on the EMS gurney with his eyes open staring straight ahead.  He will occasionally answer yes or no questions.   HENT:   Head: Normocephalic and atraumatic.   Eyes: Pupils are equal, round, and reactive to light. No scleral icterus.   Neck: Normal range of motion. Neck supple. No JVD present.   Cardiovascular: Regular rhythm.   No murmur heard.  He is tachycardic   Pulmonary/Chest: He is in respiratory distress. He has no wheezes. He has no rales.   Respirations are labored, he has somewhat grunting respirations.  EMS advised us that has been his recent baseline according to family however.   Abdominal: There is tenderness. There is guarding.   Belly seems firm and he is very tender to palpate diffusely but more so in the upper abdomen   Musculoskeletal: He exhibits edema.   He has pitting edema of both lower extremities all the way up to his groin.  He has some both ankles.  His toes are protruding from that and are pink and well perfused   Neurological: He appears lethargic.   Cannot get him to follow commands   Skin: Skin is warm. Capillary refill takes less than 2 seconds. He is diaphoretic.   Psychiatric: His mood appears anxious.   Nursing note and vitals reviewed.      Critical Care  Performed by: Michel Campos MD  Authorized by: Jose Vu MD     Critical care provider statement:     Critical care time (minutes):  35    Critical care was  necessary to treat or prevent imminent or life-threatening deterioration of the following conditions:  Circulatory failure, cardiac failure, respiratory failure, sepsis, shock and CNS failure or compromise    Critical care was time spent personally by me on the following activities:  Discussions with consultants, evaluation of patient's response to treatment, examination of patient, review of old charts, re-evaluation of patient's condition, pulse oximetry, ordering and review of radiographic studies, ordering and review of laboratory studies and ordering and performing treatments and interventions               ED Course  ED Course as of May 19 1334   Tue May 19, 2020   0917 I saw Mr. Evans emergently in the CT scanner as the result of a code stroke page.  I interviewed paramedics.  I briefly reviewed his medical records.  I reviewed his CAT scan as it was being done with the radiologist.  There is no evidence of bleed or other acute problem.  He is very tender on palpation of his abdomen so we will scan through his abdomen as well.  Records indicate he is seeing hospice and palliative care.  Records indicate he has not received CPR.    [DT]   0935 Mr. Evans appears more comfortable.  Breathing is still very labored.  Sepsis screen is positive.  Will initiate sepsis antibiotics    [DT]   0958 I reviewed Mr. Evans's ABG with the respiratory therapist.  It shows acute on chronic respiratory acidosis.  We will start BiPAP.    [DT]   1027 Mr. Evans's Sister Felecia has arrived.  She is a nurse and I have spoken with her.  She confirms that he is not to be intubated or receive CPR.  She is in agreement with BiPAP.  She tells me that his drain actually has been putting out quite a bit of fluid.  She tells me the amount of fluid is been gradually increasing and is now up to about a liter a day.    [DT]   1055 I discussed with Dr. Vu who is familiar with Mr. Evans.  He will admit him to the hospital    [DT]       ED Course User Index  [DT] Michel Campos MD                                           MDM  Number of Diagnoses or Management Options  Acute respiratory failure with hypercapnia (CMS/HCC): new and requires workup  Sepsis without acute organ dysfunction, due to unspecified organism (CMS/HCC): new and requires workup     Amount and/or Complexity of Data Reviewed  Clinical lab tests: reviewed and ordered  Tests in the radiology section of CPT®: ordered and reviewed  Obtain history from someone other than the patient: yes  Review and summarize past medical records: yes  Discuss the patient with other providers: yes  Independent visualization of images, tracings, or specimens: yes    Critical Care  Total time providing critical care: 30-74 minutes    Patient Progress  Patient progress: improved      Final diagnoses:   Acute respiratory failure with hypercapnia (CMS/HCC)   Sepsis without acute organ dysfunction, due to unspecified organism (CMS/HCC)            Michel Campos MD  05/19/20 2582

## 2020-05-19 NOTE — PLAN OF CARE
Problem: Palliative Care (Adult)  Intervention: Promote Informed Decision Making and Goal Setting  Flowsheets (Taken 5/19/2020 1312)  Life Transition/Adjustment: decision-making facilitated; palliative care discussed  Note:   Left Palliative brochure in room for Felecia.

## 2020-05-19 NOTE — PROGRESS NOTES
Discharge Planning Assessment  [facility]     Patient Name: Alphonso Evans  MRN: [id number]  Today's Date: 5/19/2020    Admit Date: 5/19/2020    Discharge Needs Assessment     Row Name 05/19/20 1124       Living Environment    Lives With  sibling(s);child(cordell), dependent    Name(s) of Who Lives With Patient  Felecia GarcianingtonLENNY - sister    Current Living Arrangements  home/apartment/condo    Primary Care Provided by  self;other (see comments) Sister and family    Provides Primary Care For  no one    Family Caregiver if Needed  sibling(s)    Family Caregiver Names  Sister - Felecia Sandoval, LENNY    Quality of Family Relationships  helpful;involved;supportive       Resource/Environmental Concerns    Resource/Environmental Concerns  none    Transportation Concerns  car, none       Transition Planning    Patient/Family Anticipated Services at Transition      Transportation Anticipated  family or friend will provide       Discharge Needs Assessment    Readmission Within the Last 30 Days  no previous admission in last 30 days    Concerns to be Addressed  discharge planning;coping/stress;adjustment to diagnosis/illness    Equipment Currently Used at Home  rollator;oxygen    Anticipated Changes Related to Illness  inability to care for self    Discharge Facility/Level of Care Needs  other (see comments) TBD        Discharge Plan     Row Name 05/19/20 1127       Plan    Plan  TBD    Plan Comments  CM obtained information from chart and ED notes. Patient resides in [facility] with his sister, Felecia, her spouse and two children. Patient has a rollator in the home for mobility. Patient recently only capable of limited self-care, confined to bed or chair > 50% of waking hours. Patient is also on home oxygen provided by [employer]. Patient has had HH with [facility] in the past (April). Patient was referred to Palliative care and had a (L) pleurX catheter placed. Dr Coffey was recommending Hospice,  however, patient's family opted for a second opinion and patient is now in a clinical trial receiving care @ . One of the clinical trial regulations is that the patient cannot be enrolled in Hospice care during the clinical trial. Discharge plan is pending. CM will continue to follow.     Final Discharge Disposition Code  30 - still a patient               Demographic Summary     Row Name 05/19/20 1122       General Information    Arrived From  home    Referral Source  emergency department    Reason for Consult  discharge planning    Preferred Language  English     Used During This Interaction  no    General Information Comments  PCP: Juan Mccarty       Contact Information    Contact Information Comments  Sister - Felecia Sandoval        Functional Status     Row Name 05/19/20 1123       Functional Status    Usual Activity Tolerance  fair    Current Activity Tolerance  poor       Functional Status, IADL    Medications  assistive equipment and person    Meal Preparation  assistive equipment and person    Housekeeping  assistive equipment and person    Laundry  assistive equipment and person    Shopping  assistive equipment and person       Employment/    Employment Status  disabled                Grace Mao

## 2020-05-19 NOTE — PROGRESS NOTES
Sister spoke with his  oncologist who says worsening resp status could be related to cytokine storm they have sometimes seen with the chemo from his clinical trial.  Recommended high dose steroids.  At this point, little downside so will start solumedrol 125mg IV q12.  Partner can discuss with oncologist tomrorkayla if needed.    Electronically signed by Jose Vu MD, 05/19/20, 7:12 PM.

## 2020-05-19 NOTE — PLAN OF CARE
Problem: Patient Care Overview  Goal: Interprofessional Rounds/Family Conf  Outcome: Ongoing (interventions implemented as appropriate)  Flowsheets (Taken 5/19/2020 1359)  Participants: advanced practice nurse; nursing  Note:   New Palliative Team consult from Dr. Jose Vu on 5/19/20 at 1312 for GOC/ACP and seen by Palliative on 5/18/20 at 1345. Patient struggling with his breathing, on BiPaP, he did wake up and answer questions. States he can't breathe, anxious, denies pain. He asked me to call his sister and have her to come in. LENNY Marcial saw patient and will call sister and discuss GOC/ACP.

## 2020-05-19 NOTE — CONSULTS
Palliative Care Initial Consult   Attending Physician: Jose Vu MD  Referring Provider: Jose Yu MD    Reason for Referral:  Goals of Care  Advanced Care Planning    Code Status:   Code Status and Medical Interventions:   Ordered at: 05/19/20 1238     Limited Support to NOT Include:    Cardioversion/Defibrillation    Intubation     Level Of Support Discussed With:    Next of Kin (If No Surrogate)    Health Care Surrogate     Code Status:    No CPR     Medical Interventions (Level of Support Prior to Arrest):    Limited      Advanced Directives: Advance Directive Status: Patient has advance directive, copy in chart   Family/Support: Sister Felecia is health care surrogate.    HPI:   Mr. Evans ia a 53yo male with metastatic renal cell carcinoma. Patient continued to progress despite treatment and sought second opinion. He was recently enrolled in a clinical trial at the [facility]. He originally presented with altered mental status, dyspnea, and increased pain. Labs later noted that he had significant hypoxia and hypercarbia. Prior CT scan showed probale lymphangitic spread. Appears to be low suspicion of PNA due to lack of fever and/o leukocytosis. He was also found to have bilateral pulmonary emboli and was started on Xarelto. With increasing shortness of breath, patient was placed on Bipap. Upon exam patient appeared agitated and extremely dyspneic. He responded to name, however was grunting and shaking his head. When asked about pain he shook head. He did complain that it was difficult to breath. Difficult to tell if frustration was with Bipap mask, confusion, or shortness of air.      ROS: A 14 point system review was completed and found to be negative except what is noted in HPI.      Past Medical History:   Diagnosis Date   • Cancer (CMS/HCC)     Renal, Spine, Brain   • Hypertension      Past Surgical History:   Procedure Laterality Date   • CYBERKNIFE Right 01/09/2020    Right  "paraspinal lesion behind the L2 vertebral body- Dr. Michael Rizo    • KNEE SURGERY Left     x 3   • ROTATOR CUFF REPAIR Right 2014     Social History     Socioeconomic History   • Marital status:      Spouse name: Not on file   • Number of children: Not on file   • Years of education: Not on file   • Highest education level: Not on file   Tobacco Use   • Smoking status: Never Smoker   • Smokeless tobacco: Never Used   Substance and Sexual Activity   • Alcohol use: Never     Frequency: Never   • Drug use: Never   • Sexual activity: Defer   Social History Narrative    Lives in [place]. 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        No Known Allergies    Current medication reviewed for route, type, dose and frequency and are current per MAR at time of dictation.    Palliative Performance Scale Score: 20%    /74 (BP Location: Left arm, Patient Position: Sitting)   Pulse 112   Temp 96.3 °F (35.7 °C) (Axillary)   Resp 16   Ht 170.2 cm (67\")   Wt 92 kg (202 lb 13.2 oz)   SpO2 95%   BMI 31.77 kg/m²     Intake/Output Summary (Last 24 hours) at 5/19/2020 1722  Last data filed at 5/19/2020 1219  Gross per 24 hour   Intake 550 ml   Output --   Net 550 ml       Physical Exam:      General Appearance:    Alert, oriented to self only, grunting, appears agitated, shaking head   HEENT:    NC/AT, EOMI, anicteric, MMM   Neck:   supple, trachea midline, no JVD   Lungs:     CTA bilat,respirations labored     Heart:    Tachycardia, no murmur    Abdomen:     Normal bowel sounds, non-tender, rounded abdomen, obese    Extremities:   Moves all extremities, +2 edema bilateral feet, ankle, extended to calf cool to touch, pink   Pulses:   Pulses palpable and equal bilaterally   Skin:   Pink, dry, multiple tattoos    Neurologic:   Confused, grunting, shaking head   Psych:   Appears agitated          Labs:   Results from last 7 days   Lab Units 05/19/20  0916   WBC 10*3/mm3 12.24*   HEMOGLOBIN g/dL " 12.1*   HEMATOCRIT % 39.0   PLATELETS 10*3/mm3 234     Results from last 7 days   Lab Units 05/19/20  0915   SODIUM mmol/L 145   POTASSIUM mmol/L 5.5*   CHLORIDE mmol/L 102   CO2 mmol/L 34.0*   BUN mg/dL 28*   CREATININE mg/dL 1.17   GLUCOSE mg/dL 118*   CALCIUM mg/dL 9.3     Results from last 7 days   Lab Units 05/19/20  0915   SODIUM mmol/L 145   POTASSIUM mmol/L 5.5*   CHLORIDE mmol/L 102   CO2 mmol/L 34.0*   BUN mg/dL 28*   CREATININE mg/dL 1.17   CALCIUM mg/dL 9.3   BILIRUBIN mg/dL 0.2   ALK PHOS U/L 91   ALT (SGPT) U/L 20   AST (SGOT) U/L 18   GLUCOSE mg/dL 118*     Imaging Results (Last 72 Hours)     Procedure Component Value Units Date/Time    CT Head Without Contrast [[id number]] Collected:  05/19/20 0933     Updated:  05/19/20 1634    Narrative:       EXAMINATION: CT HEAD WO CONTRAST- 05/19/2020     INDICATION: AMS mental status change     TECHNIQUE: Multiple axial CT imaging was obtained of the head from skull  base to skull vertex without the administration of intravenous contrast.     The radiation dose reduction device was turned on for each scan per the  ALARA (As Low as Reasonably Achievable) protocol.     COMPARISON: NONE     FINDINGS: Old lacunar infarct identified of the right basal ganglia.  There is motion artifact degrading image quality. There is no evidence  of hemorrhage or hydrocephalus. No mass, mass effect, or midline shift.  No abnormal extra-axial fluid collections identified. The bony  structures reveal no evidence of osseous abnormality. The visualized  paranasal sinuses are clear. Mastoid air cells are patent.       Impression:       Old lacunar infarct identified in the right basal ganglia.  No acute intracranial abnormality is identified.     Examination was performed 05/09/2020 at 9:07 AM and examination results  were given to the emergency room physician at time of performance of the  examination at the scanner.     D:  05/19/2020  E:  05/19/2020       XR Chest 1 View [[id number]]  Collected:  05/19/20 1010     Updated:  05/19/20 1011    Narrative:          EXAMINATION: XR CHEST 1 VW-      INDICATION: ignacio fever weakness, shortness of breath     COMPARISON: 04/05/2020     FINDINGS: Portable chest reveals Pleurx catheter identified on the left.  There is development of a moderate to large right pleural effusion.  There is airspace disease seen diffusely throughout the lung fields  bilaterally representing patient's known lymphangitic spread of  malignancy. Degenerative changes seen within the spine.           Impression:       Development of a moderate to large right pleural effusion.  Pleurx catheter on the left with small pleural effusion present.  Increased markings seen diffusely throughout the lung fields bilaterally  slightly worsened in the interval.          CT Abdomen Pelvis Without Contrast [[id number]] Collected:  05/19/20 0938     Updated:  05/19/20 0941    Narrative:       EXAMINATION: CT ABDOMEN PELVIS WO CONTRAST-      INDICATION: TENDERNESS abdominal tenderness, history of renal cell  malignancy     TECHNIQUE: Multiple axial CT imaging is obtained of the abdomen and  pelvis without the ministration of intravenous contrast.     The radiation dose reduction device was turned on for each scan per the  ALARA (As Low as Reasonably Achievable) protocol.     COMPARISON: 03/04/2020     FINDINGS: Large pleural effusion is identified on the right with small  pleural effusion on the left. Pleurx catheter is identified left with  extensive nodular densities suggesting lymphangitic spread of malignancy  diffusely throughout the visualized lung fields. Bony structures reveal  degenerative changes seen within the spine. Liver is heterogeneous with  multiple lesions identified throughout suggesting hepatic metastatic  disease. There is adenopathy seen throughout the retroperitoneum.  Abnormal appearance of the adrenal gland on the left. Low-density mass  seen in the fused enlargement of the left  kidney with wall thickening  identified of the throat is fashion. There is no abnormality seen within  the pancreas or spleen. Adenopathy throughout the retroperitoneum. Bony  structures reveal degenerative changes seen within the spine and pelvis.  The abdominal portion gastrointestinal tract within normal limits. No  free fluid or free air. Small bilateral inguinal hernias.     Pelvis: Pelvic organs are unremarkable. The pelvic portion the  gastrointestinal tract within normal limits. No free fluid or free air.  No abnormal mass or fluid collections identified.             Impression:       Bilateral pleural effusions with Pleurx catheter identified  on the right. Lymphangitic spread of disease diffusely throughout the  July the lung fields. There is massive abnormality seen within the left  kidney with diffuse enlargement, thickening enteritis/as well as  abnormality and enlargement of the adrenal gland. Adenopathy seen in the  retroperitoneum with diffuse diffuse hepatic metastatic disease.                Diagnostics: Reviewed    A:   Patient Active Problem List   Diagnosis   • Radiculopathy   • Hypertension   • Lumbar spine tumor   • Intractable back pain   • Renal cancer, left (CMS/HCC)   • Sinus tachycardia   • Dyspnea on exertion   • Cancer related pain   • Lumbar stenosis with neurogenic claudication   • Fever   • Bone metastases (CMS/HCC)   • Cough, persistent   • Hypoxia   • Metastatic cancer (CMS/HCC)   • Elevated serum creatinine   • Malignant pleural effusion   • Collecting duct carcinoma (CMS/HCC)   • Acute respiratory failure with hypoxia and hypercapnia (CMS/HCC)   • Acute hypoxemic respiratory failure (CMS/HCC)   • CKD (chronic kidney disease) stage 3, GFR 30-59 ml/min (CMS/HCC)   • Bilateral leg edema   • Orthopnea   • Acute respiratory failure with hypercapnia (CMS/HCC)   • Bilateral pulmonary embolism (CMS/HCC)         S/S:   -Dyspnea  -Altered mental status  -Anxiety/agitation  -Malignant  pain  -Edema bilateral feet, ankle, calf          P:    53yo male with metastatic renal cell carcinoma presents with dyspnea, altered mental status, and increased pain.    1) Dyspnea  -Continue bipap as tolerated  -Schedule duonebs Q 4 hours while awake  -Continue dilaudid 0.5mg IV PRN    2) Anxiety/agitatioin  -initiate lorazepam 0.5mg prn    3) Malignant pain  -continue dilaudid 0.5 mg prn    4) Goals of care  Spoke with sister Felecia who is also health care surrogate. She reports that Alphonso was recently enrolled in clinical trial at .When she called to report symptoms to 's team, she was advised that the medication he is receiving could cause a cytokine storm and lead to ARDS. They recommend high dose steroids, which Felecia shared with primary team. She states that he was awake and alert yesterday and told her that he still wanted to live and if this clinical trial did not work he wanted to keep going to the next one. She does confirm that she wants him to remain DNR/DNI status, however would like to give him a chance on bipap to see if he shows any improvements. She shared that if he shows continued decline, she will then at that point consider Hospice care.         Thank you for this consult and allowing us to participate in patient's plan of care. Palliative Care Team will continue to follow patient.     Time spent: 45minutes spent reviewing medical and medication records, assessing and examining patient, discussing with family, answering questions, providing some guidance about a plan and documentation of care, and coordinating care with other healthcare members, with > 50% time spent face to face.     Matt Duran, APRN  5/19/2020      EMR Dragon/Transcription disclaimer:  Much of this encounter note is an electronic transcription/translation of spoken language to printed text. Electronic translation of spoken language may permit erroneous, or at times, nonsensical words or phrases to be  inadvertently transcribed. Although I have reviewed the note for such errors, some may still exist.

## 2020-05-20 PROBLEM — C64.9 RENAL CANCER (HCC): Status: ACTIVE | Noted: 2020-01-01

## 2020-05-20 NOTE — DISCHARGE SUMMARY
[facility] Medicine Services  DISCHARGE TO INPATIENT HOSPICE    Patient Name: Alphonso Evans  : 1965  MRN: [id number]    Date of Admission: 2020  Date of Discharge:  2020  Primary Care Physician: Juan Mccarty MD    Consults     Date and Time Order Name Status Description    2020 1240 Inpatient Palliative Care MD Consult Completed         Hospital Course     Presenting Problem:   Renal cancer (CMS/HCC) [C64.9]  Renal cancer (CMS/HCC) [C64.9]  Renal cancer (CMS/HCC) [C64.9]    Active Hospital Problems    Diagnosis  POA   • Renal cancer (CMS/HCC) [C64.9]  Yes      Resolved Hospital Problems   No resolved problems to display.          Hospital Course:  Alphonso Evans is a 54 y.o. male with a history of a metastatic renal cell carcinoma initially diagnosed in 2019.  Known metastatic disease to the bones, liver, lymphangitic spread in the lungs.  Disease progression despite treatment with Dr. Coffey.  Admitted in April and had a Pleurx catheter placed on the left side for recurrent malignant pleural effusion.  After that has been evaluated at [facility] and has been enrolled in a clinical trial.  Staging scans at that time incidentally found bilateral pulmonary emboli and was started on Xarelto.  Over the last week breathing has become a little worse.  His sister has been draining his Pleurx catheter more often to the point where she is draining a liter off a day.  Within the last 24 hours condition is gotten worse where he has become more confused, appears to have more pain, and unable to walk.  Decision was made to bring him in this morning.  ABG shows significant mixed hypoxic and hypercapnic respiratory failure and he has been placed on BiPAP and is may be slightly more arousable since that time.  Chest x-ray reveals worsening right-sided pleural effusion.  No known fevers, no leukocytosis.  Tested negative for COVID in April.  He  received doses of vancomycin and Zosyn in the emergency room.  Pt was admitted to the hospitalist service with Palliative Medicine consult. Pt was placed on Bi-pap. He did not tolerate well and has since been on a non-rebreather. Pt has received several doses of Dialudid and still remains agitated and restless. Pt has also been on high dose steroids due to concern for possible cytokine storm from current chemotherapy. He has been participating in a clinical trial at . Pt also with large R pleural effusion. Discussed with sister Felecia at bedside. Hopsice consulted and patient will be transitioned to inpatient hospice.     Day of Discharge     HPI:   Pt seen and examined. Nursing notes reviewed. Patient's sister at bedside. Pt wearing non-rebreather. He is actively dying. He seems agitated and groans at times.     ROS:  Unable to obtain    Vital Signs:   Temp:  [96.3 °F (35.7 °C)-97.8 °F (36.6 °C)] 97.5 °F (36.4 °C)  Heart Rate:  [108-134] 126  Resp:  [16-28] 20  BP: ()/(65-78) 117/78     Physical Exam:  Constitutional: Lying in bed, agitated  HENT: NCAT, mucous membranes dry  Respiratory: Coarse breath sounds throughout, tachypnea   Cardiovascular: Tachycardic, no murmurs, rubs, or gallops, palpable pedal pulses bilaterally  Gastrointestinal: Positive bowel sounds, soft, nontender, nondistended  Musculoskeletal: No bilateral ankle edema  Psychiatric: Unable to evalaute  Neurologic: Grossly non-focal  Skin: No rashes    Discharge Details     Discharge Disposition:  Transfer care to inpatient Hospice at [facility]    Time Spent on Discharge:  50 minutes, 35 minutes spent face to face with patient and sister discussing case/plan of care. Remaining time spent coordinating care.      Electronically signed by Sayda Timmons DO, 05/20/20, 2:55 PM.

## 2020-05-20 NOTE — DISCHARGE PLACEMENT REQUEST
"Farnaz Evans (54 y.o. Male)     Date of Birth Social Security Number Address Home Phone MRN    1965  [id number] [id number] [phone] [id number]    Islam Marital Status          None        Admission Date Admission Type Admitting Provider Attending Provider Department, Room/Bed    5/19/20 Emergency Sayda Timmons DO [place], Olivia D, DO [facility] 4H, S480/1    Discharge Date Discharge Disposition Discharge Destination                       Attending Provider:  Sayda Timmons DO    Allergies:  No Known Allergies    Isolation:  None   Infection:  None   Code Status:  No CPR    Ht:  170.2 cm (67\")   Wt:  92 kg (202 lb 13.2 oz)    Admission Cmt:  None   Principal Problem:  Acute respiratory failure with hypoxia and hypercapnia (CMS/HCC) [J96.01,J96.02]                 Active Insurance as of 5/19/2020     Primary Coverage     Payor Plan Insurance Group Employer/Plan Group    ANTHEM MEDICAID ANTHEM MEDICAID [id number]     Payor Plan Address Payor Plan Phone Number Payor Plan Fax Number Effective Dates    [address] [address] [phone]  12/1/2019 - None Entered    [facility] [id number]       Subscriber Name Subscriber Birth Date Member ID       FARNAZ EVANS 1965 [id number]                 Emergency Contacts      (Rel.) Home Phone Work Phone Mobile Phone    INDIO SINGH (Sister) [phone] -- [phone]            Emergency Contact Information     Name Relation Home Work Mobile    INDIO SINGH Sister [phone]          Insurance Information                ANTHEM MEDICAID/ANTHEM MEDICAID Phone: [phone]    Subscriber: Farnaz Evans Subscriber#: [id number]    Group#: [id number] Precert#:              History & Physical      Jose Vu MD at 05/19/20 1240              [facility]  HISTORY AND PHYSICAL    Patient Name: Farnaz" Andi Evans  : 1965  MRN: [id number]  Primary Care Physician: Juan Mccarty MD  Date of admission: 2020      Subjective   Subjective     Chief Complaint:  Mental status changes    HPI:  Alphonso Evans is a 54 y.o. male with a history of a metastatic renal cell carcinoma initially diagnosed in 2019.  Known metastatic disease to the bones, liver, lymphangitic spread in the lungs.  Disease progression despite treatment with Dr. Coffey.  Admitted in April and had a Pleurx catheter placed on the left side for recurrent malignant pleural effusion.  After that has been evaluated at [facility] and has been enrolled in a clinical trial.  Staging scans at that time incidentally found bilateral pulmonary emboli and was started on Xarelto.  Over the last week breathing has become a little worse.  His sister has been draining his Pleurx catheter more often to the point where she is draining a liter off a day.  Within the last 24 hours condition is gotten worse where he has become more confused, appears to have more pain, and unable to walk.  Decision was made to bring him in this morning.  ABG shows significant mixed hypoxic and hypercapnic respiratory failure and he has been placed on BiPAP and is may be slightly more arousable since that time.  Chest x-ray reveals worsening right-sided pleural effusion.  No known fevers, no leukocytosis.  Tested negative for COVID in April.  He received doses of vancomycin and Zosyn in the emergency room.  He is being admitted for further evaluation.    Review of Systems   Unable to obtain secondary to current mental status    Personal History     Past Medical History:   Diagnosis Date   • Cancer (CMS/HCC)     Renal, Spine, Brain   • Hypertension        Past Surgical History:   Procedure Laterality Date   • CYBERKNIFE Right 2020    Right paraspinal lesion behind the L2 vertebral body- Dr. Michael Rizo    • KNEE SURGERY Left     x 3   •  ROTATOR CUFF REPAIR Right 2014       Family History: family history includes Cancer in his father; Heart disease in his father. Otherwise pertinent FHx was reviewed and unremarkable.     Social History:  reports that he has never smoked. He has never used smokeless tobacco. He reports that he does not drink alcohol or use drugs.  Social History     Social History Narrative    Lives in [place]. 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        Medications:  Available home medication information reviewed.    No Known Allergies    Objective   Objective     Vital Signs:   Temp:  [98.4 °F (36.9 °C)] 98.4 °F (36.9 °C)  Heart Rate:  [] 103  Resp:  [14-25] 16  BP: (112-133)/(66-94) 127/91        Physical Exam   Constitutional: arouses to loud verbal stim, ill appearing  Eyes: PERRLA, sclerae anicteric, no conjunctival injection  HENT: NCAT, mucous membranes moist  Neck: Supple, no thyromegaly, no lymphadenopathy, trachea midline  Respiratory: Diminished breath sounds bilaterally, is on BiPAP  Cardiovascular: Tachycardic, no murmurs, rubs, or gallops  Gastrointestinal: Positive bowel sounds, soft, nontender, nondistended  Musculoskeletal: Legs are wrapped bilaterally, 3+ pitting edema  Psychiatric: Unable to assess  Neurologic: Intermittently follows commands, no obvious focal deficits  Skin: No rashes      Results Reviewed:  I have personally reviewed current lab and radiology data.    Results from last 7 days   Lab Units 05/19/20  0916  05/19/20  0909   WBC 10*3/mm3 12.24*  --   --    HEMOGLOBIN g/dL 12.1*  --   --    HEMOGLOBIN, POC   --    < >  --    HEMATOCRIT % 39.0  --   --    HEMATOCRIT POC   --    < >  --    PLATELETS 10*3/mm3 234  --   --    INR   --   --  1.1    < > = values in this interval not displayed.     Results from last 7 days   Lab Units 05/19/20  0916 05/19/20  0915   SODIUM mmol/L  --  145   POTASSIUM mmol/L  --  5.5*   CHLORIDE mmol/L  --  102   CO2 mmol/L  --  34.0*   BUN  mg/dL  --  28*   CREATININE mg/dL  --  1.17   GLUCOSE mg/dL  --  118*   CALCIUM mg/dL  --  9.3   ALT (SGPT) U/L  --  20   AST (SGOT) U/L  --  18   TROPONIN T ng/mL  --  <0.010   LACTATE mmol/L 2.5*  --    PROCALCITONIN ng/mL  --  0.28*     Estimated Creatinine Clearance: 77.2 mL/min (by C-G formula based on SCr of 1.17 mg/dL).  Brief Urine Lab Results  (Last result in the past 365 days)      Color   Clarity   Blood   Leuk Est   Nitrite   Protein   CREAT   Urine HCG        04/05/20 1527 Yellow Clear Negative Negative Negative 30 mg/dL (1+)             Imaging Results (Last 24 Hours)     Procedure Component Value Units Date/Time    XR Chest 1 View [[id number]] Collected:  05/19/20 1010     Updated:  05/19/20 1011    Narrative:          EXAMINATION: XR CHEST 1 VW-      INDICATION: ignacio fever weakness, shortness of breath     COMPARISON: 04/05/2020     FINDINGS: Portable chest reveals Pleurx catheter identified on the left.  There is development of a moderate to large right pleural effusion.  There is airspace disease seen diffusely throughout the lung fields  bilaterally representing patient's known lymphangitic spread of  malignancy. Degenerative changes seen within the spine.           Impression:       Development of a moderate to large right pleural effusion.  Pleurx catheter on the left with small pleural effusion present.  Increased markings seen diffusely throughout the lung fields bilaterally  slightly worsened in the interval.          CT Abdomen Pelvis Without Contrast [[id number]] Collected:  05/19/20 0938     Updated:  05/19/20 0941    Narrative:       EXAMINATION: CT ABDOMEN PELVIS WO CONTRAST-      INDICATION: TENDERNESS abdominal tenderness, history of renal cell  malignancy     TECHNIQUE: Multiple axial CT imaging is obtained of the abdomen and  pelvis without the ministration of intravenous contrast.     The radiation dose reduction device was turned on for each scan per the  ALARA (As Low as Reasonably  Achievable) protocol.     COMPARISON: 03/04/2020     FINDINGS: Large pleural effusion is identified on the right with small  pleural effusion on the left. Pleurx catheter is identified left with  extensive nodular densities suggesting lymphangitic spread of malignancy  diffusely throughout the visualized lung fields. Bony structures reveal  degenerative changes seen within the spine. Liver is heterogeneous with  multiple lesions identified throughout suggesting hepatic metastatic  disease. There is adenopathy seen throughout the retroperitoneum.  Abnormal appearance of the adrenal gland on the left. Low-density mass  seen in the fused enlargement of the left kidney with wall thickening  identified of the throat is fashion. There is no abnormality seen within  the pancreas or spleen. Adenopathy throughout the retroperitoneum. Bony  structures reveal degenerative changes seen within the spine and pelvis.  The abdominal portion gastrointestinal tract within normal limits. No  free fluid or free air. Small bilateral inguinal hernias.     Pelvis: Pelvic organs are unremarkable. The pelvic portion the  gastrointestinal tract within normal limits. No free fluid or free air.  No abnormal mass or fluid collections identified.             Impression:       Bilateral pleural effusions with Pleurx catheter identified  on the right. Lymphangitic spread of disease diffusely throughout the  July the lung fields. There is massive abnormality seen within the left  kidney with diffuse enlargement, thickening enteritis/as well as  abnormality and enlargement of the adrenal gland. Adenopathy seen in the  retroperitoneum with diffuse diffuse hepatic metastatic disease.          CT Head Without Contrast [[id number]] Collected:  05/19/20 0933     Updated:  05/19/20 0941    Narrative:       EXAMINATION: CT HEAD WO CONTRAST-      INDICATION: AMS mental status change     TECHNIQUE: Multiple axial CT imaging is obtained of the head from  skull  base to skull vertex without the ministration of intravenous contrast.     The radiation dose reduction device was turned on for each scan per the  ALARA (As Low as Reasonably Achievable) protocol.     COMPARISON: NONE     FINDINGS: Old lacunar infarct identified of the right basal ganglia.  There is motion artifact degrading image quality. There is no evidence  of hemorrhage or hydrocephalus. No mass, mass effect, midline shift. No  abnormal extra-axial fluid collections identified. The bony structures  reveal no evidence of osseous abnormality. The visualized paranasal  sinuses are clear. Mastoid air cells are patent.             Impression:       Old lacunar infarct identified in the right basal ganglia.  No acute intracranial abnormality is identified.     Examination was performed 05/09/2020 at 9 0 7:00 AM in examination  results were given to the emergency room physician at time of  performance of the examination at the scanner.              Results for orders placed during the hospital encounter of 01/29/20   Adult Transthoracic Echo Complete W/ Cont if Necessary Per Protocol    Narrative · Estimated EF = 50-55%.  · Left ventricular systolic function is low normal.  · Mild tricuspid valve regurgitation is present.  · Calculated right ventricular systolic pressure from tricuspid   regurgitation is 27 mmHg.  · The global longitudinal strain is calculated at -17 which is slightly   less than normal (normal is -20)          Assessment/Plan   Assessment & Plan     Active Hospital Problems    Diagnosis POA   • **Acute respiratory failure with hypoxia and hypercapnia (CMS/HCC) [J96.01, J96.02] Yes     Priority: High   • Acute respiratory failure with hypercapnia (CMS/HCC) [J96.02] Yes   • Bilateral pulmonary embolism (CMS/HCC) [I26.99] Unknown   • CKD (chronic kidney disease) stage 3, GFR 30-59 ml/min (CMS/HCC) [N18.3] Yes   • Malignant pleural effusion [J91.0] Yes   • Bone metastases (CMS/HCC) [C79.51] Yes    • Renal cancer, left (CMS/HCC) [C64.2] Yes   • Hypertension [I10] Yes       Mr. Evans is an unfortunate 54-year-old male with widely metastatic renal cell carcinoma.  Previously progressed despite treatment with Dr. Coffey and within the last few weeks has been enrolled in clinical trials at [facility].  Presents with altered mental status, decreased respiratory status, and increasing pain.  ABG shows significant hypoxia and hypercarbia.  I personally reviewed chest x-ray which shows a minimal effusion on the left with Pleurx catheter in place and increasing right sided effusion.  Prior CT scan showed probable lymphangitic spread.  I have low suspicion of pneumonia with no fever or leukocytosis.  I do not think further antibiotics will be of any utility.  Also recently placed on Xarelto for incidentally found bilateral pulmonary emboli.  That will be continued.  Long discussion with his sister who is Felecia Sandoval LENNY regarding goals of care.  He has a DO NOT RESUSCITATE for right now but has wanting to continue to pursue clinical trials.  She understands that he is likely dying and future care is likely futile.  Plan for now is going to be continue on BiPAP and see if he will mental status will improve so he can contribute to the conversation.  Discussed possibility of draining pleural fluid on the right side however will hold off on that for now in order to avoid invasive procedures.  We will consult palliative care to assist with goals of care.  If no significant improvement on BiPAP will likely get hospice consult and he would be appropriate for inpatient hospice.    DVT prophylaxis: Xarelto      CODE STATUS:    Code Status and Medical Interventions:   Ordered at: 05/19/20 1238     Limited Support to NOT Include:    Cardioversion/Defibrillation    Intubation     Level Of Support Discussed With:    Next of Kin (If No Surrogate)    Health Care Surrogate     Code Status:    No CPR     Medical  Interventions (Level of Support Prior to Arrest):    Limited       Admission Status:  I believe this patient meets inpatient criteria due to severity of illness and need for BiPAP support.  Will be in the hospital least 2 midnights.    Electronically signed by Jose Vu MD, 05/19/20, 12:40 PM.        Electronically signed by Jose Vu MD at 05/19/20 7163

## 2020-05-20 NOTE — PLAN OF CARE
Problem: Patient Care Overview  Goal: Interprofessional Rounds/Family Conf  Flowsheets (Taken 5/20/2020 1200)  Participants: advanced practice nurse; nursing  Note:   Patient is actively dying,he is unresponsive, terminal restless, mouth dry, 100% rebreathe in place. Sister, CHRISTINE, Felecia at bedside, she has requested in-patient Hospice. [facility] supervisor Maryam notified of patient dying and asking for visitors at bedside. She gave permission for Felecia and 16 year old to be at bedside. Felecia informed that she could stay the night. Hospice Nurse and Jodi coming to evaluate the patient. Patient discharged and readmitted to Hospice.

## 2020-05-20 NOTE — H&P
Juan Mccarty MD - PCP    HPI:   55 yo M with renal cell CA metastatic to bone, liver, lymphangetic spread. DX'ed 12/2019. Disease progression despite extensive treatment, including current participation in a clinical trial at . PleurX drain placed for recurrent left malignant pleural effusion.  Recent increased drainage of 1L/day.    Bilateral pulmonary emboli on Xarelto.     Acutely hospitalized with hypoxic respiratory failure.  Didn't tolerate BiPAP.  High flow O2 and high dose steroids for symptom mgmt.  Increased agitation and increased work of breathing at rest.     PPS 10% with s/s of imminent death.  Given terminal prognosis with current grave clinical status, sister Felecia and daughter Kristen at bedside, agreeable to transition to full comfort focused care in hospital setting.    Meds reviewed.  PRN dilaudid given x4 today = 2mg in 13 hrs (and 3mg in 23 hrs)  PRN ativan x3    Past Medical History:   Diagnosis Date   • Cancer (CMS/HCC)     Renal, Spine, Brain   • Hypertension      Past Surgical History:   Procedure Laterality Date   • CYBERKNIFE Right 01/09/2020    Right paraspinal lesion behind the L2 vertebral body- Dr. Michael Rizo    • KNEE SURGERY Left     x 3   • ROTATOR CUFF REPAIR Right 2014     Current Code Status     Date Active Code Status Order ID Comments User Context       5/20/2020 1316 No CPR [id number]  Safia Venegas MD Inpatient       Questions for Current Code Status     Question Answer Comment    Code Status No CPR     Medical Interventions (Level of Support Prior to Arrest) Comfort Measures         Current Facility-Administered Medications   Medication Dose Route Frequency Provider Last Rate Last Dose   • acetaminophen (TYLENOL) suppository 650 mg  650 mg Rectal Q4H PRN Safia Venegas MD       • bisacodyl (DULCOLAX) suppository 10 mg  10 mg Rectal Daily PRN Safia Venegas MD       • furosemide (LASIX) injection 20 mg  20 mg Intravenous Q6H PRN Safia Venegas MD        • glycopyrrolate (ROBINUL) injection 0.4 mg  0.4 mg Intravenous Q4H PRN Safia Venegas MD       • haloperidol lactate (HALDOL) injection 2 mg  2 mg Intravenous Q4H PRN Safia Venegas MD       • HYDROmorphone (DILAUDID) injection 0.5 mg  0.5 mg Intravenous Q30 Min PRN Safia Venegas MD   0.5 mg at 05/20/20 1821    Or   • HYDROmorphone (DILAUDID) injection 1 mg  1 mg Intravenous Q30 Min PRN Safia Venegas MD       • HYDROmorphone (DILAUDID) PCA 1 mg/mL syringe   Intravenous Continuous Safia Venegas MD       • ketorolac (TORADOL) injection 15 mg  15 mg Intravenous Q6H PRN Safia Venegas MD       • LORazepam (ATIVAN) injection 0.5 mg  0.5 mg Intravenous Q6H Safia Venegas MD   0.5 mg at 05/20/20 1526   • LORazepam (ATIVAN) injection 0.5 mg  0.5 mg Intravenous Q2H PRN Safia Venegas MD        Or   • LORazepam (ATIVAN) injection 1 mg  1 mg Intravenous Q2H PRN Safia Venegas MD       • [START ON 5/21/2020] methylPREDNISolone sodium succinate (SOLU-Medrol) injection 60 mg  60 mg Intravenous Daily Safia Venegas MD       • ondansetron (ZOFRAN) injection 4 mg  4 mg Intravenous Q6H PRN Safia Venegas MD       • palliative care oral rinse   Mouth/Throat TID Safia Venegas MD       • palliative care oral rinse   Mouth/Throat PRN Safia Venegas MD       • sodium chloride 0.9 % flush 10 mL  10 mL Intravenous PRN Safia Venegas MD           HYDROmorphone HCl-NaCl      No Known Allergies  Family History   Problem Relation Age of Onset   • Cancer Father    • Heart disease Father      Social History     Socioeconomic History   • Marital status:      Spouse name: Not on file   • Number of children: Not on file   • Years of education: Not on file   • Highest education level: Not on file   Tobacco Use   • Smoking status: Never Smoker   • Smokeless tobacco: Never Used   Substance and Sexual Activity   • Alcohol use: Never     Frequency: Never   • Drug use: Never   • Sexual activity:  Defer   Social History Narrative    Lives in [place]. 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    Sister Felecia Sandoval, medical POA  Dtr [facility]    Review of Systems - as per HPI and PMH      Resp 20   SpO2 97%     Intake/Output Summary (Last 24 hours) at 5/20/2020 1913  Last data filed at 5/20/2020 0900  Gross per 24 hour   Intake 0 ml   Output 300 ml   Net -300 ml     Physical Exam:              Gen: muscular, well nourished adult male, in bed, no current acute                       distress              Head/Neck: NC/AT, trachea midline              Eyes: closed, no lid edema              ENT: mouth open and dry, oxygen mask in place              CV: regular, tachy, no murmur              Pulm: less labored, agonal breathing, +crackles, diminished bases              GI: abd soft, NT/ND              Extrem:  3+ edema, legs wrapped              Neuro: not awake or alert, unresponsive to voice and exam, no tremor              Psych: face relaxed, no grimacing or groaning    Results from last 7 days   Lab Units 05/19/20  0916   WBC 10*3/mm3 12.24*   HEMOGLOBIN g/dL 12.1*   HEMATOCRIT % 39.0   PLATELETS 10*3/mm3 234     Results from last 7 days   Lab Units 05/19/20  0915   SODIUM mmol/L 145   POTASSIUM mmol/L 5.5*   CHLORIDE mmol/L 102   CO2 mmol/L 34.0*   BUN mg/dL 28*   CREATININE mg/dL 1.17   CALCIUM mg/dL 9.3   BILIRUBIN mg/dL 0.2   ALK PHOS U/L 91   ALT (SGPT) U/L 20   AST (SGOT) U/L 18   GLUCOSE mg/dL 118*     Results from last 7 days   Lab Units 05/19/20  0915   SODIUM mmol/L 145   POTASSIUM mmol/L 5.5*   CHLORIDE mmol/L 102   CO2 mmol/L 34.0*   BUN mg/dL 28*   CREATININE mg/dL 1.17   GLUCOSE mg/dL 118*   CALCIUM mg/dL 9.3     XR Chest 1 View [[id number]] Collected:  05/19/20 1010        Updated:  05/19/20 1011     Narrative:           EXAMINATION: XR CHEST 1 VW-      INDICATION: ignacio fever weakness, shortness of breath     COMPARISON: 04/05/2020     FINDINGS: Portable chest reveals  Pleurx catheter identified on the left.  There is development of a moderate to large right pleural effusion.  There is airspace disease seen diffusely throughout the lung fields  bilaterally representing patient's known lymphangitic spread of  malignancy. Degenerative changes seen within the spine.            Impression:        Development of a moderate to large right pleural effusion.  Pleurx catheter on the left with small pleural effusion present.  Increased markings seen diffusely throughout the lung fields bilaterally  slightly worsened in the interval.           CT Abdomen Pelvis Without Contrast [[id number]] Collected:  05/19/20 0938       Updated:  05/19/20 0941     Narrative:        EXAMINATION: CT ABDOMEN PELVIS WO CONTRAST-      INDICATION: TENDERNESS abdominal tenderness, history of renal cell  malignancy     TECHNIQUE: Multiple axial CT imaging is obtained of the abdomen and  pelvis without the ministration of intravenous contrast.     The radiation dose reduction device was turned on for each scan per the  ALARA (As Low as Reasonably Achievable) protocol.     COMPARISON: 03/04/2020     FINDINGS: Large pleural effusion is identified on the right with small  pleural effusion on the left. Pleurx catheter is identified left with  extensive nodular densities suggesting lymphangitic spread of malignancy  diffusely throughout the visualized lung fields. Bony structures reveal  degenerative changes seen within the spine. Liver is heterogeneous with  multiple lesions identified throughout suggesting hepatic metastatic  disease. There is adenopathy seen throughout the retroperitoneum.  Abnormal appearance of the adrenal gland on the left. Low-density mass  seen in the fused enlargement of the left kidney with wall thickening  identified of the throat is fashion. There is no abnormality seen within  the pancreas or spleen. Adenopathy throughout the retroperitoneum. Bony  structures reveal degenerative changes seen  within the spine and pelvis.  The abdominal portion gastrointestinal tract within normal limits. No  free fluid or free air. Small bilateral inguinal hernias.     Pelvis: Pelvic organs are unremarkable. The pelvic portion the  gastrointestinal tract within normal limits. No free fluid or free air.  No abnormal mass or fluid collections identified.              Impression:        Bilateral pleural effusions with Pleurx catheter identified  on the right. Lymphangitic spread of disease diffusely throughout the  July the lung fields. There is massive abnormality seen within the left  kidney with diffuse enlargement, thickening enteritis/as well as  abnormality and enlargement of the adrenal gland. Adenopathy seen in the  retroperitoneum with diffuse diffuse hepatic metastatic disease.           CT Head Without Contrast [[id number]] Collected:  05/19/20 0933       Updated:  05/19/20 0941     Narrative:        EXAMINATION: CT HEAD WO CONTRAST-      INDICATION: AMS mental status change     TECHNIQUE: Multiple axial CT imaging is obtained of the head from skull  base to skull vertex without the ministration of intravenous contrast.     The radiation dose reduction device was turned on for each scan per the  ALARA (As Low as Reasonably Achievable) protocol.     COMPARISON: NONE     FINDINGS: Old lacunar infarct identified of the right basal ganglia.  There is motion artifact degrading image quality. There is no evidence  of hemorrhage or hydrocephalus. No mass, mass effect, midline shift. No  abnormal extra-axial fluid collections identified. The bony structures  reveal no evidence of osseous abnormality. The visualized paranasal  sinuses are clear. Mastoid air cells are patent.              Impression:        Old lacunar infarct identified in the right basal ganglia.  No acute intracranial abnormality is identified.     Examination was performed 05/09/2020 at 9 0 7:00 AM in examination  results were given to the emergency  room physician at time of  performance of the examination at the scanner.        Results for orders placed during the hospital encounter of 01/29/20   Adult Transthoracic Echo Complete W/ Cont if Necessary Per Protocol     Narrative · Estimated EF = 50-55%.  · Left ventricular systolic function is low normal.  · Mild tricuspid valve regurgitation is present.  · Calculated right ventricular systolic pressure from tricuspid   regurgitation is 27 mmHg.  · The global longitudinal strain is calculated at -17 which is slightly   less than normal (normal is -20)          Active Hospital Problems     Diagnosis POA   • **Acute respiratory failure with hypoxia and hypercapnia (CMS/HCC) [J96.01, J96.02] Yes   • Acute respiratory failure with hypercapnia (CMS/HCC) [J96.02] Yes   • Bilateral pulmonary embolism (CMS/HCC) [I26.99] Unknown   • CKD (chronic kidney disease) stage 3, GFR 30-59 ml/min (CMS/HCC) [N18.3] Yes   • Malignant pleural effusion [J91.0] Yes   • Bone metastases (CMS/HCC) [C79.51] Yes   • Renal cancer, left (CMS/HCC) [C64.2] Yes   • Hypertension [I10]        Impression:   53yo with widespread metastatic renal carcinoma with acute decline.    Symptoms:   Dyspnea  Presumed neoplastic pain  Restlessness    Plan:   -Admitted to inpt hospice care for comfort focused end of life care.  -Scheduled opiate and ativan w/ generous PRNs available.  -PRNs also available in case of nausea, fever, constipation, and terminal secretions.  -Family agreeable to hospice plan of care and educated about Hospice  Interdisciplinary team availability.  -Prognosis of hours to days.    Safia Venegas MD  05/20/20  19:13

## 2020-05-20 NOTE — PAYOR COMM NOTE
"Velia Olguin RN  Utilization Review  P: [phone]  F: [phone]    Member ID = [id number]  DC date = 5/20/2020 (transitioned to inpatient Hospice)    Farnaz Evans (54 y.o. Male)     Date of Birth Social Security Number Address Home Phone MRN    1965  [id number] [id number] [phone] [id number]    Scientologist Marital Status          None        Admission Date Admission Type Admitting Provider Attending Provider Department, Room/Bed    5/19/20 Emergency Sayda Timmons,   [facility]H, S480/1    Discharge Date Discharge Disposition Discharge Destination        5/20/2020 Hospice/Medical Facility ([facility])              Attending Provider:  (none)   Allergies:  No Known Allergies    Isolation:  None   Infection:  None   Code Status:  No CPR    Ht:  170.2 cm (67\")   Wt:  92 kg (202 lb 13.2 oz)    Admission Cmt:  None   Principal Problem:  Acute respiratory failure with hypoxia and hypercapnia (CMS/HCC) [J96.01,J96.02]                 Active Insurance as of 5/19/2020     Primary Coverage     Payor Plan Insurance Group Employer/Plan Group    ANTHEM MEDICAID ANTHEM MEDICAID [id number]     Payor Plan Address Payor Plan Phone Number Payor Plan Fax Number Effective Dates    [address] [address] [phone]  12/1/2019 - None Entered    [facility] [id number]       Subscriber Name Subscriber Birth Date Member ID       FARNAZ EVANS 1965 [id number]                 Emergency Contacts      (Rel.) Home Phone Work Phone Mobile Phone    INDIO SINGH (Sister) [phone] -- [phone]          Sayda Timmons DO   Physician   Hospitalist   Discharge Summary   Signed   Date of Service:  05/20/20 1454   Creation Time:  05/20/20 1454       Related encounter: Admission (Current) from 5/20/2020 in [facility] with Safia Venegas MD         Signed             Show:Clear " all  [x]Manual[x]Template[x]Copied    Added by:  [x]Sayda Timmons DO    []Haven for details       [facility]  DISCHARGE TO INPATIENT HOSPICE     Patient Name: Alphonso Evans  : 1965  MRN: [id number]     Date of Admission: 2020  Date of Discharge:  2020  Primary Care Physician: Juan Mccarty MD             Consults      Date and Time Order Name Status Description     2020 1240 Inpatient Palliative Care MD Consult Completed            Hospital Course      Presenting Problem:   Renal cancer (CMS/HCC) [C64.9]  Renal cancer (CMS/HCC) [C64.9]  Renal cancer (CMS/HCC) [C64.9]           Active Hospital Problems     Diagnosis   POA   • Renal cancer (CMS/HCC) [C64.9]   Yes       Resolved Hospital Problems   No resolved problems to display.            Hospital Course:  Alphonso Evans is a 54 y.o. male with a history of a metastatic renal cell carcinoma initially diagnosed in 2019.  Known metastatic disease to the bones, liver, lymphangitic spread in the lungs.  Disease progression despite treatment with Dr. Coffey.  Admitted in April and had a Pleurx catheter placed on the left side for recurrent malignant pleural effusion.  After that has been evaluated at [facility] and has been enrolled in a clinical trial.  Staging scans at that time incidentally found bilateral pulmonary emboli and was started on Xarelto.  Over the last week breathing has become a little worse.  His sister has been draining his Pleurx catheter more often to the point where she is draining a liter off a day.  Within the last 24 hours condition is gotten worse where he has become more confused, appears to have more pain, and unable to walk.  Decision was made to bring him in this morning.  ABG shows significant mixed hypoxic and hypercapnic respiratory failure and he has been placed on BiPAP and is may be slightly more arousable since that time.  Chest  x-ray reveals worsening right-sided pleural effusion.  No known fevers, no leukocytosis.  Tested negative for COVID in April.  He received doses of vancomycin and Zosyn in the emergency room.  Pt was admitted to the hospitalist service with Palliative Medicine consult. Pt was placed on Bi-pap. He did not tolerate well and has since been on a non-rebreather. Pt has received several doses of Dialudid and still remains agitated and restless. Pt has also been on high dose steroids due to concern for possible cytokine storm from current chemotherapy. He has been participating in a clinical trial at . Pt also with large R pleural effusion. Discussed with sister Felecia at bedside. Hopsice consulted and patient will be transitioned to inpatient hospice.      Day of Discharge      HPI:   Pt seen and examined. Nursing notes reviewed. Patient's sister at bedside. Pt wearing non-rebreather. He is actively dying. He seems agitated and groans at times.      ROS:  Unable to obtain     Vital Signs:   Temp:  [96.3 °F (35.7 °C)-97.8 °F (36.6 °C)] 97.5 °F (36.4 °C)  Heart Rate:  [108-134] 126  Resp:  [16-28] 20  BP: ()/(65-78) 117/78      Physical Exam:  Constitutional: Lying in bed, agitated  HENT: NCAT, mucous membranes dry  Respiratory: Coarse breath sounds throughout, tachypnea   Cardiovascular: Tachycardic, no murmurs, rubs, or gallops, palpable pedal pulses bilaterally  Gastrointestinal: Positive bowel sounds, soft, nontender, nondistended  Musculoskeletal: No bilateral ankle edema  Psychiatric: Unable to evalaute  Neurologic: Grossly non-focal  Skin: No rashes     Discharge Details      Discharge Disposition:  Transfer care to inpatient Hospice at [facility]     Time Spent on Discharge:  50 minutes, 35 minutes spent face to face with patient and sister discussing case/plan of care. Remaining time spent coordinating care.       Electronically signed by Sayda Timmons DO, 05/20/20, 2:55 PM.             Routing History

## 2020-05-20 NOTE — PLAN OF CARE
Problem: Patient Care Overview  Goal: Plan of Care Review  Flowsheets (Taken 5/20/2020 4527)  Plan of Care Reviewed With: patient  Outcome Summary: VSS on bi pap. Pt took bipap off multiple times. Gave dilaudid and ativan as ordered. Will continue to monitor.

## 2020-05-20 NOTE — PROCEDURES
Critical Care  Performed by: Leandra Adams MD  Authorized by: Leandra Adams MD   Total critical care time: 20 minutes  Critical care time was exclusive of separately billable procedures and treating other patients.  Critical care was necessary to treat or prevent imminent or life-threatening deterioration of the following conditions: respiratory failure.  Critical care was time spent personally by me on the following activities: development of treatment plan with patient or surrogate, examination of patient, evaluation of patient's response to treatment, obtaining history from patient or surrogate, ordering and performing treatments and interventions, pulse oximetry and review of old charts.      Called for pt w/ increased agitation overnight.  Pulling bipap mask off and getting out of bed.  More tremulous and confused. Lungs w/ rhonchi throughout and diminished air mvmt.   Received prn dilaudid 0.5 but little relief so ativan ordered w/ minimal relief in sx.  Additional dose of dilaudid 0.5 mg given and pt able to rest comfortably remainder of night leaving bipap in place. Pt still confused at least prior to bipap.  May need to increase the dose of dilaudid given but this may obviously lead to oversedation as well.

## 2020-05-20 NOTE — PROGRESS NOTES
"Palliative Care Progress Note    Name: Alphonso Evans, Age: 54 y.o., Sex: male  Date of Admission: 5/19/2020 - LOS: 1 days    Subjective:    Pt did not tolerate bipap overnight, pulling it off and with increasing dyspnea. Tolerating facemask presently. Intermittent grunting and head shaking, does not open eyes. 6 doses of dilaudid since yesterday. Respirations are labored. Sister at bedside.     Current Code Status     Date Active Code Status Order ID Comments User Context       5/19/2020 1238 No CPR [id number]  Jose Vu MD ED       Questions for Current Code Status     Question Answer Comment    Code Status No CPR     Medical Interventions (Level of Support Prior to Arrest) Limited     Limited Support to NOT Include Cardioversion/Defibrillation      Intubation     Level Of Support Discussed With Next of Kin (If No Surrogate)      Health Care Surrogate           Objective:   /78 (BP Location: Left arm, Patient Position: Lying)   Pulse (!) 126   Temp 97.5 °F (36.4 °C) (Axillary)   Resp 18   Ht 170.2 cm (67\")   Wt 92 kg (202 lb 13.2 oz)   SpO2 96%   BMI 31.77 kg/m²      Intake/Output Summary (Last 24 hours) at 5/20/2020 1157  Last data filed at 5/20/2020 0900  Gross per 24 hour   Intake 500 ml   Output 1300 ml   Net -800 ml       Physical Exam:    Gen: well nourished adult male lying in bed in mild distress   HEENT: NCAT, eyes closed   CV: HR tachy   Pulm: use of accessory muscles, using facemask, crackles throughout, diminished isabel lower lobes R>L   GI/: abd, soft, NTND   MSK: good muscle tone   Neuro: lethargic, unresponsive, no tremor   Psych: non-verbal, calm    Results from last 7 days   Lab Units 05/19/20  0916   WBC 10*3/mm3 12.24*   HEMOGLOBIN g/dL 12.1*   HEMATOCRIT % 39.0   PLATELETS 10*3/mm3 234     Results from last 7 days   Lab Units 05/19/20  0915   SODIUM mmol/L 145   POTASSIUM mmol/L 5.5*   CHLORIDE mmol/L 102   CO2 mmol/L 34.0*   BUN mg/dL 28*   CREATININE mg/dL 1.17 "   CALCIUM mg/dL 9.3   BILIRUBIN mg/dL 0.2   ALK PHOS U/L 91   ALT (SGPT) U/L 20   AST (SGOT) U/L 18   GLUCOSE mg/dL 118*       Impression: 53yo with widespread metastatic renal carcinoma with acute decline.    Active Hospital Problems    Diagnosis POA   • **Acute respiratory failure with hypoxia and hypercapnia (CMS/HCC) [J96.01, J96.02] Yes   • Acute respiratory failure with hypercapnia (CMS/HCC) [J96.02] Yes   • Bilateral pulmonary embolism (CMS/HCC) [I26.99] Unknown   • CKD (chronic kidney disease) stage 3, GFR 30-59 ml/min (CMS/HCC) [N18.3] Yes   • Malignant pleural effusion [J91.0] Yes   • Bone metastases (CMS/HCC) [C79.51] Yes   • Renal cancer, left (CMS/HCC) [C64.2] Yes   • Hypertension [I10] Yes       Symptoms:   Dyspnea    Plan:   Dyspnea- dilaudid has been helpful, has received 6 doses since yesterday. Sister questioning benefit of thoracentesis of R side. Hospitalist ordering lasix today.    [facility]- spoke with sister/HCS Felecia at bedside. She understands that this is likely end-of-life for him but is torn because just 2 days ago he was well and said he wanted to pursue all treatment. She wants to see if he will wake up today so she can speak with him but also knows this may not happen. Discussed hospice options either in-pt or home. Hospice consult placed for further discussion.     Time: 45minutes    Mariana Encinas NP  05/20/20  11:57

## 2020-05-21 NOTE — NURSING NOTE
RN in room for [facility] med pass. Pt not breathing and HR stopped. Timed at 0215. Family at BS. [place] notified. Soon to talk with family member.

## 2020-05-21 NOTE — SIGNIFICANT NOTE
Exam confirms with auscultation zero audible heart tones and zero audible respirations. Mr.James Andi Evans was pronounced dead at 0215 on 05/21/2020.  MD notified by Patient's RN.    Valerie Waldrop RN  Clinical House Supervisor  5/21/2020 02:25

## 2020-05-21 NOTE — NURSING NOTE
Lab Called at this time to inform of + blood culture, Anaerobic bottle with Gram+ Cocci in groups. Comfort measures continued.

## 2020-05-23 LAB
BACTERIA SPEC AEROBE CULT: ABNORMAL
GRAM STN SPEC: ABNORMAL
ISOLATED FROM: ABNORMAL

## 2020-05-24 LAB — BACTERIA SPEC AEROBE CULT: NORMAL
